# Patient Record
Sex: FEMALE | Race: WHITE | Employment: FULL TIME | URBAN - METROPOLITAN AREA
[De-identification: names, ages, dates, MRNs, and addresses within clinical notes are randomized per-mention and may not be internally consistent; named-entity substitution may affect disease eponyms.]

---

## 2017-01-24 ENCOUNTER — HOSPITAL ENCOUNTER (OUTPATIENT)
Dept: MAMMOGRAPHY | Age: 62
Discharge: HOME | End: 2017-01-24
Attending: FAMILY MEDICINE
Payer: COMMERCIAL

## 2017-01-24 DIAGNOSIS — Z12.31 VISIT FOR SCREENING MAMMOGRAM: ICD-10-CM

## 2017-01-24 PROCEDURE — 77067 SCR MAMMO BI INCL CAD: CPT

## 2017-08-15 ENCOUNTER — OFFICE VISIT (OUTPATIENT)
Dept: ORTHOPEDIC SURGERY | Age: 62
End: 2017-08-15

## 2017-08-15 VITALS
BODY MASS INDEX: 26.25 KG/M2 | RESPIRATION RATE: 19 BRPM | HEIGHT: 59 IN | SYSTOLIC BLOOD PRESSURE: 128 MMHG | DIASTOLIC BLOOD PRESSURE: 58 MMHG | TEMPERATURE: 99 F | HEART RATE: 112 BPM | WEIGHT: 130.2 LBS

## 2017-08-15 DIAGNOSIS — M75.02 ADHESIVE CAPSULITIS OF LEFT SHOULDER: Primary | ICD-10-CM

## 2017-08-15 DIAGNOSIS — M89.8X2 PAIN OF LEFT HUMERUS: ICD-10-CM

## 2017-08-15 DIAGNOSIS — M54.2 NECK PAIN: ICD-10-CM

## 2017-08-15 RX ORDER — MELOXICAM 15 MG/1
15 TABLET ORAL
Qty: 30 TAB | Refills: 1 | Status: SHIPPED | OUTPATIENT
Start: 2017-08-15 | End: 2020-09-01 | Stop reason: SDUPTHER

## 2017-08-15 RX ORDER — FLUOXETINE HYDROCHLORIDE 20 MG/1
CAPSULE ORAL
Refills: 3 | COMMUNITY
Start: 2017-07-22

## 2017-08-15 NOTE — PATIENT INSTRUCTIONS
Back Pain: Care Instructions  Your Care Instructions    Back pain has many possible causes. It is often related to problems with muscles and ligaments of the back. It may also be related to problems with the nerves, discs, or bones of the back. Moving, lifting, standing, sitting, or sleeping in an awkward way can strain the back. Sometimes you don't notice the injury until later. Arthritis is another common cause of back pain. Although it may hurt a lot, back pain usually improves on its own within several weeks. Most people recover in 12 weeks or less. Using good home treatment and being careful not to stress your back can help you feel better sooner. Follow-up care is a key part of your treatment and safety. Be sure to make and go to all appointments, and call your doctor if you are having problems. Its also a good idea to know your test results and keep a list of the medicines you take. How can you care for yourself at home? · Sit or lie in positions that are most comfortable and reduce your pain. Try one of these positions when you lie down:  ¨ Lie on your back with your knees bent and supported by large pillows. ¨ Lie on the floor with your legs on the seat of a sofa or chair. Jovanna Sevier on your side with your knees and hips bent and a pillow between your legs. ¨ Lie on your stomach if it does not make pain worse. · Do not sit up in bed, and avoid soft couches and twisted positions. Bed rest can help relieve pain at first, but it delays healing. Avoid bed rest after the first day of back pain. · Change positions every 30 minutes. If you must sit for long periods of time, take breaks from sitting. Get up and walk around, or lie in a comfortable position. · Try using a heating pad on a low or medium setting for 15 to 20 minutes every 2 or 3 hours. Try a warm shower in place of one session with the heating pad. · You can also try an ice pack for 10 to 15 minutes every 2 to 3 hours.  Put a thin cloth between the ice pack and your skin. · Take pain medicines exactly as directed. ¨ If the doctor gave you a prescription medicine for pain, take it as prescribed. ¨ If you are not taking a prescription pain medicine, ask your doctor if you can take an over-the-counter medicine. · Take short walks several times a day. You can start with 5 to 10 minutes, 3 or 4 times a day, and work up to longer walks. Walk on level surfaces and avoid hills and stairs until your back is better. · Return to work and other activities as soon as you can. Continued rest without activity is usually not good for your back. · To prevent future back pain, do exercises to stretch and strengthen your back and stomach. Learn how to use good posture, safe lifting techniques, and proper body mechanics. When should you call for help? Call your doctor now or seek immediate medical care if:  · You have new or worsening numbness in your legs. · You have new or worsening weakness in your legs. (This could make it hard to stand up.)  · You lose control of your bladder or bowels. Watch closely for changes in your health, and be sure to contact your doctor if:  · Your pain gets worse. · You are not getting better after 2 weeks. Where can you learn more? Go to http://rocio-chris.info/. Enter Z386 in the search box to learn more about \"Back Pain: Care Instructions. \"  Current as of: March 21, 2017  Content Version: 11.3  © 4747-2929 [employer]. Care instructions adapted under license by [employer] (which disclaims liability or warranty for this information). If you have questions about a medical condition or this instruction, always ask your healthcare professional. [address] any warranty or liability for your use of this information.

## 2017-08-15 NOTE — PROGRESS NOTES
Cally Cuenca  1955   Chief Complaint   Patient presents with    Shoulder Pain     Left        HISTORY OF PRESENT ILLNESS  Cally Cuenca is a 63 y.o. female who presents today for evaluation of left shoulder pain. she rates her pain 0/10 today. Pain has been present for several months. She recalls she started using a 3 lb weight to do exercises at home. Patient describes the pain as aching and sharp that is Intermittent in nature. Symptoms are worse with certain motions behind the back and is better with  Rest. Associated symptoms include stiffness. She denies pain in the shoulder blade. Since problem started, it: has worsened. Pain does wake patient up at night. Has not taken medication for the problem. Has tried following treatments: Injections:NO; Brace:NO; Therapy:NO; Cane/Crutch:NO       No Known Allergies     Past Medical History:   Diagnosis Date    ADD (attention deficit disorder) without hyperactivity     Arthritis     knees    Depression     Migraine     UTI (urinary tract infection)       Social History     Social History    Marital status:      Spouse name: N/A    Number of children: N/A    Years of education: N/A     Occupational History    Not on file.      Social History Main Topics    Smoking status: Never Smoker    Smokeless tobacco: Never Used    Alcohol use Yes      Comment: socially    Drug use: No    Sexual activity: Not on file     Other Topics Concern    Not on file     Social History Narrative      Past Surgical History:   Procedure Laterality Date    HX  SECTION  91 & 96    HX PELVIC LAPAROSCOPY      (+)endometriosis    HX TUBAL LIGATION      RI COLONOSCOPY FLX DX W/COLLJ SPEC WHEN PFRMD  2009    normal ; dr. Edgar Parra      Family History   Problem Relation Age of Onset    Hypertension Mother     Thyroid Disease Mother     Hypertension Father     Heart Attack Father     Other Father      circulation problems    Cancer Father  Colon Polyps Father       Current Outpatient Prescriptions   Medication Sig    FLUoxetine (PROZAC) 20 mg capsule TAKE 1 CAPSULE BY MOUTH ONCE DAILY    meloxicam (MOBIC) 15 mg tablet Take 1 Tab by mouth daily (with breakfast).  amphetamine-dextroamphetamine XR (ADDERALL XR) 30 mg XR capsule Take 30 mg by mouth every morning.  estradiol (VAGIFEM) 10 mcg Tab vaginal tablet Insert 10 mcg into vagina every Tuesday and Friday.  calcium carbonate-vitamin d2 500 mg(1,250mg) -200 unit Tab Take 1 Tab by mouth daily.  MULTIVITAMIN/IRON/FOLIC ACID (CENTRUM COMPLETE PO) Take  by mouth daily.  sertraline (ZOLOFT) 100 mg tablet Take  by mouth daily. No current facility-administered medications for this visit. REVIEW OF SYSTEM   Patient denies: Weight loss, Fever/Chills, HA, Visual changes, Fatigue, Chest pain, SOB, Abdominal pain, N/V/D/C, Blood in stool or urine, Edema. Pertinent positive as above in HPI. All others were negative    PHYSICAL EXAM:   Visit Vitals    /58    Pulse (!) 112    Temp 99 °F (37.2 °C) (Oral)    Resp 19    Ht 4' 11\" (1.499 m)    Wt 130 lb 3.2 oz (59.1 kg)    BMI 26.3 kg/m2     The patient is a well-developed, well-nourished female   in no acute distress. The patient is alert and oriented times three. The patient is alert and oriented times three. Mood and affect are normal.  LYMPHATIC: lymph nodes are not enlarged and are within normal limits  SKIN: normal in color and non tender to palpation. There are no bruises or abrasions noted. NEUROLOGICAL: Motor sensory exam is within normal limits. Reflexes are equal bilaterally.  There is normal sensation to pinprick and light touch  MUSCULOSKELETAL:  Examination Left shoulder   Skin Intact   AC joint tenderness -   Biceps tenderness -   Forward flexion/Elevation    Active abduction    Glenohumeral abduction 80   External rotation ROM 45   Internal rotation ROM 30   Apprehension -   Heathers Relocation -   Jerk -   Load and Shift -   Obriens -   Speeds -   Impingement sign -   Supraspinatus/Empty Can -, 5/5   External Rotation Strength -, 5/5   Lift Off/Belly Press -, 5/5   Neurovascular Intact        IMAGING: XR of the cervical spine dated 8/15/17 was reviewed and read: loss of cervical lordosis with spondylotic changes at 3-4 4-5 5-6 markedly decreased disc space    IMPRESSION:      ICD-10-CM ICD-9-CM    1. Adhesive capsulitis of left shoulder M75.02 726.0 meloxicam (MOBIC) 15 mg tablet      REFERRAL TO PHYSICAL THERAPY   2. Pain of left humerus M79.622 729.5 AMB POC XRAY; HUMERUS, 2+ VIEWS   3. Neck pain M54.2 723.1 AMB POC XRAY, SPINE, CERVICAL; 2 OR 3        PLAN:  1. I feel the patient has an adhesive capsulitis. Discussed with her that we will need to work on mobility of the shoulder. Risk factors include: none  2. No cortisone injection indicated today   3. Yes Physical/Occupational Therapy indicated today  4. No diagnostic test indicated today  5. No durable medical equipment indicated today  6. No referral indicated today   7. Yes medications indicated today: MOBIC 15 mg  8. No Narcotic indicated today. RTC 4 weeks  Follow-up Disposition: Not on File    Scribed by [place] [address]) as dictated by Wang Mckinney MD    I, Dr. Wang Mckinney, confirm that all documentation is accurate.     Wang Mckinney M.D.   [facility]

## 2017-08-24 ENCOUNTER — HOSPITAL ENCOUNTER (OUTPATIENT)
Dept: PHYSICAL THERAPY | Age: 62
Discharge: HOME | End: 2017-08-24
Payer: COMMERCIAL

## 2017-08-24 PROCEDURE — 97162 PT EVAL MOD COMPLEX 30 MIN: CPT

## 2017-08-24 PROCEDURE — 97110 THERAPEUTIC EXERCISES: CPT

## 2017-08-24 NOTE — PROGRESS NOTES
PT DAILY TREATMENT NOTE/SHOULDER EVAL 3-16    Patient Name: Marie Lozoya  Date:2017  : 1955  [x]  Patient  Verified  Payor: Liang Alexander / Plan: [place] [employer] / Product Type: HMO /    In time:8:43  Out time:9:30  Total Treatment Time (min): 52  Visit #: 1 of 12    Treatment Area: Left shoulder pain [M25.512]    SUBJECTIVE  Pain Level (0-10 scale): 5/10  []constant []intermittent [x]improving []worsening []no change since onset    Any medication changes, allergies to medications, adverse drug reactions, diagnosis change, or new procedure performed?: [x] No    [] Yes (see summary sheet for update)  Subjective functional status/changes:      Pt is a 62 yo F who presents with L shoulder pain 3-4 months ago after lifting 3# weights at home. Pain is the worst with L sidelying. Pt had a fall while camping and landed on her L shoulder right before she saw Dr. Anahi Henry. She was trying to go the bathroom after a happy hour and she lost her balance while undoing her pants. She does not worry about her balance typically. Pain has been getting better. She believes the Meloxicam may be helping.      Barriers: []pain []financial []time []transportation [x]other: none  Substance use: [x]Alcohol []Tobacco []other: socially   FABQ Score: []low [x]elevate - based on FOTO   Cognition: A & O x 4    Other:    OBJECTIVE/EXAMINATION      34 min [x]Eval                  []Re-Eval       8 min Therapeutic Exercise:  [x] See flow sheet :   Rationale: increase ROM and increase strength to improve the patients ability to improve ease of reaching for improved ease of household management     5 min Therapeutic Activity:  []  See flow sheet :   Rationale: Educated patient regarding findings of evaluation, anatomy of shoulder, stages of adhesive capsulitis, heat use 10-15 minute, new therex technique and purpose, purpose of therapy, and therapy plan in order to ensure pt understanding/compliance with therapy        With   [] TE   [] TA   [] neuro   [] other: Patient Education: [x] Review HEP    [] Progressed/Changed HEP based on:   [] positioning   [] body mechanics   [] transfers   [] heat/ice application    [] other:      Other Objective/Functional Measures:     /92 taken manually prior to therapy     Physical Therapy Evaluation - Shoulder    Posture: [] Poor    [x] Fair    [] Good    Describe: forward head and rounded shoulders     ROM:  [] Unable to assess at this time                                           AROM                                                              PROM   Left Right  Left Right   Flexion 154 180 Flexion 156 p! 182   Extension   Extension     Scaption/ 187 Scaptin/ p!     ER @ 0 Degrees   ER @ 0 Degrees     ER @ 90 Degrees 48 82 ER @ 90 Degrees 60 p! 91   IR @ 90 Degrees 64 74 IR @ 90 Degrees 72 78     Shoulder AROM Seated:  Shoulder Elevation: R 181 dgrs, L 151 dgrs   Functional ER: R T5, L T1  Functional IR: R T8, L T11    End Feel / Painful Arc:    Strength:   [] Unable to assess at this time                                                                            L (1-5) R (1-5) Pain   Flexors 3+ 4- [] Yes   [] No   Abductors 3+ 4- [] Yes   [] No   External Rotators 4 4 [] Yes   [] No   Internal Rotators 4- 4 [] Yes   [] No   Supraspinatus   [] Yes   [] No   Serratus Anterior   [] Yes   [] No   Lower Trapezius   [] Yes   [] No   Elbow Flexion 4 4 [] Yes   [] No   Elbow Extension 4 4 [] Yes   [] No       Scapulohumoral Control / Rhythm:  Decreased upward rotation of L scapula with shoulder elevation     Palpation  No TTP all RTC tendons, deltoid tuberosity, ACJ, SCJ     Optional Tests:      Spurling's  [] Pos   [x] Neg Yergason's Test [] Pos   [] Neg  Cervical Compression[] Pos   [x] Neg Nii's Sign [] Pos   [] Neg  Cervical Distraction [] Pos   [x] Neg Clunk Test  [] Pos   [] Neg  Hawkin's Test  [] Pos   [] Neg AC Joint  [] Pos   [] Neg  Speed's Test  [] Pos   [x] Neg SC Joint  [] Pos [] Neg  Empty Can  [] Pos   [x] Neg Pectoral Tightness [] Pos   [] Neg  Anterior Apprehension [] Pos   [] Neg   Posterior Apprehension [] Pos   [] Neg      Other Tests / Comments:     Pain with end-range for all motions, especially abduction and ER  Instructed to perform stretches with HEP in pain-free range  No increased pain following therapy      Pain Level (0-10 scale) post treatment: 2-3/10    ASSESSMENT/Changes in Function: See POC    Patient will continue to benefit from skilled PT services to modify and progress therapeutic interventions, address ROM deficits, address strength deficits, analyze and address soft tissue restrictions, analyze and cue movement patterns, assess and modify postural abnormalities and instruct in home and community integration to attain remaining goals.      [x]  See Plan of Care  []  See progress note/recertification  []  See Discharge Summary         Progress towards goals / Updated goals:  See POC    PLAN  [x]  Upgrade activities as tolerated     []  Continue plan of care  [x]  Update interventions per flow sheet       []  Discharge due to:_  []  Other:_      Sheri Hidalgo, PT 8/24/2017  8:40 AM

## 2017-08-24 NOTE — PROGRESS NOTES
[facility] [employer] [facility]  [address] [place] [address]  [phone] fax    Plan of Care/ Statement of Necessity for Physical Therapy Services    Patient name: Eriberto Boyd Start of Care: 2017   Referral source: [facility],* : 1955    Medical Diagnosis: Left shoulder pain [M25.512]   Onset Date:3-4 months ago   Treatment Diagnosis: L Shoulder Pain   Prior Hospitalization: see medical history Provider#: [id number]   Medications: Verified on Patient summary List    Comorbidities: arthritis, ADD, depression    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 Assessment/ key information: Pt is a 64 yo F who presents with L shoulder pain beginning 3-4 months ago after using 3# weights for an UE lifting routine. Subjective reports of pain increased with L sidelying and reaching behind her back and relieved with resting supine. Xrays were negative per pt report. Pt is not TTP over shoulder musculature or surrounding joints/bony prominences. Pt demonstrates decreased AROM with deficits evident in a capsular pattern. PROM is limited with empty end-feel. Pt demonstrates decreased strength of R shoulder. (-) Speed's, (-) Empty Can, (-) Spurling's, (-) Cervical Compression, and (-) Cervical Distraction. Findings consistent with referring dx of adhesive capsuliitis. Pt will benefit from skilled PT to address ROM, strength, flexibility, and joint mobility deficits for return to PLOF.         Evaluation Complexity History HIGH Complexity :3+ comorbidities / personal factors will impact the outcome/ POC ; Examination HIGH Complexity : 4+ Standardized tests and measures addressing body structure, function, activity limitation and / or participation in recreation  ;Presentation MEDIUM Complexity : Evolving with changing characteristics  ; Clinical Decision Making MEDIUM Complexity : FOTO score of 26-74  Overall Complexity Rating: MEDIUM  Problem List: pain affecting function, decrease ROM, decrease strength, decrease activity tolerance and decrease flexibility/ joint mobility   Treatment Plan may include any combination of the following: Therapeutic exercise, Therapeutic activities, Neuromuscular re-education, Physical agent/modality, Gait/balance training, Manual therapy, Patient education, Self Care training, Functional mobility training, Home safety training and Stair training  Patient / Family readiness to learn indicated by: asking questions, trying to perform skills and interest  Persons(s) to be included in education: patient (P) and family support person (FSP);list spouse  Barriers to Learning/Limitations: None  Patient Goal (s): Able to sleep on L side without pain  Patient Self Reported Health Status: good  Rehabilitation Potential: good    Short Term Goals: To be accomplished in 1 weeks:  1. Pt will be compliant with initial HEP to improve therapy outcomes   Long Term Goals: To be accomplished in 6 weeks:  1. Pt will improve FOTO by 7 points in order to demonstrate functional improvement   2. Pt will increase L shoulder AROM by 10 dgrs all directions in order to demonstrate improving shoulder mobility for increased ease of reaching with ADLs and household management  3. Pt will report <3/10 average pain in order to improve QOL  4. Pt will sleep for 5 hours without waking d/t pain in order to improve QOL     Frequency / Duration: Patient to be seen 2 times per week for 6 weeks.     Patient/ CarPatient/ Caregiver education and instruction: Diagnosis, prognosis, exercises   [x]  Plan of care has been reviewed with HAYLEE Dior PT 8/24/2017 8:41 AM    ________________________________________________________________________    I certify that the above Therapy Services are being furnished while the patient is under my care. I agree with the treatment plan and certify that this therapy is necessary.     Physician's Signature:____________________  Date:____________Time: _________    Please sign and return to [facility]  [address] [place] [address]  [phone] fax

## 2017-08-28 ENCOUNTER — HOSPITAL ENCOUNTER (OUTPATIENT)
Dept: PHYSICAL THERAPY | Age: 62
Discharge: HOME | End: 2017-08-28
Payer: COMMERCIAL

## 2017-08-28 PROCEDURE — 97140 MANUAL THERAPY 1/> REGIONS: CPT

## 2017-08-28 PROCEDURE — 97110 THERAPEUTIC EXERCISES: CPT

## 2017-08-28 NOTE — PROGRESS NOTES
PT DAILY TREATMENT NOTE     Patient Name: Nathaniel Chatman  Date:2017  : 1955  [x]  Patient  Verified  Payor: Joshua Going / Plan: [employer] / Product Type: HMO /    In time: 8:35  Out time:9:32  Total Treatment Time (min): 62  Visit #: 2 of 12    Treatment Area: Left shoulder pain [M25.512]    SUBJECTIVE  Pain Level (0-10 scale): 2/10  Any medication changes, allergies to medications, adverse drug reactions, diagnosis change, or new procedure performed?: [x] No    [] Yes (see summary sheet for update)  Subjective functional status/changes:   [] No changes reported  Pt reported feeling good today    OBJECTIVE    Modality rationale: decrease pain, increase tissue extensibility and increase muscle contraction/control to improve the patients ability to tolerate ADLs   Min Type Additional Details    [] Estim:  []Unatt       []IFC  []Premod                        []Other:  []w/ice   []w/heat  Position:  Location:    [] Estim: []Att    []TENS instruct  []NMES                    []Other:  []w/US   []w/ice   []w/heat  Position:  Location:    []  Traction: [] Cervical       []Lumbar                       [] Prone          []Supine                       []Intermittent   []Continuous Lbs:  [] before manual  [] after manual    []  Ultrasound: []Continuous   [] Pulsed                           []1MHz   []3MHz W/cm2:  Location:    []  Iontophoresis with dexamethasone         Location: [] Take home patch   [] In clinic   10 []  Ice     [x]  heat  []  Ice massage  []  Laser   []  Anodyne Position: seated   Location: L shoulder    []  Laser with stim  []  Other:  Position:  Location:    []  Vasopneumatic Device Pressure:       [] lo [] med [] hi   Temperature: [] lo [] med [] hi   [x] Skin assessment post-treatment:  [x]intact []redness- no adverse reaction    []redness - adverse reaction:     38 min Therapeutic Exercise:  [x] See flow sheet :   Rationale: increase ROM and increase strength to improve the patients ability to perform ADLs with ease. 9 min Manual Therapy:  L shoulder inf, post grade 2-3 mob; L scapula upward mob with L shoulder elevation; gentle traction; PROM; ER stretching   Rationale: decrease pain and increase ROM to improve pt's L shoulder ROM for ADLs            With   [] TE   [] TA   [] neuro   [] other: Patient Education: [x] Review HEP    [] Progressed/Changed HEP based on:   [] positioning   [] body mechanics   [] transfers   [] heat/ice application    [] other:      Other Objective/Functional Measures:    BP: 122/79 mmHg; HR: 82 bpm   Min increased pain with Jamila   More limited with abd than flex     Performed Jamila appropriately after instructions   Mod challenged with pendulum    Tolerated manual fairly with min c/o pain at end range of ER & abd    Pain Level (0-10 scale) post treatment: 0/10    ASSESSMENT/Changes in Function: initiated treatment as POC, pt demonstrated good effort with all Jamila. Patient will continue to benefit from skilled PT services to modify and progress therapeutic interventions, address functional mobility deficits, address ROM deficits, address strength deficits, analyze and address soft tissue restrictions, analyze and cue movement patterns, analyze and modify body mechanics/ergonomics, assess and modify postural abnormalities and instruct in home and community integration to attain remaining goals. [x]  See Plan of Care  []  See progress note/recertification  []  See Discharge Summary         Progress towards goals / Updated goals:  Short Term Goals: To be accomplished in 1 weeks:   1. Pt will be compliant with initial HEP to improve therapy outcomes partially due to family issue 8-  Long Term Goals: To be accomplished in 6 weeks:   1. Pt will improve FOTO by 7 points in order to demonstrate functional improvement    2.  Pt will increase L shoulder AROM by 10 dgrs all directions in order to demonstrate improving shoulder mobility for increased ease of reaching with ADLs and household management   3. Pt will report <3/10 average pain in order to improve QOL   4.  Pt will sleep for 5 hours without waking d/t pain in order to improve QOL     PLAN  []  Upgrade activities as tolerated     [x]  Continue plan of care  []  Update interventions per flow sheet       []  Discharge due to:_  []  Other:_      Janki Gamallucas, PT 8/28/2017  8:42 AM    Future Appointments  Date Time Provider [facility]   9/1/2017 4:30 PM Jaynie Canavan, PTA [facility]   9/11/2017 4:00 PM Jade Cohen MD [facility] [place] [address]

## 2017-09-01 ENCOUNTER — HOSPITAL ENCOUNTER (OUTPATIENT)
Dept: PHYSICAL THERAPY | Age: 62
Discharge: HOME | End: 2017-09-01
Payer: COMMERCIAL

## 2017-09-01 PROCEDURE — 97110 THERAPEUTIC EXERCISES: CPT

## 2017-09-01 NOTE — PROGRESS NOTES
PT DAILY TREATMENT NOTE 12    Patient Name: Inocencia Edwards  Date:2017  : 1955  [x]  Patient  Verified  Payor: Aguilar Mulling / Plan: [place] [employer]  CAPITATED PT / Product Type: Commerical /    In time: 4:35  Out time: 5:16  Total Treatment Time (min): 41  Visit #: 3 of 12    Treatment Area: Left shoulder pain [M25.512]    SUBJECTIVE  Pain Level (0-10 scale): 0-3/10 depends on movements  Any medication changes, allergies to medications, adverse drug reactions, diagnosis change, or new procedure performed?: [x] No    [] Yes (see summary sheet for update)  Subjective functional status/changes:   [] No changes reported  Pt having pain depends on activity    OBJECTIVE  Modality rationale: decrease pain, increase tissue extensibility and increase muscle contraction/control to improve the patients ability to tolerate ADLs   Min Type Additional Details     [] Estim:  []Unatt       []IFC  []Premod                        []Other:  []w/ice   []w/heat  Position:  Location:     [] Estim: []Att    []TENS instruct  []NMES                    []Other:  []w/US   []w/ice   []w/heat  Position:  Location:     []  Traction: [] Cervical       []Lumbar                       [] Prone          []Supine                       []Intermittent   []Continuous Lbs:  [] before manual  [] after manual     []  Ultrasound: []Continuous   [] Pulsed                           []1MHz   []3MHz W/cm2:  Location:     []  Iontophoresis with dexamethasone         Location: [] Take home patch   [] In clinic   10 []  Ice     [x]  heat  []  Ice massage  []  Laser   []  Anodyne Position: seated   Location: L shoulder     []  Laser with stim  []  Other:  Position:  Location:     []  Vasopneumatic Device Pressure:       [] lo [] med [] hi   Temperature: [] lo [] med [] hi   [x] Skin assessment post-treatment:  [x]intact []redness- no adverse reaction    []redness - adverse reaction:      31 min Therapeutic Exercise:  [x] See flow sheet :   Rationale: increase ROM and increase strength to improve the patients ability to perform ADLs with ease. With   [] TE   [] TA   [] neuro   [] other: Patient Education: [x] Review HEP    [] Progressed/Changed HEP based on:   [] positioning   [] body mechanics   [] transfers   [] heat/ice application    [] other:      Other Objective/Functional Measures: Mod pain with all Jamila; pain locates ant of L GH joint    Mod challenged with TB row with min shoulder hiking   L shoulder flex AROM in standin degrees      Pain Level (0-10 scale) post treatment: 0/10    ASSESSMENT/Changes in Function: Pt progress steady towards goals. Will progress with parascapular musculature strengthening Jamila.     Patient will continue to benefit from skilled PT services to modify and progress therapeutic interventions, address functional mobility deficits, address ROM deficits, address strength deficits, analyze and address soft tissue restrictions, analyze and cue movement patterns, analyze and modify body mechanics/ergonomics, assess and modify postural abnormalities and instruct in home and community integration to attain remaining goals.      []  See Plan of Care  []  See progress note/recertification  [x]  See Discharge Summary      Progress towards goals / Updated goals:  Short Term Goals: To be accomplished in 1 weeks:                        1. Pt will be compliant with initial HEP to improve therapy outcomes partially due to family issue 2017  Long Term Goals: To be accomplished in 6 weeks:                        1. Pt will improve FOTO by 7 points in order to demonstrate functional improvement                         2. Pt will increase L shoulder AROM by 10 dgrs all directions in order to demonstrate improving shoulder mobility for increased ease of reaching with ADLs and household management                        3. Pt will report <3/10 average pain in order to improve QOL                        4. Pt will sleep for 5 hours without waking d/t pain in order to improve QOL      PLAN  []  Upgrade activities as tolerated     [x]  Continue plan of care  []  Update interventions per flow sheet       []  Discharge due to:_  []  Other:_      Yue Marie, PT 9/1/2017  11:21 AM    Future Appointments  Date Time Provider Leanne Martin   9/1/2017 4:30 PM Thienphuc Sigmund Pedlar [facility]   9/5/2017 5:00 PM Thienphuc Sigmund Pedlar [facility]   9/8/2017 5:00 PM August Pollard PTA [facility]   9/11/2017 4:00 PM Minesh Jamison MD [place] [id number]   9/12/2017 8:30 AM Thienphjean-pierre Monaeund Pedlar VIKYCIO [facility]   9/14/2017 5:00 PM August Pollard PTA [facility]

## 2017-09-05 ENCOUNTER — HOSPITAL ENCOUNTER (OUTPATIENT)
Dept: PHYSICAL THERAPY | Age: 62
Discharge: HOME | End: 2017-09-05
Payer: COMMERCIAL

## 2017-09-05 PROCEDURE — 97110 THERAPEUTIC EXERCISES: CPT

## 2017-09-05 NOTE — PROGRESS NOTES
PT DAILY TREATMENT NOTE     Patient Name: Ron Gauthier  Date:2017  : 1955  [x]  Patient  Verified  Payor: Sydni Fuchs / [place] [place] [employer]  [facility] / Product Type: Commerical /    In time: 5:14  Out time: 5:44  Total Treatment Time (min): 30  Visit #: 4 of 12    Treatment Area: Left shoulder pain [M25.512]    SUBJECTIVE  Pain Level (0-10 scale): 0/10  Any medication changes, allergies to medications, adverse drug reactions, diagnosis change, or new procedure performed?: [x] No    [] Yes (see summary sheet for update)  Subjective functional status/changes:   [] No changes reported  Pt reported no pain but she's not sure if moving with increase it    OBJECTIVE        Modality rationale: decrease pain, increase tissue extensibility and increase muscle contraction/control to improve the patients ability to tolerate ADLs   Min Type Additional Details      [] Estim:  []Unatt       []IFC  []Premod                        []Other:  []w/ice   []w/heat  Position:  Location:      [] Estim: []Att    []TENS instruct  []NMES                    []Other:  []w/US   []w/ice   []w/heat  Position:  Location:      []  Traction: [] Cervical       []Lumbar                       [] Prone          []Supine                       []Intermittent   []Continuous Lbs:  [] before manual  [] after manual      []  Ultrasound: []Continuous   [] Pulsed                           []1MHz   []3MHz W/cm2:  Location:      []  Iontophoresis with dexamethasone         Location: [] Take home patch   [] In clinic   10 []  Ice     [x]  heat  []  Ice massage  []  Laser   []  Anodyne Position: seated   Location: L shoulder      []  Laser with stim  []  Other:  Position:  Location:      []  Vasopneumatic Device Pressure:       [] lo [] med [] hi   Temperature: [] lo [] med [] hi   [x] Skin assessment post-treatment:  [x]intact []redness- no adverse reaction    []redness - adverse reaction:       20 min Therapeutic Exercise:  [x] See flow sheet : Rationale: increase ROM and increase strength to improve the patients ability to perform ADLs with ease.             With   [] TE   [] TA   [] neuro   [] other: Patient Education: [x] Review HEP    [] Progressed/Changed HEP based on:   [] positioning   [] body mechanics   [] transfers   [] heat/ice application    [] other:      Other Objective/Functional Measures:     Pt was 14 min late    Performed Jamila appropriately with min verbal cues   Added LT/MT TE, will progress with more scap stabilization Jamila     Pain Level (0-10 scale) post treatment: 0/10    ASSESSMENT/Changes in Function: pt progressing steady towards goals; demonstrated mod pain relief and she was able to sleep for 5 hours without being wakened up by pain. Will increase rep and resistance with all Jamila next visit. Patient will continue to benefit from skilled PT services to modify and progress therapeutic interventions, address functional mobility deficits, address ROM deficits, address strength deficits, analyze and address soft tissue restrictions, analyze and cue movement patterns, analyze and modify body mechanics/ergonomics, assess and modify postural abnormalities and instruct in home and community integration to attain remaining goals.      []  See Plan of Care  []  See progress note/recertification  [x]  See Discharge Summary      Progress towards goals / Updated goals:  Short Term Goals: To be accomplished in 1 weeks:                        5. Pt will be compliant with initial HEP to improve therapy outcomes partially due to family issue 8-  Long Term Goals: To be accomplished in 6 weeks:                        8.  Pt will improve FOTO by 7 points in order to demonstrate functional improvement                         2. Pt will increase L shoulder AROM by 10 dgrs all directions in order to demonstrate improving shoulder mobility for increased ease of reaching with ADLs and household management progressing, flex of L shoulder is about 10 degrees different from R shoulder 9-5-17                        3. Pt will report <3/10 average pain in order to improve QOL. Progressing, 0-3/10 for the last 2 consecutive visits 9-5-17                        4. Pt will sleep for 5 hours without waking d/t pain in order to improve QOL.  Met per pt report 9-5-17      PLAN  []  Upgrade activities as tolerated     [x]  Continue plan of care  []  Update interventions per flow sheet       []  Discharge due to:_  []  Other:_      Uyen Gardner, PT 9/5/2017  8:05 AM    Future Appointments  Date Time Provider Leanne Martin   9/5/2017 5:00 PM Seth Davalos [facility]   9/8/2017 5:00 PM Alea Otto PTA [facility]   9/11/2017 4:00 PM MD [place] [address]   9/12/2017 8:30 AM Thienphuc Jock Lied OJAUSAJAKUB [facility]   9/14/2017 5:00 PM Alea Otto PTA [facility]

## 2017-09-08 ENCOUNTER — HOSPITAL ENCOUNTER (OUTPATIENT)
Dept: PHYSICAL THERAPY | Age: 62
Discharge: HOME | End: 2017-09-08
Payer: COMMERCIAL

## 2017-09-08 PROCEDURE — 97110 THERAPEUTIC EXERCISES: CPT

## 2017-09-08 PROCEDURE — 97140 MANUAL THERAPY 1/> REGIONS: CPT

## 2017-09-08 NOTE — PROGRESS NOTES
PT DAILY TREATMENT NOTE     Patient Name: Eriberto Boyd  Date:2017  : 1955  [x]  Patient  Verified  Payor: Alpa Parsons / Plan: [place] [employer]  CAPITATED PT / Product Type: Commerical /    In time: 5:15  Out time: 6:00  Total Treatment Time (min): 45  Visit #: 5 of 12    Treatment Area: Left shoulder pain [M25.512]    SUBJECTIVE  Pain Level (0-10 scale): 3/10  Any medication changes, allergies to medications, adverse drug reactions, diagnosis change, or new procedure performed?: [x] No    [] Yes (see summary sheet for update)  Subjective functional status/changes:   [] No changes reported  Pt reports mainly discomfort when reaching behind her back to put on jackets and clothes. She feels she has been getting better with stretching. She cancelled her MD appt because she hadn't done that much therapy yet and scheduled it a little further out.      OBJECTIVE    Modality rationale: decrease pain and increase tissue extensibility to improve the patients ability to improve ease of returning to gym routine   Min Type Additional Details    [] Estim:  []Unatt       []IFC  []Premod                        []Other:  []w/ice   []w/heat  Position:  Location:    [] Estim: []Att    []TENS instruct  []NMES                    []Other:  []w/US   []w/ice   []w/heat  Position:  Location:    []  Traction: [] Cervical       []Lumbar                       [] Prone          []Supine                       []Intermittent   []Continuous Lbs:  [] before manual  [] after manual    []  Ultrasound: []Continuous   [] Pulsed                           []1MHz   []3MHz W/cm2:  Location:    []  Iontophoresis with dexamethasone         Location: [] Take home patch   [] In clinic   10 []  Ice     [x]  heat  []  Ice massage  []  Laser   []  Anodyne Position:seated  Location: L shoulder    []  Laser with stim  []  Other:  Position:  Location:    []  Vasopneumatic Device Pressure:       [] lo [] med [] hi   Temperature: [] lo [] med [] hi   [x] Skin assessment post-treatment:  [x]intact []redness- no adverse reaction    []redness - adverse reaction:     25 min Therapeutic Exercise:  [x] See flow sheet :   Rationale: increase ROM and increase strength to improve the ability of OH reaching to return to performing ADLs    10 min Manual Therapy:  [facility] posterior/inferior glides, AC joint mobs   Rationale: decrease pain, increase ROM and increase tissue extensibility to improve ease of OH reaching         With   [] TE   [] TA   [] neuro   [] other: Patient Education: [x] Review HEP    [] Progressed/Changed HEP based on:   [] positioning   [] body mechanics   [] transfers   [] heat/ice application    [] other:      Other Objective/Functional Measures:   Improved behind the back reaching post manual with decreased pain  Slight pain with ER stretching  No TP in subscapularis or infraspinatus  Cues to prevent UT hiking with exercises    Pain Level (0-10 scale) post treatment: 1/10    ASSESSMENT/Changes in Function: Pt making steady progress towards goals with improving mobility. Pt has most difficulty with behind the back reaching for donning clothes or jackets but improves with manual. Will continue working on posture and [facility] mobility for decreased pain with ADLs. Patient will continue to benefit from skilled PT services to modify and progress therapeutic interventions, address functional mobility deficits, address ROM deficits, address strength deficits, analyze and address soft tissue restrictions, analyze and cue movement patterns, assess and modify postural abnormalities and instruct in home and community integration to attain remaining goals. Progress towards goals / Updated goals:  Short Term Goals: To be accomplished in 1 weeks:  1. Pt will be compliant with initial HEP to improve therapy outcomes partially due to family issue 8-  Long Term Goals: To be accomplished in 6 weeks:  1.  Pt will improve FOTO by 7 points in order to demonstrate functional improvement   2. Pt will increase L shoulder AROM by 10 dgrs all directions in order to demonstrate improving shoulder mobility for increased ease of reaching with ADLs and household management progressing, flex of L shoulder is about 10 degrees different from R shoulder 9-5-17  3. Pt will report <3/10 average pain in order to improve QOL. Progressing, 0-3/10 for the last 2 consecutive visits 9-5-17  4. Pt will sleep for 5 hours without waking d/t pain in order to improve QOL.  Met per pt report 9-5-17    PLAN  [x]  Upgrade activities as tolerated     [x]  Continue plan of care  []  Update interventions per flow sheet       []  Discharge due to:_  []  Other:_      Jaynie Canavan, PTA 9/8/2017  2:30 PM    Future Appointments  Date Time Provider Leanne Martin   9/8/2017 5:00 PM Jaynie Canavan, PTA [facility]   9/11/2017 4:00 PM Jade Cohen MD [place] [id number]   9/12/2017 8:30 AM Seth Fraser [facility]BTRAO [facility]   9/14/2017 5:00 PM Jaynie Canavan, PTA [facility]

## 2017-09-12 ENCOUNTER — HOSPITAL ENCOUNTER (OUTPATIENT)
Dept: PHYSICAL THERAPY | Age: 62
Discharge: HOME | End: 2017-09-12
Payer: COMMERCIAL

## 2017-09-12 PROCEDURE — 97110 THERAPEUTIC EXERCISES: CPT

## 2017-09-12 NOTE — PROGRESS NOTES
PT DAILY TREATMENT NOTE     Patient Name: Rudy Hodge  Date:2017  : 1955  [x]  Patient  Verified  Payor: Pilar Hartley / Plan: [address] [place] [address] Rd PT / Product Type: Commerical /    In time:835  Out time:914  Total Treatment Time (min): 39  Visit #: 6 of 12    Treatment Area: Left shoulder pain [M25.512]    SUBJECTIVE  Pain Level (0-10 scale): 3/10  Any medication changes, allergies to medications, adverse drug reactions, diagnosis change, or new procedure performed?: [x] No    [] Yes (see summary sheet for update)  Subjective functional status/changes:   [] No changes reported  Pt stated that she has a little pain in her shoulder today    OBJECTIVE    Modality rationale: decrease pain and increase tissue extensibility to improve the patients ability to increase ease with ADLs   Min Type Additional Details    [] Estim:  []Unatt       []IFC  []Premod                        []Other:  []w/ice   []w/heat  Position:  Location:    [] Estim: []Att    []TENS instruct  []NMES                    []Other:  []w/US   []w/ice   []w/heat  Position:  Location:    []  Traction: [] Cervical       []Lumbar                       [] Prone          []Supine                       []Intermittent   []Continuous Lbs:  [] before manual  [] after manual    []  Ultrasound: []Continuous   [] Pulsed                           []1MHz   []3MHz W/cm2:  Location:    []  Iontophoresis with dexamethasone         Location: [] Take home patch   [] In clinic   10 []  Ice     [x]  heat  []  Ice massage  []  Laser   []  Anodyne Position:seated  Location:L sh    []  Laser with stim  []  Other:  Position:  Location:    []  Vasopneumatic Device Pressure:       [] lo [] med [] hi   Temperature: [] lo [] med [] hi   [x] Skin assessment post-treatment:  [x]intact []redness- no adverse reaction    []redness - adverse reaction:     29 min Therapeutic Exercise:  [x] See flow sheet :   Rationale: increase ROM and increase strength to improve the patients ability to increase ease with ADLs    With   [x] TE   [] TA   [] neuro   [] other: Patient Education: [x] Review HEP    [] Progressed/Changed HEP based on:   [] positioning   [] body mechanics   [] transfers   [] heat/ice application    [] other:      Other Objective/Functional Measures:   Pt had no complaint of increased pain with exercises  Tolerated stretches well  Did report that she has more discomfort in the morning and will probably stick with evening appts     Pain Level (0-10 scale) post treatment: 0/10    ASSESSMENT/Changes in Function:   Pt is slowly progressing toward goals. Pt cont to have increased pain and difficulty with reaching activities. Pt reported that she went grocery shopping over the weekend and carrying the bags made her shoulder hurt some. Pt had fairly good range for flexion and scaption with the pulleys and wall ladder. Patient will continue to benefit from skilled PT services to modify and progress therapeutic interventions, address functional mobility deficits, address ROM deficits and address strength deficits to attain remaining goals. [x]  See Plan of Care  []  See progress note/recertification  []  See Discharge Summary         Progress towards goals / Updated goals:  Short Term Goals: To be accomplished in 1 weeks:  1. Pt will be compliant with initial HEP to improve therapy outcomes   partially due to family issue 8-  Long Term Goals: To be accomplished in 6 weeks:  1. Pt will improve FOTO by 7 points in order to demonstrate functional improvement   2. Pt will increase L shoulder AROM by 10 dgrs all directions in order to demonstrate improving shoulder mobility for increased ease of reaching with ADLs and household management   progressing, flex of L shoulder is about 10 degrees different from R shoulder 9-5-17  3. Pt will report <3/10 average pain in order to improve QOL. Progressing, 0-3/10 for the last 2 consecutive visits 9-5-17  4.  Pt will sleep for 5 hours without waking d/t pain in order to improve QOL.    Met per pt report 9-5-17    PLAN  []  Upgrade activities as tolerated     [x]  Continue plan of care  []  Update interventions per flow sheet       []  Discharge due to:_  []  Other:_      Ernestina Ruffin PTA 9/12/2017  8:35 AM    Future Appointments  Date Time Provider Leanne Veronica   9/14/2017 5:00 PM Gasper Farrell PTA [facility]   10/9/2017 4:30 PM Gm Castrejon MD [facility] [place] [address]

## 2017-09-14 ENCOUNTER — APPOINTMENT (OUTPATIENT)
Dept: PHYSICAL THERAPY | Age: 62
End: 2017-09-14
Payer: COMMERCIAL

## 2017-09-18 ENCOUNTER — HOSPITAL ENCOUNTER (OUTPATIENT)
Dept: PHYSICAL THERAPY | Age: 62
Discharge: HOME | End: 2017-09-18
Payer: COMMERCIAL

## 2017-09-18 PROCEDURE — 97140 MANUAL THERAPY 1/> REGIONS: CPT

## 2017-09-18 PROCEDURE — 97110 THERAPEUTIC EXERCISES: CPT

## 2017-09-18 NOTE — PROGRESS NOTES
PT DAILY TREATMENT NOTE     Patient Name: Anand Thompson  Date:2017  : 1955  [x]  Patient  Verified  Payor: Ifeanyi Bustamante / Plan: [place] [employer]  [facility] / Product Type: Commerical /    In time: 5:29  Out time: 6:14  Total Treatment Time (min): 45  Visit #: 7 of 12    Treatment Area: Left shoulder pain [M25.512]    SUBJECTIVE  Pain Level (0-10 scale): 0/10  Any medication changes, allergies to medications, adverse drug reactions, diagnosis change, or new procedure performed?: [x] No    [] Yes (see summary sheet for update)  Subjective functional status/changes:   [] No changes reported  Pt reported about more than 50% improvement after [place]    OBJECTIVE  Modality rationale: decrease pain and increase tissue extensibility to improve the patients ability to increase ease with ADLs   Min Type Additional Details     [] Estim:  []Unatt       []IFC  []Premod                        []Other:  []w/ice   []w/heat  Position:  Location:     [] Estim: []Att    []TENS instruct  []NMES                    []Other:  []w/US   []w/ice   []w/heat  Position:  Location:     []  Traction: [] Cervical       []Lumbar                       [] Prone          []Supine                       []Intermittent   []Continuous Lbs:  [] before manual  [] after manual     []  Ultrasound: []Continuous   [] Pulsed                           []1MHz   []3MHz W/cm2:  Location:     []  Iontophoresis with dexamethasone         Location: [] Take home patch   [] In clinic   10 []  Ice     [x]  heat  []  Ice massage  []  Laser   []  Anodyne Position:seated  Location:L sh     []  Laser with stim  []  Other:  Position:  Location:     []  Vasopneumatic Device Pressure:       [] lo [] med [] hi   Temperature: [] lo [] med [] hi   [x] Skin assessment post-treatment:  [x]intact []redness- no adverse reaction    []redness - adverse reaction:      26 min Therapeutic Exercise:  [x] See flow sheet :   Rationale: increase ROM and increase strength to improve the patients ability to increase ease with ADLs    9 min Manual Therapy:  L GH joint grade 3-4 post/inf mob, traction, ER stretch and L AC grade 3-4 mob   Rationale: decrease pain and increase ROM to improve L shoulder AROM for ADLs performance. With   [] TE   [] TA   [] neuro   [] other: Patient Education: [x] Review HEP    [] Progressed/Changed HEP based on:   [] positioning   [] body mechanics   [] transfers   [] heat/ice application    [] other:      Other Objective/Functional Measures:    Improved scap ROM with pulley and wall wipe   Min tightness and decreased ant mobility of L shoulder, mod pain with ER end range   Equal functional AROM comparing B shoulders with flex/abd, ER, and IR   Pt reported min-mod pain 2-3 times at base of her skull on R side during last month after she woke up    Suggested pt to contact MD to update on her pain med dosage since her pain improved well and pt expressed concern if she had to take anti-inflammatory med everyday     Pain Level (0-10 scale) post treatment: 0/10    ASSESSMENT/Changes in Function: pt progressing well with PT, demonstrated good improvement with functional mobility, pain relief and AROM of L shoulder. Pt's main limitations deemed to be decreased strength/endurance of parascapular musculature, decreased mobility L GH, decreased tolerance to prolonged activities with increased pain. Would cont to benefit from skilled PT to address these limitations and work towards unmet goals. Patient will continue to benefit from skilled PT services to modify and progress therapeutic interventions, address functional mobility deficits, address ROM deficits and address strength deficits to attain remaining goals.      []  See Plan of Care  [x]  See progress note/recertification  []  See Discharge Summary      Progress towards goals / Updated goals:  Short Term Goals: To be accomplished in 1 weeks:   1.  Pt will be compliant with initial HEP to improve therapy outcomes partially due to family issue 8-  Long Term Goals: To be accomplished in 6 weeks:   1. Pt will improve FOTO by 7 points in order to demonstrate functional improvement. Progressing well, improve 5 points 9-   2. Pt will increase L shoulder AROM by 10 dgrs all directions in order to demonstrate improving shoulder mobility for increased ease of reaching with ADLs and household management   progressing, flex of L shoulder is about 10 degrees different from R shoulder 9-5-17   3. Pt will report <3/10 average pain in order to improve QOL. Progressing, 0-3/10 for the last 2 consecutive visits 9-5-17    4. Pt will sleep for 5 hours without waking d/t pain in order to improve QOL.    Met per pt report 9-5-17     PLAN  []  Upgrade activities as tolerated     [x]  Continue plan of care  []  Update interventions per flow sheet       []  Discharge due to:_  []  Other:_      Lindsey Agent, PT 9/18/2017  7:55 AM    Future Appointments  Date Time Provider Leanne Martin   9/18/2017 5:30 PM Seth Payan [facility] [address]   9/20/2017 5:30 PM [facility] [address]   9/22/2017 5:00 PM Smita Proctor PTA [facility] [address]   10/9/2017 4:30 PM Eric Lowe MD [facility] 69

## 2017-09-18 NOTE — PROGRESS NOTES
[facility] [employer] [facility]  [address] [place] [address]  [phone] fax    Physical Therapy Progress Note  Patient name: Howard Garcia Start of Care: 2017   Referral source: Jennifer Montenegro,* : 1955                          Medical Diagnosis: Left shoulder pain [M25.512] Onset Date:3-4 months ago   Treatment Diagnosis: L Shoulder Pain   Prior Hospitalization: see medical history Provider#: [id number]   Medications: Verified on Patient summary List    Comorbidities: arthritis, ADD, depression    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[address], lives with , R handed, singing in praise band    Visits from [place] of Care: 7    Missed Visits: 0    Established Goals:         Excellent           Good         Limited           None  [x] Increased ROM   []  [x]  []  []  [] Increased Strength  []  []  []  []  [x] Increased Mobility  []  [x]  []  []   [x] Decreased Pain   []  [x]  []  []  [] Decreased Swelling  []  []  []  []    Key Functional Changes: improved functional AROM of L shoulder, improved pain level, improved functional mobility performance with increased FOTO score    Updated Goals: to be achieved in 4 weeks:  Long Term Goals: To be accomplished in 6 weeks:   1. Pt will improve FOTO by 7 points in order to demonstrate functional improvement. 2. Pt will report </= 1/10 average pain in order to improve QOL. 3. Pt will demonstrated full and pain free AROM with L shoulder compared to R shoulder so she can perform ADLs comfortably. 4. Pt will have at least 4/5 strength with MT/LT MMT to maintain good posture and improve endurance for ADLs performance. ASSESSMENT/RECOMMENDATIONS: pt progressing well with PT, demonstrated good improvement with functional mobility (~ equal to R shoulder with flex/abd, ER and IR), pain relief and AROM of L shoulder.  Pt's main limitations deemed to be decreased strength/endurance of parascapular musculature, decreased mobility L GH, decreased tolerance to prolonged activities with increased pain. Would cont to benefit from skilled PT to address these limitations and work towards unmet goals. []Continue therapy per initial plan/protocol at a frequency of  2 x per week for 3-4 weeks  []Continue therapy with the following recommended changes:_____________________      _____________________________________________________________________  []Discontinue therapy progressing towards or have reached established goals  []Discontinue therapy due to lack of appreciable progress towards goals  []Discontinue therapy due to lack of attendance or compliance  []Await Physician's recommendations/decisions regarding therapy  []Other:________________________________________________________________    Thank you for this referral.   Sebastian Nix, PT 9/18/2017 6:17 PM    NOTE TO PHYSICIAN:  [address]   FAX TO [facility]: ([phone]  If you are unable to process this request in 24 hours please contact our office: [phone] I have read the above report and request that my patient continue as recommended. ? I have read the above report and request that my patient continue therapy with the following changes/special instructions:____________________________________  ? I have read the above report and request that my patient be discharged from therapy.     Physicians signature: ______________________________Date: ______Time:______

## 2017-09-20 ENCOUNTER — HOSPITAL ENCOUNTER (OUTPATIENT)
Dept: PHYSICAL THERAPY | Age: 62
Discharge: HOME | End: 2017-09-20
Payer: COMMERCIAL

## 2017-09-20 PROCEDURE — 97110 THERAPEUTIC EXERCISES: CPT

## 2017-09-20 NOTE — PROGRESS NOTES
PT DAILY TREATMENT NOTE     Patient Name: Cally Cuenca  Date:2017  : 1955  [x]  Patient  Verified  Payor: Barbee Mcardle / Plan: [place] [employer]  [facility] PT / Product Type: Commerical /    In time: 5:31  Out time: 6:15  Total Treatment Time (min): 44  Visit #: 8 of 12    Treatment Area: Left shoulder pain [M25.512]    SUBJECTIVE  Pain Level (0-10 scale): 1-2/10  Any medication changes, allergies to medications, adverse drug reactions, diagnosis change, or new procedure performed?: [x] No    [] Yes (see summary sheet for update)  Subjective functional status/changes:   [] No changes reported  Pt reports not too much pain. She states most motions are back except a little bit of behind the back reaching. She states the pain has decreased as well.      OBJECTIVE    Modality rationale: decrease pain and increase tissue extensibility to improve the patients ability to perform ADLs   Min Type Additional Details    [] Estim:  []Unatt       []IFC  []Premod                        []Other:  []w/ice   []w/heat  Position:  Location:    [] Estim: []Att    []TENS instruct  []NMES                    []Other:  []w/US   []w/ice   []w/heat  Position:  Location:    []  Traction: [] Cervical       []Lumbar                       [] Prone          []Supine                       []Intermittent   []Continuous Lbs:  [] before manual  [] after manual    []  Ultrasound: []Continuous   [] Pulsed                           []1MHz   []3MHz W/cm2:  Location:    []  Iontophoresis with dexamethasone         Location: [] Take home patch   [] In clinic   10 []  Ice     [x]  heat  []  Ice massage  []  Laser   []  Anodyne Position:supine  Location: L shooulder    []  Laser with stim  []  Other:  Position:  Location:    []  Vasopneumatic Device Pressure:       [] lo [] med [] hi   Temperature: [] lo [] med [] hi   [x] Skin assessment post-treatment:  [x]intact []redness- no adverse reaction    []redness - adverse reaction:     29 min Therapeutic Exercise:  [x] See flow sheet :   Rationale: increase ROM and increase strength to improve the patients ability to perform behind the back reaching for dressing    5 min Manual Therapy:  [address] posterior/inferior mobs grade II-III   Rationale: decrease pain, increase ROM and increase tissue extensibility to perform behind the back reaching          With   [] TE   [] TA   [] neuro   [] other: Patient Education: [x] Review HEP    [] Progressed/Changed HEP based on:   [] positioning   [] body mechanics   [] transfers   [] heat/ice application    [] other:      Other Objective/Functional Measures:   No increased pain with exercises  D/C pulleys, ladder, wall wipes due to full range  Progress to gentle shoulder stability exercises  Slight limitation with functional IR but overall much improved  No TPs and good capsular mobility     Pain Level (0-10 scale) post treatment: 0/10    ASSESSMENT/Changes in Function: Pt making excellent progress towards goals with improvement in pain and mobility. She is mainly limited with functional IR at this time. Pt will benefit from a few more sessions to develop a final HEP for gentle strengthening for stability to return to gym activities. Patient will continue to benefit from skilled PT services to modify and progress therapeutic interventions, address functional mobility deficits, address ROM deficits, address strength deficits, analyze and address soft tissue restrictions, analyze and cue movement patterns, analyze and modify body mechanics/ergonomics, assess and modify postural abnormalities, address imbalance/dizziness and instruct in home and community integration to attain remaining goals. Updated Goals: to be achieved in 4 weeks:  Long Term Goals: To be accomplished in 6 weeks:  1. Pt will improve FOTO by 7 points in order to demonstrate functional improvement.   2. Pt will report </= 1/10 average pain in order to improve QOL.   3. Pt will demonstrated full and pain free AROM with L shoulder compared to R shoulder so she can perform ADLs comfortably. 4. Pt will have at least 4/5 strength with MT/LT MMT to maintain good posture and improve endurance for ADLs performance.      PLAN  [x]  Upgrade activities as tolerated     [x]  Continue plan of care  []  Update interventions per flow sheet       []  Discharge due to:_  []  Other:_      Jagdeep Lee PTA 9/20/2017  6:22 PM    Future Appointments  Date Time Provider Leanne Martin   9/22/2017 5:00 PM Jagdeep Lee PTA [facility]   10/6/2017 5:00 PM HAYLEE [facility]   10/9/2017 4:30 PM Joe Navarro MD [facility]

## 2017-09-22 ENCOUNTER — HOSPITAL ENCOUNTER (OUTPATIENT)
Dept: PHYSICAL THERAPY | Age: 62
Discharge: HOME | End: 2017-09-22
Payer: COMMERCIAL

## 2017-09-22 ENCOUNTER — APPOINTMENT (OUTPATIENT)
Dept: PHYSICAL THERAPY | Age: 62
End: 2017-09-22
Payer: COMMERCIAL

## 2017-09-22 PROCEDURE — 97110 THERAPEUTIC EXERCISES: CPT

## 2017-09-22 NOTE — PROGRESS NOTES
PT DAILY TREATMENT NOTE     Patient Name: Viki Hill  Date:2017  : 1955  [x]  Patient  Verified  Payor: Judith Red / [place]: [place] [employer]  [facility] / Product Type: Commerical /    In time:5:00  Out time:5:45  Total Treatment Time (min): 45  Visit #: 9 of 12    Treatment Area: Left shoulder pain [M25.512]    SUBJECTIVE  Pain Level (0-10 scale): 2/10  Any medication changes, allergies to medications, adverse drug reactions, diagnosis change, or new procedure performed?: [x] No    [] Yes (see summary sheet for update)  Subjective functional status/changes:   [] No changes reported  Pt reports the pain is down the back of her L arm and in her elbow. She notes pushing up from chairs to stand and leaning on her elbow on her chair at work (when asked by PTA).      OBJECTIVE    Modality rationale: decrease pain to improve the patients ability to improve ease of ADLs   Min Type Additional Details    [] Estim:  []Unatt       []IFC  []Premod                        []Other:  []w/ice   []w/heat  Position:  Location:    [] Estim: []Att    []TENS instruct  []NMES                    []Other:  []w/US   []w/ice   []w/heat  Position:  Location:    []  Traction: [] Cervical       []Lumbar                       [] Prone          []Supine                       []Intermittent   []Continuous Lbs:  [] before manual  [] after manual    []  Ultrasound: []Continuous   [] Pulsed                           []1MHz   []3MHz W/cm2:  Location:    []  Iontophoresis with dexamethasone         Location: [] Take home patch   [] In clinic   10 []  Ice     [x]  heat  []  Ice massage  []  Laser   []  Anodyne Position:seated  Location: L shoulder    []  Laser with stim  []  Other:  Position:  Location:    []  Vasopneumatic Device Pressure:       [] lo [] med [] hi   Temperature: [] lo [] med [] hi   [x] Skin assessment post-treatment:  [x]intact []redness- no adverse reaction    []redness - adverse reaction:     35 min Therapeutic Exercise:  [x] See flow sheet :   Rationale: increase ROM and increase strength to improve the patients ability to perform behind the back reaching and work without increased pain          With   [] TE   [] TA   [] neuro   [] other: Patient Education: [x] Review HEP    [] Progressed/Changed HEP based on:   [] positioning   [] body mechanics   [] transfers   [] heat/ice application    [] other:      Other Objective/Functional Measures:   Reviewed updated HEP  Provided with OTB for home use  TTP triceps  (+) tinels sign at the L elbow  Educated on not leaning on elbow in sitting or have cushion under elbow for pressure relief  Pain with attempt at triceps stretch    Pain Level (0-10 scale) post treatment: 0/10    ASSESSMENT/Changes in Function: Pt making steady progress towards goals with improving shoulder mobility and decreasing pain. She continues to have some triceps tenderness which could be related to use with pushing up from chairs and leaning on L elbow in sitting at work. Will continue working on mobility and gentle stability for decreased pain and return to PLOF. Patient will continue to benefit from skilled PT services to modify and progress therapeutic interventions, address functional mobility deficits, address ROM deficits, address strength deficits, analyze and address soft tissue restrictions, analyze and cue movement patterns, analyze and modify body mechanics/ergonomics, assess and modify postural abnormalities, address imbalance/dizziness and instruct in home and community integration to attain remaining goals. Progress towards goals / Updated goals:  Long Term Goals: To be accomplished in 6 weeks:  1. Pt will improve FOTO by 7 points in order to demonstrate functional improvement.   2. Pt will report </= 1/10 average pain in order to improve QOL. 3. Pt will demonstrated full and pain free AROM with L shoulder compared to R shoulder so she can perform ADLs comfortably.   4. Pt will have at least 4/5 strength with MT/LT MMT to maintain good posture and improve endurance for ADLs performance.      PLAN  [x]  Upgrade activities as tolerated     [x]  Continue plan of care  []  Update interventions per flow sheet       []  Discharge due to:_  []  Other:_      Radha Newby [facility] 9/22/2017  5:11 PM    Future Appointments  Date Time Provider Leanne Martin   10/6/2017 5:00 PM [facility]   10/9/2017 4:30 PM Wilber Styles MD [facility] 69

## 2017-09-27 ENCOUNTER — APPOINTMENT (OUTPATIENT)
Dept: PHYSICAL THERAPY | Age: 62
End: 2017-09-27
Payer: COMMERCIAL

## 2017-10-06 ENCOUNTER — HOSPITAL ENCOUNTER (OUTPATIENT)
Dept: PHYSICAL THERAPY | Age: 62
Discharge: HOME | End: 2017-10-06
Payer: COMMERCIAL

## 2017-10-06 PROCEDURE — 97110 THERAPEUTIC EXERCISES: CPT

## 2017-10-06 NOTE — PROGRESS NOTES
PT DAILY TREATMENT NOTE     Patient Name: Gail Storm  Date:10/6/2017  : 1955  [x]  Patient  Verified  Payor: Wilver Mcghee / Plan: [place] [employer]  [employer] [facility] / Product Type: Commerical /    In time: 5:00  Out time:5:40  Total Treatment Time (min): 40  Visit #: 10 of 12    Treatment Area: Left shoulder pain [M25.512]    SUBJECTIVE  Pain Level (0-10 scale): 0/10  Any medication changes, allergies to medications, adverse drug reactions, diagnosis change, or new procedure performed?: [x] No    [] Yes (see summary sheet for update)  Subjective functional status/changes:   [] No changes reported  Pt reports no pain today just some difficulty still reaching behind her back but improved. She still has some tenderness underneath her arm that feels like knots or tight spots. She wants to follow up with the MD to see what the plan should be moving forward because of her insurance/copays before deciding whether to continue or D/C from therapy.      OBJECTIVE    Modality rationale: decrease pain and increase tissue extensibility to improve the patients ability to perform behind the back reaching for dressing/bathing   Min Type Additional Details    [] Estim:  []Unatt       []IFC  []Premod                        []Other:  []w/ice   []w/heat  Position:  Location:    [] Estim: []Att    []TENS instruct  []NMES                    []Other:  []w/US   []w/ice   []w/heat  Position:  Location:    []  Traction: [] Cervical       []Lumbar                       [] Prone          []Supine                       []Intermittent   []Continuous Lbs:  [] before manual  [] after manual    []  Ultrasound: []Continuous   [] Pulsed                           []1MHz   []3MHz W/cm2:  Location:    []  Iontophoresis with dexamethasone         Location: [] Take home patch   [] In clinic   10 []  Ice     [x]  heat  []  Ice massage  []  Laser   []  Anodyne Position:seated  Location: L shoulder    []  Laser with stim  []  Other:  Position:  Location: []  Vasopneumatic Device Pressure:       [] lo [] med [] hi   Temperature: [] lo [] med [] hi   [x] Skin assessment post-treatment:  [x]intact []redness- no adverse reaction    []redness - adverse reaction:     30 min Therapeutic Exercise:  [x] See flow sheet :   Rationale: increase ROM and increase strength to improve the patients ability to improve ease of performing behind the back reaching/dressing          With   [] TE   [] TA   [] neuro   [] other: Patient Education: [x] Review HEP    [] Progressed/Changed HEP based on:   [] positioning   [] body mechanics   [] transfers   [] heat/ice application    [] other:      Other Objective/Functional Measures:   Reviewed HEP for correct form  Educated on foam rolling to triceps and pt with decreased pain post 1 minute of rolling  Will place on hold until MD follow up  Full AROM with slight shoulder pain in functional IR more in triceps     Pain Level (0-10 scale) post treatment: 0/10    ASSESSMENT/Changes in Function: Pt making steady progress towards goals with decreased pain and improving AROM. She still has slight discomfort with functional IR behind the back in the triceps muscle belly. Will await MD recommendations at follow to decide on continuation of therapy versus D/C at this time. Pt is independent with home stretching/strengthening program.     Patient will continue to benefit from skilled PT services to modify and progress therapeutic interventions, address functional mobility deficits, address ROM deficits, address strength deficits, analyze and address soft tissue restrictions, analyze and cue movement patterns, analyze and modify body mechanics/ergonomics, assess and modify postural abnormalities, address imbalance/dizziness and instruct in home and community integration to attain remaining goals. Progress towards goals / Updated goals:  Long Term Goals: To be accomplished in 6 weeks:  1.  Pt will improve FOTO by 7 points in order to demonstrate functional improvement.   2. Pt will report </= 1/10 average pain in order to improve QOL. Progressing; 0/10 (10/6/17)  3. Pt will demonstrated full and pain free AROM with L shoulder compared to R shoulder so she can perform ADLs comfortably. Progressing full AROM besides behind the back reaching  4. Pt will have at least 4/5 strength with MT/LT MMT to maintain good posture and improve endurance for ADLs performance.      PLAN  [x]  Upgrade activities as tolerated     [x]  Continue plan of care  []  Update interventions per flow sheet       []  Discharge due to:_  []  Other:_      Ovi Jerome, HAYLEE 10/6/2017  5:10 PM    Future Appointments  Date Time Provider Leanne Martin   10/9/2017 4:30 PM MD [facility] 69

## 2017-10-09 ENCOUNTER — OFFICE VISIT (OUTPATIENT)
Dept: ORTHOPEDIC SURGERY | Age: 62
End: 2017-10-09

## 2017-10-09 VITALS
TEMPERATURE: 97.4 F | HEIGHT: 60 IN | WEIGHT: 132 LBS | BODY MASS INDEX: 25.91 KG/M2 | DIASTOLIC BLOOD PRESSURE: 56 MMHG | SYSTOLIC BLOOD PRESSURE: 120 MMHG | OXYGEN SATURATION: 98 % | HEART RATE: 88 BPM

## 2017-10-09 DIAGNOSIS — M75.02 ADHESIVE CAPSULITIS OF LEFT SHOULDER: Primary | ICD-10-CM

## 2017-10-09 NOTE — PATIENT INSTRUCTIONS
Frozen Shoulder: Care Instructions  Your Care Instructions    Frozen shoulder is stiffness, pain, and trouble moving your shoulder. It may happen after an injury or overuse, or from a disease such as diabetes or a stroke. You may have pain that keeps you from using your shoulder. However, you need to move your shoulder. If you do not move it, it will get more stiff and sore. Your doctor may order an X-ray to make sure there is not another cause for your stiff shoulder. You can treat frozen shoulder with heat, stretching, over-the-counter pain medicines, and physical therapy. Your doctor also may inject medicine into your shoulder to reduce pain and swelling. It can take a year or more to get better. Surgery is almost never needed. Follow-up care is a key part of your treatment and safety. Be sure to make and go to all appointments, and call your doctor if you are having problems. It's also a good idea to know your test results and keep a list of the medicines you take. How can you care for yourself at home? · Take pain medicines exactly as directed. ¨ If the doctor gave you a prescription medicine for pain, take it as prescribed. ¨ If you are not taking a prescription pain medicine, ask your doctor if you can take an over-the-counter medicine. · Put a heating pad set on low or a warm, wet towel wrapped in plastic on your shoulder. The heat may make it easier to stretch your shoulder. · Follow your doctor's advice for stretches and exercises. · Go to physical therapy if your doctor suggests it. · Try these stretching exercises to reduce stiffness if your doctor says it is okay. Do the exercises slowly to avoid injury. Put a warm, wet towel on your shoulder before exercising. Stop any exercise that increases pain. ¨ Pendulum exercise. While leaning forward and holding onto a table or the back of a chair with your good arm, bend at the waist, allowing your affected arm to hang straight down.  Swing the affected arm back and forth like a pendulum, then in circles that start small and gradually grow larger as pain allows. Try this for about 2 or 3 minutes, several times a day. Once pain begins to go away, you can do this exercise while holding a 1- or 2-pound weight. ¨ Wall climbing (to the side). Stand with your side to a wall so that your fingers can just touch it. Then turn so your body is turned slightly toward the wall. Walk the fingers of your injured arm up the wall as high as pain permits. Hold that position for a count of 15 to 30 seconds. Walk your fingers down to the starting position. Repeat 2 to 4 times, trying to reach higher each time. ¨ Wall climbing (to the front). Face a wall, standing so your fingers can just touch it. Walk the fingers of your affected arm up the wall as high as pain permits. Hold that position for a count of 15 to 30 seconds. Slowly walk your fingers to the starting position. Repeat 2 to 4 times, trying to reach higher each time. When should you call for help? Call your doctor now or seek immediate medical care if:  · You have severe pain. · Your arm is cool or pale or changes color. · You have tingling or numbness in your arm. Watch closely for changes in your health, and be sure to contact your doctor if:  · You have increased pain. · You have new pain that develops in another area. For example, you have pain in your arm, hand, or elbow. · You do not get better as expected. Where can you learn more? Go to http://rocio-chris.info/. Enter M736 in the search box to learn more about \"Frozen Shoulder: Care Instructions. \"  Current as of: March 21, 2017  Content Version: 11.3  © 9822-2073 [employer]. Care instructions adapted under license by [employer] (which disclaims liability or warranty for this information).  If you have questions about a medical condition or this instruction, always ask your healthcare professional. [employer] disclaims any warranty or liability for your use of this information.

## 2017-10-09 NOTE — PROGRESS NOTES
James Fortune  1955   Chief Complaint   Patient presents with    Shoulder Pain     LEFT        HISTORY OF PRESENT ILLNESS  James Fortune is a 63 y.o. female who presents today for reevaluation of left shoulder pain. At last OV, patient referred for course of PT and provided prescription for Mobic. She rates her pain 0/10 today. She has attended PT. She feels she has had good improvement in her mobility and pain. She notes some discomfort with reaching behind her back. She is no longer having night pain. Patient denies any fever, chills, chest pain, shortness of breath or calf pain. There are no new illness or injuries to report since last seen in the office. There are no changes to medications, allergies, family or social history. PHYSICAL EXAM:   Visit Vitals    /56    Pulse 88    Temp 97.4 °F (36.3 °C)    Ht 5' (1.524 m)    Wt 132 lb (59.9 kg)    SpO2 98%    BMI 25.78 kg/m2     The patient is a well-developed, well-nourished female   in no acute distress. The patient is alert and oriented times three. The patient is alert and oriented times three. Mood and affect are normal.  LYMPHATIC: lymph nodes are not enlarged and are within normal limits  SKIN: normal in color and non tender to palpation. There are no bruises or abrasions noted. NEUROLOGICAL: Motor sensory exam is within normal limits. Reflexes are equal bilaterally.  There is normal sensation to pinprick and light touch  MUSCULOSKELETAL:  Examination Left shoulder   Skin Intact   AC joint tenderness -   Biceps tenderness -   Forward flexion/Elevation    Active abduction    Glenohumeral abduction 80   External rotation ROM 60   Internal rotation ROM 30   Apprehension -   Heathers Relocation -   Jerk -   Load and Shift -   Obriens -   Speeds -   Impingement sign -   Supraspinatus/Empty Can -, 5/5   External Rotation Strength -, 5/5   Lift Off/Belly Press -, 5/5   Neurovascular Intact         IMAGING: XR of the cervical spine dated 8/15/17 was reviewed and read: loss of cervical lordosis with spondylotic changes at 3-4 4-5 5-6 markedly decreased disc space      IMPRESSION:      ICD-10-CM ICD-9-CM    1. Adhesive capsulitis of left shoulder M75.02 726.0         PLAN:   1. Patient has made good progress with her left shoulder pain. She is pleased with her progress at this time. She will continue her HEP and activities as tolerated. Risk factors include: n/a  2. No cortisone injection indicated today   3. No Physical/Occupational Therapy indicated today  4. No diagnostic test indicated today  5. No durable medical equipment indicated today  6. No referral indicated today   7. No medications indicated today  8. No Narcotic indicated today     RTC PRN  Follow-up Disposition: Not on File    Scribed by Lili Koenig [facility]) as dictated by Katerine Barnes MD    I, Dr. Katerine Barnes, confirm that all documentation is accurate.     Katerine Barnes M.D.   [facility]

## 2017-11-06 NOTE — PROGRESS NOTES
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 Status at last note/certification: Progressing; 0/10 (10/6/17)  Status at discharge: not met    Goal: Pt will demonstrated full and pain free AROM with L shoulder compared to R shoulder so she can perform ADLs comfortably. Status at last note/certification:  Progressing full AROM besides behind the back reaching  Status at discharge: not met    Goal: Pt will have at least 4/5 strength with MT/LT MMT to maintain good posture and improve endurance for ADLs performance. Status at last note/certification: not met  Status at discharge: not met      ASSESSMENT/RECOMMENDATIONS: pt reported no pain and also made good progress towards ROM and strengthening goals. She also demonstrated concern considering cont PT due to insurance status. Pt discussed with MD and would be discharged at this time as MD's instruction.  Pt was independent with updated HEP for further strengthening and progressing with AROM of L shoulder.      [x]Discontinue therapy: [x]Patient has reached or is progressing toward set goals and MD's instruction      []Patient is non-compliant or has abdicated      []Due to lack of appreciable progress towards set goals    Billie Ac, PT 11/6/2017 1:33 PM

## 2018-02-02 ENCOUNTER — HOSPITAL ENCOUNTER (OUTPATIENT)
Dept: MAMMOGRAPHY | Age: 63
Discharge: HOME | End: 2018-02-02
Attending: FAMILY MEDICINE
Payer: COMMERCIAL

## 2018-02-02 DIAGNOSIS — Z12.39 BREAST CANCER SCREENING: ICD-10-CM

## 2018-02-02 PROCEDURE — 77067 SCR MAMMO BI INCL CAD: CPT

## 2018-03-22 ENCOUNTER — OFFICE VISIT (OUTPATIENT)
Dept: ORTHOPEDIC SURGERY | Age: 63
End: 2018-03-22

## 2018-03-22 VITALS
DIASTOLIC BLOOD PRESSURE: 49 MMHG | TEMPERATURE: 97.8 F | HEIGHT: 60 IN | HEART RATE: 87 BPM | OXYGEN SATURATION: 98 % | RESPIRATION RATE: 12 BRPM | BODY MASS INDEX: 24.94 KG/M2 | SYSTOLIC BLOOD PRESSURE: 126 MMHG | WEIGHT: 127 LBS

## 2018-03-22 DIAGNOSIS — M22.2X1 PATELLOFEMORAL SYNDROME, RIGHT: Primary | ICD-10-CM

## 2018-03-22 DIAGNOSIS — M25.561 RIGHT KNEE PAIN, UNSPECIFIED CHRONICITY: ICD-10-CM

## 2018-03-22 RX ORDER — MELOXICAM 15 MG/1
15 TABLET ORAL
Qty: 30 TAB | Refills: 1 | Status: SHIPPED | OUTPATIENT
Start: 2018-03-22 | End: 2018-05-21 | Stop reason: SDUPTHER

## 2018-03-22 NOTE — MR AVS SNAPSHOT
[address] [place] [address], [address] [place] 
[phone] Patient: Destin Bynum MRN: [id number] :1955 Visit Information Date & Time Provider Department Dept. Phone Encounter #  
 3/22/2018  8:30 AM Dilip Arreaga MD [facility] [phone] Upcoming Health Maintenance Date Due Hepatitis C Screening 1955 DTaP/Tdap/Td series (1 - Tdap) 10/23/1978 FOBT Q 1 YEAR AGE 50-75 10/23/2007 PAP AKA CERVICAL CYTOLOGY 10/7/2016 Influenza Age 5 to Adult 2017 ZOSTER VACCINE AGE 60> 2017 BREAST CANCER SCRN MAMMOGRAM 2020 Allergies as of 3/22/2018  Review Complete On: 3/22/2018 By: Rachel Silva No Known Allergies Current Immunizations  Reviewed on 2013 Name Date Influenza Vaccine Whole 10/13/2011 PPD 2012 Not reviewed this visit You Were Diagnosed With   
  
 Codes Comments Patellofemoral syndrome, right    -  Primary ICD-10-CM: M22.2X1 ICD-9-CM: 719.46 Right knee pain, unspecified chronicity     ICD-10-CM: M25.561 ICD-9-CM: 719.46 Vitals BP Pulse Temp Resp Height(growth percentile) Weight(growth percentile) 126/49 87 97.8 °F (36.6 °C) 12 5' (1.524 m) 127 lb (57.6 kg) SpO2 BMI OB Status Smoking Status 98% 24.8 kg/m2 Postmenopausal Never Smoker Vitals History BMI and BSA Data Body Mass Index Body Surface Area  
 24.8 kg/m 2 1.56 m 2 Preferred Pharmacy Pharmacy Name Phone [id number], [facility] [facility] [phone] Your Updated Medication List  
  
   
This list is accurate as of 3/22/18  9:06 AM.  Always use your most recent med list.  
  
  
  
  
 ADDERALL XR 30 mg XR capsule Generic drug:  amphetamine-dextroamphetamine XR Take 30 mg by mouth every morning. calcium carbonate-vitamin d2 500 mg(1,250mg) -200 unit Tab Take 1 Tab by mouth daily. CENTRUM COMPLETE PO Take  by mouth daily. estradiol 10 mcg Tab vaginal tablet Commonly known as:  Carrie Tyler Insert 10 mcg into vagina every Tuesday and Friday. FLUoxetine 20 mg capsule Commonly known as:  PROzac TAKE 1 CAPSULE BY MOUTH ONCE DAILY  
  
 meloxicam 15 mg tablet Commonly known as:  MOBIC Take 1 Tab by mouth daily (with breakfast). ZOLOFT 100 mg tablet Generic drug:  sertraline Take  by mouth daily. We Performed the Following AMB POC XRAY, KNEE; 1/2 VIEWS [51260 CPT(R)] Introducing \[facility] [place] [facility]\"" & HEALTH SERVICES! Bernabe Ojeda introduces [employer] patient portal. Now you can access parts of your medical record, email your doctor's office, and request medication refills online. 1. In your internet browser, go to [email or web address]://[email or web address] [facility] [email or web address]. [employer]/[facility] [facility] [facility] [employer] 2. Click on the First Time User? Click Here link in the Sign In box. You will see the New Member Sign Up page. 3. Enter your [employer] Access Code exactly as it appears below. You will not need to use this code after youve completed the sign-up process. If you do not sign up before the expiration date, you must request a new code. · [employer] Access Code: [address]. Expires: 4/23/2018  2:00 PM 
 
4. Enter the last four digits of your Social Security Number (xxxx) and Date of Birth (mm/dd/yyyy) as indicated and click Submit. You will be taken to the next sign-up page. 5. Create a [email or web address] ID. This will be your [employer] login ID and cannot be changed, so think of one that is secure and easy to remember. 6. Create a [employer] password. You can change your password at any time. 7. Enter your Password Reset Question and Answer. This can be used at a later time if you forget your password. 8. Enter your e-mail address. You will receive e-mail notification when new information is available in [address] 9. Click Sign Up. You can now view and download portions of your medical record. 10. Click the Download Summary menu link to download a portable copy of your medical information. If you have questions, please visit the Frequently Asked Questions section of the [employer] website. Remember, [employer] is NOT to be used for urgent needs. For medical emergencies, dial 911. Now available from your iPhone and Android! Please provide this summary of care documentation to your next provider. Your primary care clinician is listed as [address] [phone]. If you have any questions after today's visit, please call [phone].

## 2018-03-22 NOTE — PROGRESS NOTES
José Manuel Ovalle  1955   Chief Complaint   Patient presents with    Knee Pain     rt        HISTORY OF PRESENT ILLNESS  José Manuel Ovalle is a 63 y.o. female who presents today for evaluation of right knee pain. she rates her pain 8/10 today. Pain has been present since November 2017. She went on a long bus trip and then the pain started. Patient describes the pain as aching and dull that is Intermittent in nature. Symptoms are worse with prolonged walking and standing, certain movements of the leg, Activity and is better with  Rest. Associated symptoms include stiffness, limping. Since problem started, it: has worsened. Pain does not wake patient up at night. Has taken Tylenol, ibuprofen for the problem. Has tried following treatments: Injections:NO; Brace:NO; Therapy:NO; Cane/Crutch:NO  Patient denies any fever, chills, chest pain, shortness of breath or calf pain. There are no new illness or injuries to report since last seen in the office. There are no changes to medications, allergies, family or social history. PHYSICAL EXAM:   Visit Vitals    /49    Pulse 87    Temp 97.8 °F (36.6 °C)    Resp 12    Ht 5' (1.524 m)    Wt 127 lb (57.6 kg)    SpO2 98%    BMI 24.8 kg/m2     The patient is a well-developed, well-nourished female   in no acute distress. The patient is alert and oriented times three. The patient is alert and oriented times three. Mood and affect are normal.  LYMPHATIC: lymph nodes are not enlarged and are within normal limits  SKIN: normal in color and non tender to palpation. There are no bruises or abrasions noted. NEUROLOGICAL: Motor sensory exam is within normal limits. Reflexes are equal bilaterally.  There is normal sensation to pinprick and light touch  MUSCULOSKELETAL:  Examination Right knee   Skin Intact   Range of motion 0-130   Effusion -   Medial joint line tenderness -   Lateral joint line tenderness -   Tenderness Pes Bursa -   Tenderness insertion MCL - Tenderness insertion LCL -   Michelles -   Patella crepitus -   Patella grind -   Lachman -   Pivot shift -   Anterior drawer -   Posterior drawer -   Varus stress -   Valgus stress -   Neurovascular Intact   Calf Swelling and Tenderness to Palpation -   Lola's Test -   Hamstring Cord Tightness +       IMAGING: XR of the right knee dated 3/22/18 was reviewed and read: no acute abnormalities      IMPRESSION:      ICD-10-CM ICD-9-CM    1. Patellofemoral syndrome, right M22.2X1 719.46 meloxicam (MOBIC) 15 mg tablet   2. Right knee pain, unspecified chronicity M25.561 719.46 AMB POC XRAY, KNEE; 1/2 VIEWS        PLAN:   1. Provided with hamstring stretches. If she doesn't have relief in a few weeks, she may return for a cortisone injection. Risk factors include: n/a  2. No cortisone injection indicated today   3. No Physical/Occupational Therapy indicated today  4. No diagnostic test indicated today  5. No durable medical equipment indicated today  6. No referral indicated today   7. Yes medications indicated today MOBIC  8.  No Narcotic indicated today       RTC 3 weeks if pain continues  Follow-up Disposition: Not on File    Scribed by Dee Dec [address]) as dictated by EDY Conti [address] and Spine Specialist

## 2018-03-26 NOTE — PATIENT INSTRUCTIONS
Patient has been seen by our office within the last 3 years, they are not considered a new patient. LOS corrected.

## 2018-05-21 DIAGNOSIS — M22.2X1 PATELLOFEMORAL SYNDROME, RIGHT: ICD-10-CM

## 2018-05-21 RX ORDER — MELOXICAM 15 MG/1
TABLET ORAL
Qty: 30 TAB | Refills: 1 | Status: SHIPPED | OUTPATIENT
Start: 2018-05-21 | End: 2018-07-21 | Stop reason: SDUPTHER

## 2018-07-21 DIAGNOSIS — M22.2X1 PATELLOFEMORAL SYNDROME, RIGHT: ICD-10-CM

## 2018-07-23 RX ORDER — MELOXICAM 15 MG/1
TABLET ORAL
Qty: 30 TAB | Refills: 1 | Status: SHIPPED | OUTPATIENT
Start: 2018-07-23 | End: 2018-08-23 | Stop reason: SDUPTHER

## 2018-08-10 ENCOUNTER — HOSPITAL ENCOUNTER (OUTPATIENT)
Age: 63
Discharge: HOME | End: 2018-08-10
Attending: FAMILY MEDICINE
Payer: COMMERCIAL

## 2018-08-10 DIAGNOSIS — R51.9 HEADACHE: ICD-10-CM

## 2018-08-10 LAB — CREAT UR-MCNC: 0.9 MG/DL (ref 0.6–1.3)

## 2018-08-10 PROCEDURE — 82565 ASSAY OF CREATININE: CPT

## 2018-08-10 PROCEDURE — 74011250636 HC RX REV CODE- 250/636

## 2018-08-10 PROCEDURE — 70553 MRI BRAIN STEM W/O & W/DYE: CPT

## 2018-08-10 PROCEDURE — A9575 INJ GADOTERATE MEGLUMI 0.1ML: HCPCS

## 2018-08-10 RX ORDER — GADOTERATE MEGLUMINE 376.9 MG/ML
11 INJECTION INTRAVENOUS
Status: COMPLETED | OUTPATIENT
Start: 2018-08-10 | End: 2018-08-10

## 2018-08-10 RX ADMIN — GADOTERATE MEGLUMINE 11 ML: 376.9 INJECTION INTRAVENOUS at 18:00

## 2018-08-15 ENCOUNTER — TELEPHONE (OUTPATIENT)
Dept: NEUROLOGY | Age: 63
End: 2018-08-15

## 2018-08-15 NOTE — TELEPHONE ENCOUNTER
Notes received from [address] [place] [address] in Dr. Fatou Victor folder @ [address] [place] [address]

## 2018-08-17 ENCOUNTER — DOCUMENTATION ONLY (OUTPATIENT)
Dept: NEUROLOGY | Age: 63
End: 2018-08-17

## 2018-08-22 ENCOUNTER — TELEPHONE (OUTPATIENT)
Dept: NEUROLOGY | Age: 63
End: 2018-08-22

## 2018-08-22 ENCOUNTER — DOCUMENTATION ONLY (OUTPATIENT)
Dept: NEUROLOGY | Age: 63
End: 2018-08-22

## 2018-08-22 NOTE — TELEPHONE ENCOUNTER
Notes received from [address] [place] [address] [place] in Dr. Santana Blood folder @ [address] [place] [address]

## 2018-08-23 ENCOUNTER — OFFICE VISIT (OUTPATIENT)
Dept: NEUROLOGY | Age: 63
End: 2018-08-23

## 2018-08-23 VITALS
HEART RATE: 88 BPM | HEIGHT: 60 IN | BODY MASS INDEX: 25.05 KG/M2 | RESPIRATION RATE: 18 BRPM | DIASTOLIC BLOOD PRESSURE: 80 MMHG | WEIGHT: 127.6 LBS | TEMPERATURE: 99.4 F | OXYGEN SATURATION: 96 % | SYSTOLIC BLOOD PRESSURE: 130 MMHG

## 2018-08-23 DIAGNOSIS — G47.00 INSOMNIA, UNSPECIFIED TYPE: ICD-10-CM

## 2018-08-23 DIAGNOSIS — I67.9 CEREBROVASCULAR DISEASE: ICD-10-CM

## 2018-08-23 DIAGNOSIS — G25.0 ESSENTIAL TREMOR: ICD-10-CM

## 2018-08-23 DIAGNOSIS — M54.81 OCCIPITAL NEURALGIA OF RIGHT SIDE: Primary | ICD-10-CM

## 2018-08-23 DIAGNOSIS — R41.89 COGNITIVE IMPAIRMENT: ICD-10-CM

## 2018-08-23 NOTE — MR AVS SNAPSHOT
[address] [facility] [address] 
 
 
 [address] [facility] [address] [facility] [address] 
[phone] Patient: Raj Moore MRN: [id number] :1955 Visit Information Date & Time Provider Department Dept. Phone Encounter #  
 2018  9:00 AM Shaun Almaguer MD [facility] at [address] [facility] [id number] Follow-up Instructions Return in about 3 months (around 2018). Follow-up and Disposition History Your Appointments 2018  9:00 AM  
Follow Up with Shaun Almaguer MD  
[facility] at [address] [facility] Appt Note: 3 month fu  
 [address] [facility] [address] Andrea Rad [address] [facility] Upcoming Health Maintenance Date Due Hepatitis C Screening 1955 DTaP/Tdap/Td series (1 - Tdap) 10/23/1978 FOBT Q 1 YEAR AGE 50-75 10/23/2007 PAP AKA CERVICAL CYTOLOGY 10/7/2016 ZOSTER VACCINE AGE 60> 2017 Influenza Age 5 to Adult 2018 BREAST CANCER SCRN MAMMOGRAM 2020 Allergies as of 2018  Review Complete On: 2018 By: Melita Baldwin LPN No Known Allergies Current Immunizations  Reviewed on 2013 Name Date Influenza Vaccine Whole 10/13/2011 PPD 2012 Not reviewed this visit You Were Diagnosed With   
  
 Codes Comments Occipital neuralgia of right side    -  Primary ICD-10-CM: M54.81 ICD-9-CM: 723.8 Essential tremor     ICD-10-CM: G25.0 ICD-9-CM: 333.1 Cognitive impairment     ICD-10-CM: R41.89 ICD-9-CM: 294.9 Insomnia, unspecified type     ICD-10-CM: G47.00 ICD-9-CM: 780.52 Cerebrovascular disease     ICD-10-CM: I67.9 ICD-9-CM: 437.9 Vitals BP Pulse Temp Resp Height(growth percentile) Weight(growth percentile)  130/80 (BP 1 Location: Left arm, BP Patient Position: Sitting) 88 99.4 °F (37.4 °C) (Oral) 18 5' (1.524 m) 127 lb 9.6 oz (57.9 kg) SpO2 BMI OB Status Smoking Status 96% 24.92 kg/m2 Postmenopausal Never Smoker Vitals History BMI and BSA Data Body Mass Index Body Surface Area 24.92 kg/m 2 1.57 m 2 Preferred Pharmacy Pharmacy Name Phone [id number] Brittani Prieto, [address] [facility] [facility] [phone] Your Updated Medication List  
  
   
This list is accurate as of 8/23/18  9:59 AM.  Always use your most recent med list.  
  
  
  
  
 ADDERALL XR 30 mg XR capsule Generic drug:  amphetamine-dextroamphetamine XR Take 30 mg by mouth every morning. calcium carbonate-vitamin d2 500 mg(1,250mg) -200 unit Tab Take 1 Tab by mouth daily. CENTRUM COMPLETE PO Take  by mouth daily. estradiol 10 mcg Tab vaginal tablet Commonly known as:  Norman Eden Insert 10 mcg into vagina every Tuesday and Friday. FLUoxetine 20 mg capsule Commonly known as:  PROzac TAKE 1 CAPSULE BY MOUTH ONCE DAILY  
  
 meloxicam 15 mg tablet Commonly known as:  MOBIC Take 1 Tab by mouth daily (with breakfast). Follow-up Instructions Return in about 3 months (around 11/23/2018). Patient Instructions Neck Arthritis: Exercises Your Care Instructions Here are some examples of typical rehabilitation exercises for your condition. Start each exercise slowly. Ease off the exercise if you start to have pain. Your doctor or physical therapist will tell you when you can start these exercises and which ones will work best for you. How to do the exercises Neck stretches to the side 1. This stretch works best if you keep your shoulder down as you lean away from it. To help you remember to do this, start by relaxing your shoulders and lightly holding on to your thighs or your chair. 2. Tilt your head toward your shoulder and hold for 15 to 30 seconds. Let the weight of your head stretch your muscles. 3. Repeat 2 to 4 times toward each shoulder. Chin tuck 1. Lie on the floor with a rolled-up towel under your neck. Your head should be touching the floor. 2. Slowly bring your chin toward your chest. 
3. Hold for a count of 6, and then relax for up to 10 seconds. 4. Repeat 8 to 12 times. Active cervical rotation 1. Sit in a firm chair, or stand up straight. 2. Keeping your chin level, turn your head to the right, and hold for 15 to 30 seconds. 3. Turn your head to the left and hold for 15 to 30 seconds. 4. Repeat 2 to 4 times to each side. Shoulder blade squeeze 1. While standing, squeeze your shoulder blades together. 2. Do not raise your shoulders up as you are squeezing. 3. Hold for 6 seconds. 4. Repeat 8 to 12 times. Shoulder rolls 1. Sit comfortably with your feet shoulder-width apart. You can also do this exercise standing up. 2. Roll your shoulders up, then back, and then down in a smooth, circular motion. 3. Repeat 2 to 4 times. Follow-up care is a key part of your treatment and safety. Be sure to make and go to all appointments, and call your doctor if you are having problems. It's also a good idea to know your test results and keep a list of the medicines you take. Where can you learn more? Go to http://rocio-chris.info/. Enter G660 in the search box to learn more about \"Neck Arthritis: Exercises. \" Current as of: November 29, 2017 Content Version: 11.7 © 1092-1844 Healthwise, Incorporated. Care instructions adapted under license by [employer] (which disclaims liability or warranty for this information). If you have questions about a medical condition or this instruction, always ask your healthcare professional. [address] any warranty or liability for your use of this information. Patient Instructions History Introducing [facility]!    
 [facility] introduces [employer] patient portal. Now you can access parts of your medical record, email your doctor's office, and request medication refills online. 1. In your internet browser, go to [email or web address] 2. Click on the First Time User? Click Here link in the Sign In box. You will see the New Member Sign Up page. 3. Enter your FitVia Access Code exactly as it appears below. You will not need to use this code after youve completed the sign-up process. If you do not sign up before the expiration date, you must request a new code. · FitVia Access Code: [email or web address] Expires: 11/1/2018  3:37 PM 
 
4. Enter the last four digits of your Social Security Number (xxxx) and Date of Birth (mm/dd/yyyy) as indicated and click Submit. You will be taken to the next sign-up page. 5. Create a FitVia ID. This will be your FitVia login ID and cannot be changed, so think of one that is secure and easy to remember. 6. Create a FitVia password. You can change your password at any time. 7. Enter your Password Reset Question and Answer. This can be used at a later time if you forget your password. 8. Enter your e-mail address. You will receive e-mail notification when new information is available in [address] 9. Click Sign Up. You can now view and download portions of your medical record. 10. Click the Download Summary menu link to download a portable copy of your medical information. If you have questions, please visit the Frequently Asked Questions section of the FitVia website. Remember, FitVia is NOT to be used for urgent needs. For medical emergencies, dial 911. Now available from your iPhone and Android! Please provide this summary of care documentation to your next provider. Your primary care clinician is listed as [address]. If you have any questions after today's visit, please call [phone].

## 2018-08-23 NOTE — PATIENT INSTRUCTIONS
Neck Arthritis: Exercises  Your Care Instructions  Here are some examples of typical rehabilitation exercises for your condition. Start each exercise slowly. Ease off the exercise if you start to have pain. Your doctor or physical therapist will tell you when you can start these exercises and which ones will work best for you. How to do the exercises  Neck stretches to the side    1. This stretch works best if you keep your shoulder down as you lean away from it. To help you remember to do this, start by relaxing your shoulders and lightly holding on to your thighs or your chair. 2. Tilt your head toward your shoulder and hold for 15 to 30 seconds. Let the weight of your head stretch your muscles. 3. Repeat 2 to 4 times toward each shoulder. Chin tuck    1. Lie on the floor with a rolled-up towel under your neck. Your head should be touching the floor. 2. Slowly bring your chin toward your chest.  3. Hold for a count of 6, and then relax for up to 10 seconds. 4. Repeat 8 to 12 times. Active cervical rotation    1. Sit in a firm chair, or stand up straight. 2. Keeping your chin level, turn your head to the right, and hold for 15 to 30 seconds. 3. Turn your head to the left and hold for 15 to 30 seconds. 4. Repeat 2 to 4 times to each side. Shoulder blade squeeze    1. While standing, squeeze your shoulder blades together. 2. Do not raise your shoulders up as you are squeezing. 3. Hold for 6 seconds. 4. Repeat 8 to 12 times. Shoulder rolls    1. Sit comfortably with your feet shoulder-width apart. You can also do this exercise standing up. 2. Roll your shoulders up, then back, and then down in a smooth, circular motion. 3. Repeat 2 to 4 times. Follow-up care is a key part of your treatment and safety. Be sure to make and go to all appointments, and call your doctor if you are having problems. It's also a good idea to know your test results and keep a list of the medicines you take.   Where can you learn more? Go to http://rocio-crhis.info/. Enter E259 in the search box to learn more about \"Neck Arthritis: Exercises. \"  Current as of: November 29, 2017  Content Version: 11.7  © 9021-9586 [employer]. Care instructions adapted under license by [employer] (which disclaims liability or warranty for this information). If you have questions about a medical condition or this instruction, always ask your healthcare professional. [address] any warranty or liability for your use of this information.

## 2018-08-23 NOTE — PROGRESS NOTES
Daniel uHntley is a 63 y.o. female new patient referred by Dr. Zoraida Coughlin to discuss occipital HA. Patient report no pain today.

## 2018-08-23 NOTE — PROGRESS NOTES
HPI:  62 y/o female referred by Dr. Jackie Marks for a cC of headaches. She comes with her . She remembers about 20 yrs she was having   Bifrontal once month headache and were blamed on menopause, perhaps she had light sensitivity, and she took sumatriptan for some time, which helped. After a couple of years she stopped having them. Over the last year she started having them again. She estimates they happen once a week to a couple of times a month. Sometimes she wakes up from them, and almost always they are right occipital, but sometimes would have one that is all over. She had no light/noise sensitivity or nausea with them. She takes ibuprofen and this helps. Additionally she notes that for the last year or so she has had a mild intentional tremor of both hands. Her chin also at times appears to quiver. Her father had a tremor, but he also had some form of psych disease and was apparently on strong antipsychotics. She has no other history of tremors in her family. Furthermore she adds that she has noticed that her attention has diminished somewhat as she ages. She has more problems remembering things that she did before. She works as a , in front of the computer 8 hours a day, 5 times a week. 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 She has problems with attention that have led to chronic amphetamine use, she is currently on Adderall and comments that she has been taking amphetamines for 20 or perhaps more years. She is under the care of a psychiatrist.  She is also on fluoxetine. Social History     Social History    Marital status:      Spouse name: N/A    Number of children: N/A    Years of education: N/A     Occupational History    Not on file.      Social History Main Topics    Smoking status: Never Smoker    Smokeless tobacco: Never Used    Alcohol use Yes      Comment: araceli Levin Drug use: No    Sexual activity: Not on file     Other Topics Concern    Not on file     Social History Narrative       Family History   Problem Relation Age of Onset    Hypertension Mother     Thyroid Disease Mother     Hypertension Father     Heart Attack Father     Other Father      circulation problems    Cancer Father     Colon Polyps Father        Current Outpatient Prescriptions   Medication Sig Dispense Refill    FLUoxetine (PROZAC) 20 mg capsule TAKE 1 CAPSULE BY MOUTH ONCE DAILY  3    meloxicam (MOBIC) 15 mg tablet Take 1 Tab by mouth daily (with breakfast). 30 Tab 1    amphetamine-dextroamphetamine XR (ADDERALL XR) 30 mg XR capsule Take 30 mg by mouth every morning.  estradiol (VAGIFEM) 10 mcg Tab vaginal tablet Insert 10 mcg into vagina every Tuesday and Friday.  calcium carbonate-vitamin d2 500 mg(1,250mg) -200 unit Tab Take 1 Tab by mouth daily.  MULTIVITAMIN/IRON/FOLIC ACID (CENTRUM COMPLETE PO) Take  by mouth daily.  sertraline (ZOLOFT) 100 mg tablet Take  by mouth daily.          Past Medical History:   Diagnosis Date    ADD (attention deficit disorder) without hyperactivity     Arthritis     knees    Depression     Migraine     UTI (urinary tract infection)        Past Surgical History:   Procedure Laterality Date    HX  SECTION  91 & 96    HX PELVIC LAPAROSCOPY      (+)endometriosis    HX TUBAL LIGATION      WI COLONOSCOPY FLX DX W/COLLJ SPEC WHEN PFRMD  2009    normal ; dr. Wilmer Larson       No Known Allergies    Patient Active Problem List   Diagnosis Code    ADD (attention deficit disorder) without hyperactivity F98.8    Depression F32.9    Migraine headache G43.909         Review of Systems:   Constitutional: no fever or chills  Skin denies rash or itching  HEENT:  Denies tinnitus, hearing loss, or visual changes  Respiratory: denies shortness of breath  Cardiovascular: denies chest pain, dyspnea on exertion  Gastrointestinal: does not report nausea or vomiting  Genitourinary: does not report dysuria or incontinence  Musculoskeletal: does not report joint pain or swelling  Endocrine: denies weight change  Hematology: denies easy bruising or bleeding   Neurological: as above in HPI      PHYSICAL EXAMINATION:      VITAL SIGNS:    Visit Vitals    /80 (BP 1 Location: Left arm, BP Patient Position: Sitting)    Pulse 88    Temp 99.4 °F (37.4 °C) (Oral)    Resp 18    Ht 5' (1.524 m)    Wt 57.9 kg (127 lb 9.6 oz)    SpO2 96%    BMI 24.92 kg/m2       GENERAL: Well developed, well nourished, in no apparent distress. HEART: RR, no murmurs heard, no carotid bruits  EXTREMITIES: No clubbing, cyanosis, or edema is identified. Pulses 2+    and symmetrical.  HEAD:   Normocephalic, atraumatic. Full cervical range of motion, no occipital trigger points    NEUROLOGIC EXAMINATION    MENTAL STATUS: Awake, alert, and oriented x 4. Attention and STM are grossly normal. There is no aphasia. Fund of knowledge is adequate. Mood and affect are appropriate  CRANIAL NERVES: Visual fields are full to confrontation. No fundus anomalies observed. Pupils are reactive to light and accommodation. Extraocular movements are intact and there is no nystagmus. Facial sensation is normal  Face is symmetrical.   Hearing is present. SCM/TPZ 5/5  Palate rises symmetrically. Tongue is in the midline. MOTOR:   The patient is 5/5 in all four limbs without any drift. CEREBELLAR: Mild postural and finger to nose to finger tremors, no head tremors. Very minimal difficulty with initiation of the Archimedes spiral at bedside    SENSORY:  Normal PP, vibration, propioception. Romberg neg    DTR's:   +2 throughout, no long tract signs     GAIT:   Normal gait        I have personally reviewed imaging studies.   I have read the report of the radiologist.  MRI of the brain was done on August 10 and it shows mild diffuse white matter changes with about perhaps 7-8 lesions scattered through both hemispheres, half of the MRI close to the deep nuclei, with patchy signal anomaly in the stephie that appears to be secondary to microangiopathy. There is very mild and age-appropriate generalized volume loss present. Impression: Best explanation for her headache is that she is having episodes of right-sided occipital neuralgia related to poor ergonomics at work. I explained that nothing on her MRI of the brain explains her headache presentation, the disease a cranial neuralgia and not a brain problem. However she does have a mild degree of cerebrovascular disease. The burden does not appear to be significant enough to explain her cognitive complaints well, but at 62 without any other risk factors for cerebrovascular disease,. I would say that the use of amphetamines in this situation is contraindicated. Furthermore she has a significant problem with insomnia related to a mood disorder and sleep deprivation that this causes alone can cause cognitive impairment and an amphetamine would only worsen this insomnia. I would favor an approach of no amphetamines and working on sleep schedules and sleep hygiene instead. Plan: 1-recommended to discuss with her psychiatrist weaning and eventual elimination of Adderall. 2-illustrated cervical exercises for her to do. Her problems are not too frequent, so I think at this point she does not need formal physical therapy or occipital nerve injections, but I did discuss those options are available should her headache not respond or worsen. 3-advised her about ergonomic modifications at work and to use her telephone headset. 4-counseled her about essential tremors and the potential evolution. At this point they are not functionally bothering her, so they do not need treatment at this point, but I encouraged her to return if they do worsen to consider treatment.   5-counseled her about reduction of risks for cerebrovascular disease. 6-Counseled pt about sleep hygiene, including predictable bedtimes and rise times, avoidance of late meals near bedtime, no TV in bedroom, to get out of room if unable to fall asleep after 10-15 mins, and no napping. 7-the patient will return as needed for further neurological needs. PLEASE NOTE:   Portions of this document may have been produced using voice recognition software. Unrecognized errors in transcription may be present. This note will not be viewable in [address]

## 2019-02-21 ENCOUNTER — HOSPITAL ENCOUNTER (OUTPATIENT)
Dept: MAMMOGRAPHY | Age: 64
Discharge: HOME | End: 2019-02-21
Attending: FAMILY MEDICINE
Payer: COMMERCIAL

## 2019-02-21 DIAGNOSIS — Z12.31 VISIT FOR SCREENING MAMMOGRAM: ICD-10-CM

## 2019-02-21 PROCEDURE — 77067 SCR MAMMO BI INCL CAD: CPT

## 2019-04-09 ENCOUNTER — OFFICE VISIT (OUTPATIENT)
Dept: ORTHOPEDIC SURGERY | Age: 64
End: 2019-04-09

## 2019-04-09 VITALS
BODY MASS INDEX: 24.94 KG/M2 | WEIGHT: 127 LBS | TEMPERATURE: 96.8 F | SYSTOLIC BLOOD PRESSURE: 140 MMHG | HEIGHT: 60 IN | OXYGEN SATURATION: 99 % | DIASTOLIC BLOOD PRESSURE: 75 MMHG | HEART RATE: 95 BPM

## 2019-04-09 DIAGNOSIS — S76.311A HAMSTRING STRAIN, RIGHT, INITIAL ENCOUNTER: ICD-10-CM

## 2019-04-09 DIAGNOSIS — S76.312A STRAIN OF LEFT HAMSTRING, INITIAL ENCOUNTER: Primary | ICD-10-CM

## 2019-04-09 RX ORDER — PREDNISONE 10 MG/1
TABLET ORAL
Qty: 12 TAB | Refills: 0 | Status: SHIPPED | OUTPATIENT
Start: 2019-04-09 | End: 2019-05-07 | Stop reason: DRUGHIGH

## 2019-04-09 NOTE — PROGRESS NOTES
HISTORY OF PRESENT ILLNESS    Taqueria Mustafa is a 66y.o. year old female comes in today as new patient for: leg pain thighs bilatral    Patients symptoms have been present for about 1 month. Pain level 5/10, It has slightly improved with time. Patient has tried:  Ibuprofen, mobic and heat. It is described as pain thighs posterior worse after camping. Past Surgical History:   Procedure Laterality Date    HX  SECTION  91 & 96    HX PELVIC LAPAROSCOPY      (+)endometriosis    HX TUBAL LIGATION      WI COLONOSCOPY FLX DX W/COLLJ SPEC WHEN PFRMD  2009    normal ; dr. Gio Baeza History     Socioeconomic History    Marital status:      Spouse name: Not on file    Number of children: Not on file    Years of education: Not on file    Highest education level: Not on file   Tobacco Use    Smoking status: Never Smoker    Smokeless tobacco: Never Used   Substance and Sexual Activity    Alcohol use: Yes     Comment: socially    Drug use: No      Current Outpatient Medications   Medication Sig Dispense Refill    FLUoxetine (PROZAC) 20 mg capsule TAKE 1 CAPSULE BY MOUTH ONCE DAILY  3    meloxicam (MOBIC) 15 mg tablet Take 1 Tab by mouth daily (with breakfast). 30 Tab 1    amphetamine-dextroamphetamine XR (ADDERALL XR) 30 mg XR capsule Take 30 mg by mouth every morning.  estradiol (VAGIFEM) 10 mcg Tab vaginal tablet Insert 10 mcg into vagina every Tuesday and Friday.  calcium carbonate-vitamin d2 500 mg(1,250mg) -200 unit Tab Take 1 Tab by mouth daily.  MULTIVITAMIN/IRON/FOLIC ACID (CENTRUM COMPLETE PO) Take  by mouth daily.        Past Medical History:   Diagnosis Date    ADD (attention deficit disorder) without hyperactivity     Arthritis     knees    Depression     Migraine     UTI (urinary tract infection)      Family History   Problem Relation Age of Onset    Hypertension Mother     Thyroid Disease Mother     Hypertension Father     Heart Attack Father  Other Father         circulation problems    Cancer Father     Colon Polyps Father          ROS:  No numb, tingle, swell, incont, fever, bruise. All other systems reviewed and negative aside from that written in the HPI. OBJECTIVE:  /75   Pulse 95   Temp 96.8 °F (36 °C) (Oral)   Ht 5' (1.524 m)   Wt 127 lb (57.6 kg)   SpO2 99%   BMI 24.80 kg/m²   GEN: Appears stated age in NAD. HEENT: Conjunctiva/lids WNL. External canals/nares WNL. Tongue midline. PERRL, EOMI. Hearing intact. NECK: Trachea midline. Supple, Full ROM. CARDIAC No peripheral edema. LUNGS: normal effort. ABD: Soft, NT.   MS: No clubbing/cyanosis. Steady gait. Strength +5/5 all extremities with full ROM and good tone. TTP distal bicep femoris bilateral worse with resisted knee flexion  NEURO: Sensation intact light touch. Good coordination. SKIN: Warm/dry w/o rash. PSYCH: A+O x3. Appropriate judgment and insight. Assessment/Plan:     ICD-10-CM ICD-9-CM    1. Strain of left hamstring, initial encounter S76.312A 843.8 predniSONE (DELTASONE) 10 mg tablet   2. Hamstring strain, right, initial encounter S76.311A 843.8 predniSONE (DELTASONE) 10 mg tablet       Patient (or guardian if minor) verbalizes understanding of evaluation and plan. Will start prednisone taper and stretch as demo and RTC 3-4 weeks.

## 2019-04-09 NOTE — PROGRESS NOTES
AVS reviewed: YES  HEP: AT reviewed  Resources Provided: YES Printed Photos  Patient questions/concerns answered: YES.  Further explained dx  Patient verbalized understanding of treatment plan: YES

## 2019-05-07 ENCOUNTER — OFFICE VISIT (OUTPATIENT)
Dept: ORTHOPEDIC SURGERY | Age: 64
End: 2019-05-07

## 2019-05-07 VITALS
DIASTOLIC BLOOD PRESSURE: 74 MMHG | SYSTOLIC BLOOD PRESSURE: 133 MMHG | WEIGHT: 127 LBS | HEART RATE: 85 BPM | HEIGHT: 60 IN | TEMPERATURE: 98 F | BODY MASS INDEX: 24.94 KG/M2 | OXYGEN SATURATION: 97 %

## 2019-05-07 DIAGNOSIS — S76.319D: Primary | ICD-10-CM

## 2019-05-07 RX ORDER — PREDNISONE 10 MG/1
TABLET ORAL
Qty: 30 TAB | Refills: 0 | Status: SHIPPED | OUTPATIENT
Start: 2019-05-07 | End: 2019-06-18 | Stop reason: ALTCHOICE

## 2019-05-07 NOTE — PROGRESS NOTES
HISTORY OF PRESENT ILLNESS    Ernst Setting is a 66y.o. year old female comes in today to be evaluated and treated for follow-up: hamstrings bilateral    Since last appt has noticed improvement with prednisone so short lived. Only doing HEP 1/day or so. Pain level 8/10. Using ibuprofen/mobic with some benefit. Past Surgical History:   Procedure Laterality Date    HX  SECTION  91 & 96    HX PELVIC LAPAROSCOPY      (+)endometriosis    HX TUBAL LIGATION      [place] COLONOSCOPY FLX DX W/COLLJ SPEC WHEN PFRMD  2009    normal ; dr. Poe Economy History     Socioeconomic History    Marital status:      Spouse name: Not on file    Number of children: Not on file    Years of education: Not on file    Highest education level: Not on file   Tobacco Use    Smoking status: Never Smoker    Smokeless tobacco: Never Used   Substance and Sexual Activity    Alcohol use: Yes     Comment: socially    Drug use: No     Current Outpatient Medications   Medication Sig Dispense Refill    FLUoxetine (PROZAC) 20 mg capsule TAKE 1 CAPSULE BY MOUTH ONCE DAILY  3    meloxicam (MOBIC) 15 mg tablet Take 1 Tab by mouth daily (with breakfast). 30 Tab 1    amphetamine-dextroamphetamine XR (ADDERALL XR) 30 mg XR capsule Take 30 mg by mouth every morning.  estradiol (VAGIFEM) 10 mcg Tab vaginal tablet Insert 10 mcg into vagina every Tuesday and Friday.  calcium carbonate-vitamin d2 500 mg(1,250mg) -200 unit Tab Take 1 Tab by mouth daily.  MULTIVITAMIN/IRON/FOLIC ACID (CENTRUM COMPLETE PO) Take  by mouth daily.  predniSONE (DELTASONE) 10 mg tablet 2 tabs daily for 4 days, then 1 tab daily until gone.  12 Tab 0     Past Medical History:   Diagnosis Date    ADD (attention deficit disorder) without hyperactivity     Arthritis     knees    Depression     Migraine     UTI (urinary tract infection)      Family History   Problem Relation Age of Onset    Hypertension Mother     Thyroid Disease Mother     Hypertension Father     Heart Attack Father     Other Father         circulation problems    Cancer Father     Colon Polyps Father          ROS:  No numb, tingle, swell, incont, fever, bruise. Objective:  /74   Pulse 85   Temp 98 °F (36.7 °C) (Oral)   Ht 5' (1.524 m)   Wt 127 lb (57.6 kg)   SpO2 97%   BMI 24.80 kg/m²   GEN: Appears stated age in NAD. HEENT: Conjunctiva/lids WNL. External canals/nares WNL. Tongue midline. PERRL, EOMI. Hearing intact. NECK: Trachea midline. Supple, Full ROM. CARDIAC No peripheral edema. LUNGS: normal effort. ABD: Soft, NT.   MS: No clubbing/cyanosis. Steady gait. Strength +5/5 all extremities with full ROM and good tone. not TTP distal bicep femoris but now mid-belly bilateral worse with resisted knee flexion  NEURO: Sensation intact light touch. Good coordination. SKIN: Warm/dry w/o rash. Assessment/Plan:     ICD-10-CM ICD-9-CM    1. Strain of hamstring, subsequent encounter S76.319D V58.89 predniSONE (DELTASONE) 10 mg tablet     843.8        Patient (or guardian if minor) verbalizes understanding of evaluation and plan. Discussed improved pain from distal now more mid-belly as quite flexible so will change stretch to ficus there and do pred taper a little higher dose and a little longer and RTC 4 weeks.

## 2019-05-07 NOTE — PROGRESS NOTES
AVS reviewed: YES  HEP: AT demonstrated  Resources Provided: NO Pt has printed stretches from before  Patient questions/concerns answered: NO. Pt denied questions/concerns  Patient verbalized understanding of treatment plan: YES

## 2019-06-18 ENCOUNTER — OFFICE VISIT (OUTPATIENT)
Dept: ORTHOPEDIC SURGERY | Age: 64
End: 2019-06-18

## 2019-06-18 VITALS
WEIGHT: 140 LBS | SYSTOLIC BLOOD PRESSURE: 117 MMHG | TEMPERATURE: 99.3 F | BODY MASS INDEX: 27.48 KG/M2 | RESPIRATION RATE: 15 BRPM | HEIGHT: 60 IN | DIASTOLIC BLOOD PRESSURE: 72 MMHG | HEART RATE: 97 BPM

## 2019-06-18 DIAGNOSIS — S76.319D: Primary | ICD-10-CM

## 2019-06-18 RX ORDER — BUPROPION HYDROCHLORIDE 150 MG/1
TABLET, EXTENDED RELEASE ORAL
COMMUNITY

## 2019-06-18 RX ORDER — IBUPROFEN 800 MG/1
800 TABLET ORAL
Qty: 90 TAB | Refills: 0 | Status: SHIPPED | OUTPATIENT
Start: 2019-06-18 | End: 2019-07-19 | Stop reason: SDUPTHER

## 2019-06-18 NOTE — PROGRESS NOTES
HISTORY OF PRESENT ILLNESS    Rajni Yost is a 66y.o. year old female comes in today to be evaluated and treated for: hamstring strain    Since last appt has noticed improvement with prednisone and changing stretches. Pain level 5/10. Very pleased with results. Past Surgical History:   Procedure Laterality Date    HX  SECTION  91 & 96    HX PELVIC LAPAROSCOPY      (+)endometriosis    HX TUBAL LIGATION      UT COLONOSCOPY FLX DX W/COLLJ SPEC WHEN PFRMD      normal ; dr. Turner Jobs History     Socioeconomic History    Marital status:      Spouse name: Not on file    Number of children: Not on file    Years of education: Not on file    Highest education level: Not on file   Tobacco Use    Smoking status: Never Smoker    Smokeless tobacco: Never Used   Substance and Sexual Activity    Alcohol use: Yes     Comment: socially    Drug use: No     Current Outpatient Medications   Medication Sig Dispense Refill    buPROPion SR (WELLBUTRIN SR) 150 mg SR tablet Take  by mouth. Take 1 to 2 tablets daily      meloxicam (MOBIC) 15 mg tablet Take 1 Tab by mouth daily (with breakfast). 30 Tab 1    estradiol (VAGIFEM) 10 mcg Tab vaginal tablet Insert 10 mcg into vagina every Tuesday and Friday.  calcium carbonate-vitamin d2 500 mg(1,250mg) -200 unit Tab Take 1 Tab by mouth daily.  MULTIVITAMIN/IRON/FOLIC ACID (CENTRUM COMPLETE PO) Take  by mouth daily.  FLUoxetine (PROZAC) 20 mg capsule TAKE 1 CAPSULE BY MOUTH ONCE DAILY  3    amphetamine-dextroamphetamine XR (ADDERALL XR) 30 mg XR capsule Take 30 mg by mouth every morning.          Past Medical History:   Diagnosis Date    ADD (attention deficit disorder) without hyperactivity     Arthritis     knees    Depression     Migraine     UTI (urinary tract infection)      Family History   Problem Relation Age of Onset    Hypertension Mother     Thyroid Disease Mother     Hypertension Father     Heart Attack Father     Other Father         circulation problems    Cancer Father     Colon Polyps Father          ROS:  No numb, tingle, swell    Objective:  /72   Pulse 97   Temp 99.3 °F (37.4 °C)   Resp 15   Ht 5' (1.524 m)   Wt 140 lb (63.5 kg)   BMI 27.34 kg/m²   GEN: Appears stated age in NAD. HEENT: Conjunctiva/lids WNL. External canals/nares WNL. Tongue midline. PERRL, EOMI. Hearing intact. NECK: Trachea midline. Supple, Full ROM. CARDIAC No peripheral edema. LUNGS: normal effort. ABD: Soft, NT.   MS: No clubbing/cyanosis. Steady gait. Strength +5/5 all extremities with full ROM and good tone.  no longer TTP  bicep femoris  NEURO: Sensation intact light touch. Good coordination. SKIN: Warm/dry w/o rash. Assessment/Plan:     ICD-10-CM ICD-9-CM    1. Strain of hamstring, subsequent encounter S76.319D V58.89 ibuprofen (MOTRIN) 800 mg tablet     843.8      Patient (or guardian if minor) verbalizes understanding of evaluation and plan. Will Rx ibuprofen for PRN and continue stretch as demo.  Return if needed and will likely refer PT.

## 2019-06-18 NOTE — PROGRESS NOTES
AVS reviewed: YES  HEP: Pt declined demo  Resources Provided: NO Pt already has stretches  Patient questions/concerns answered: YES. Pt informed that she does not need to do multiple of the same stretches.   Patient verbalized understanding of treatment plan: NO

## 2019-07-19 DIAGNOSIS — S76.319D: ICD-10-CM

## 2019-07-19 RX ORDER — IBUPROFEN 800 MG/1
800 TABLET ORAL
Qty: 90 TAB | Refills: 0 | Status: SHIPPED | OUTPATIENT
Start: 2019-07-19 | End: 2019-08-18

## 2020-06-16 ENCOUNTER — HOSPITAL ENCOUNTER (OUTPATIENT)
Dept: OCCUPATIONAL MEDICINE | Age: 65
Discharge: HOME | End: 2020-06-16
Attending: FAMILY MEDICINE

## 2020-06-16 ENCOUNTER — OFFICE VISIT (OUTPATIENT)
Dept: ORTHOPEDIC SURGERY | Age: 65
End: 2020-06-16

## 2020-06-16 VITALS
RESPIRATION RATE: 15 BRPM | DIASTOLIC BLOOD PRESSURE: 61 MMHG | BODY MASS INDEX: 27.68 KG/M2 | WEIGHT: 141 LBS | HEART RATE: 80 BPM | HEIGHT: 60 IN | SYSTOLIC BLOOD PRESSURE: 110 MMHG | TEMPERATURE: 96 F

## 2020-06-16 DIAGNOSIS — S76.319D: Primary | ICD-10-CM

## 2020-06-16 DIAGNOSIS — S76.319D: ICD-10-CM

## 2020-06-16 RX ORDER — IBUPROFEN 800 MG/1
800 TABLET ORAL
Qty: 90 TAB | Refills: 1 | Status: SHIPPED | OUTPATIENT
Start: 2020-06-16 | End: 2020-07-16

## 2020-06-16 RX ORDER — PREDNISONE 10 MG/1
TABLET ORAL
Qty: 42 TAB | Refills: 0 | Status: SHIPPED | OUTPATIENT
Start: 2020-06-16 | End: 2020-08-13 | Stop reason: ALTCHOICE

## 2020-06-16 NOTE — PROGRESS NOTES
AVS reviewed: YES  HEP: Pt declined demo. Pt has stretches from last appt  Resources Provided: YES YouTube Videos & PT referral  Patient questions/concerns answered: YES.  AT confirmed printed pictures from last appt include more stretches than YouTube videos  Patient verbalized understanding of treatment plan: YES

## 2020-06-16 NOTE — PATIENT INSTRUCTIONS
Follow-up on referral to physical therapy, perform stretches daily, use medication as prescribed, and return to the office in about 6 weeks. Search YouTube for my channel: 
 
Dr. Margy Coleman/Cooper

## 2020-06-16 NOTE — PROGRESS NOTES
HISTORY OF PRESENT ILLNESS    Silvano Harrison 1955 is a 60y.o. year old female comes in today to be evaluated and treated for: hamstring strain bilatreal    Since last appt has noticed minimal improvement after eval . Pain level 7/10. Using prednisoen and improved technique stretches prior with benefit but minimal stretch on own in many months and no PT. IMAGING: XR hips pending    Past Surgical History:   Procedure Laterality Date    HX  SECTION  91 & 96    HX PELVIC LAPAROSCOPY      (+)endometriosis    HX TUBAL LIGATION      CA COLONOSCOPY FLX DX W/COLLJ SPEC WHEN PFRMD      normal ; dr. Jarvis Gottron History     Socioeconomic History    Marital status:      Spouse name: Not on file    Number of children: Not on file    Years of education: Not on file    Highest education level: Not on file   Tobacco Use    Smoking status: Never Smoker    Smokeless tobacco: Never Used   Substance and Sexual Activity    Alcohol use: Yes     Comment: socially    Drug use: No     Current Outpatient Medications   Medication Sig Dispense Refill    estradiol (VAGIFEM) 10 mcg Tab vaginal tablet Insert 10 mcg into vagina every Tuesday and Friday.  calcium carbonate-vitamin d2 500 mg(1,250mg) -200 unit Tab Take 1 Tab by mouth daily.  MULTIVITAMIN/IRON/FOLIC ACID (CENTRUM COMPLETE PO) Take  by mouth daily.  buPROPion SR (WELLBUTRIN SR) 150 mg SR tablet Take  by mouth. Take 1 to 2 tablets daily      FLUoxetine (PROZAC) 20 mg capsule TAKE 1 CAPSULE BY MOUTH ONCE DAILY  3    meloxicam (MOBIC) 15 mg tablet Take 1 Tab by mouth daily (with breakfast). 30 Tab 1    amphetamine-dextroamphetamine XR (ADDERALL XR) 30 mg XR capsule Take 30 mg by mouth every morning.          Past Medical History:   Diagnosis Date    ADD (attention deficit disorder) without hyperactivity     Arthritis     knees    Depression     Migraine     UTI (urinary tract infection)      Family History   Problem Relation Age of Onset    Hypertension Mother     Thyroid Disease Mother     Hypertension Father     Heart Attack Father     Other Father         circulation problems    Cancer Father     Colon Polyps Father          ROS:  No numb, tingle, swell, incont, fever. Objective:  /61   Pulse 80   Temp (!) 96 °F (35.6 °C)   Resp 15   Ht 5' (1.524 m)   Wt 141 lb (64 kg)   BMI 27.54 kg/m²   GEN: Appears stated age in NAD. HEENT: Conjunctiva/lids WNL. External canals/nares WNL. Tongue midline. PERRL, EOMI. Hearing intact. NECK: Trachea midline. Supple, Full ROM. CARDIAC No peripheral edema. LUNGS: normal effort. ABD: Soft, NT.   MS: No clubbing/cyanosis. Steady gait. Strength +5/5 all extremities with full ROM and good tone.  minimal TTP bicep femoris B/L  NEURO: Sensation intact light touch. Good coordination. SKIN: Warm/dry w/o rash.     Assessment/Plan:     ICD-10-CM ICD-9-CM    1. Strain of hamstring, subsequent encounter S76.319D V58.89 REFERRAL TO PHYSICAL THERAPY     843.8 predniSONE (DELTASONE) 10 mg tablet       Patient (or guardian if minor) verbalizes understanding of evaluation and plan. Discussed need HEP/stretch as minimal effort 1 year and admits needs accountability. Will refer PT and demo stretch again and RTC 6 weeks.

## 2020-06-23 ENCOUNTER — HOSPITAL ENCOUNTER (OUTPATIENT)
Dept: PHYSICAL THERAPY | Age: 65
Discharge: HOME | End: 2020-06-23
Payer: COMMERCIAL

## 2020-06-23 PROCEDURE — 97530 THERAPEUTIC ACTIVITIES: CPT

## 2020-06-23 PROCEDURE — 97110 THERAPEUTIC EXERCISES: CPT

## 2020-06-23 PROCEDURE — 97161 PT EVAL LOW COMPLEX 20 MIN: CPT

## 2020-06-23 NOTE — PROGRESS NOTES
PT DAILY TREATMENT NOTE - [facility]     Patient Name: Prashanth Magallanes  Date:2020  : 1955  [x]  Patient  Verified  Payor: Gumaro Garcia / Plan: [place] [employer]  CAPITATED PT / Product Type: Commerical /    In time:330  Out time:415  Total Treatment Time (min): 45  Visit #: 1 of 8    Treatment Area: Strain of muscle, fascia and tendon of the posterior muscle group at thigh level, unspecified thigh, subsequent encounter [S76.412D]    SUBJECTIVE  Pain Level (0-10 scale): 5  Any medication changes, allergies to medications, adverse drug reactions, diagnosis change, or new procedure performed?: [x] No    [] Yes (see summary sheet for update)  Subjective functional status:   [x] See Eval form in paper chart      OBJECTIVE    15 min [x]Eval                  []Re-Eval       15 min Therapeutic Exercise:  [x] See flow sheet : squat/lift form   Rationale: increase ROM, increase strength, improve coordination, improve balance and increase proprioception to improve the patients ability to perform ADls. 15 min Therapeutic Activity:  [x]  See flow sheet :   Rationale: increase ROM, increase strength, improve coordination, improve balance and increase proprioception  to improve the patients ability to squat/lift. With   [] TE   [] TA   [] neuro   [] other: Patient Education: [x] Review HEP    [] Progressed/Changed HEP based on:   [] positioning   [] body mechanics   [] transfers   [] heat/ice application    [] other:                  Pain Level (0-10 scale) post treatment: 5    ASSESSMENT:   [x]  See Evaluation         Goals:  Short Term Goals: To be [id number] in 1 weeks:  1. Therapist to establish HEP for strength and ROM to improve ease with ADLs & gait. Long Term Goals: To be accomplished in 4 weeks:  1. Patient will be independent with HEP to improve carryover of functional gains with ADLs between visits. Eval Status:n/a  2.  Pt will walk 20 min without pain to improve household and community ambulation. Jolena Boehringer Status: pain after 5-10 min  3. Pt will increase FOTO score to set d/c points to demonstrate improved functional mobility. Eval Status: to be assessed next visit due to time constraint  4. Pt will increase B hip flexion/abduction/abduction to 5/5 with MMT to improve ease with gait & safety with ambulation.    Eval Status:   B hip flexion: 4/5  B hip abduction: 3/5  B hip extension: 3/5      PLAN      [x]  Continue plan of care    []  Other:_      Beverlee Gowers, PT 6/23/2020  4:34 PM

## 2020-06-23 NOTE — PROGRESS NOTES
[facility] [address]  [address], [address] [phone] fax    Plan of Care/ Statement of Necessity for Physical Therapy Services           Patient name: Tucker Nascimento Start of Care: 2020   Referral source: Junaid Chester DO : 1955    Medical Diagnosis: Strain of muscle, fascia and tendon of the posterior muscle group at thigh level, unspecified thigh, subsequent encounter [W07.220W]  Payor: Henrietta Giles / Plan: [place] [employer]  CAPITATED PT / Product Type: Commerical /  Onset Date:20    Treatment Diagnosis: B hamstring pain/weakness   Prior Hospitalization: see medical history Provider#: [id number]   Medications: Verified on Patient summary List    Comorbidities: depression   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 Assessment/ key information: Patient is a 60 y. o.female presenting with Strain of muscle, fascia and tendon of the posterior muscle group at thigh level, unspecified thigh, subsequent encounter [E97.214C]. Ms. Charlotte Dunne presents to initial PT evaluation with c/o B HS pain worsening over the past 16 months while in a loaded flexed position during intercourse. Lumbar screen was negative for referred symptoms. Noted HS and glut weakness. flexibility restrictions present bilaterally. Altered squat form likely contributing to symptoms. Discussed proper footwear at length as patient has hypermobility of B midtarsal joints as well leading to some knee valgum in standing. Symptoms consistent with chronic B HS strain. Patient will benefit from skilled PT services to address deficits and facilitate return to premorbid activity level and promote improved quality of life.        Evaluation Complexity History MEDIUM  Complexity : 1-2 comorbidities / personal factors will impact the outcome/ POC ; Examination LOW Complexity : 1-2 Standardized tests and measures addressing body structure, function, activity limitation and / or participation in recreation  ;Presentation LOW Complexity : Stable, uncomplicated  ;Clinical Decision Making MEDIUM Complexity : FOTO score of 26-74  Overall Complexity Rating: LOW   Problem List: pain affecting function, decrease ROM, decrease strength, edema affecting function, impaired gait/ balance, decrease ADL/ functional abilitiies, decrease activity tolerance, decrease flexibility/ joint mobility and decrease transfer abilities   Treatment Plan may include any combination of the following: Therapeutic exercise, Therapeutic activities, Neuromuscular re-education, Physical agent/modality, Gait/balance training, Manual therapy, Aquatic therapy, Patient education, Self Care training, Functional mobility training, Home safety training and Stair training  Patient / Family readiness to learn indicated by: asking questions, trying to perform skills and interest  Persons(s) to be included in education: patient (P)  Barriers to Learning/Limitations: None  Patient Goal (s): be able to walk and stand painfree.   Patient Self Reported Health Status: good  Rehabilitation Potential: good  Short Term Goals: To be [id number] in 1 weeks:  1. Therapist to establish HEP for strength and ROM to improve ease with ADLs & gait. Long Term Goals: To be accomplished in 4 weeks:  1. Patient will be independent with HEP to improve carryover of functional gains with ADLs between visits. Eval Status:n/a  2. Pt will walk 20 min without pain to improve household and community ambulation. Obion Ansted Status: pain after 5-10 min  3. Pt will increase FOTO score to set d/c points to demonstrate improved functional mobility. Eval Status: to be assessed next visit due to time constraint  4. Pt will increase B hip flexion/abduction/abduction to 5/5 with MMT to improve ease with gait & safety with ambulation.    Eval Status:   B hip flexion: 4/5  B hip abduction: 3/5  B hip extension: 3/5    Frequency / Duration: Patient to be seen 2 times per week for 4 weeks. Patient/ Caregiver education and instruction: Diagnosis, prognosis, self care, activity modification and exercises   [x]  Plan of care has been reviewed with HAYLEE Mckee, PT 6/23/2020 3:35 PM    ________________________________________________________________________    I certify that the above Therapy Services are being furnished while the patient is under my care. I agree with the treatment plan and certify that this therapy is necessary.     Physician's Signature:____________Date:_________TIME:________    ** Signature, Date and Time must be completed for valid certification **    Please sign and return to [facility] [address]  [address] [facility] [address] [facility] [address], [address] [phone] fax

## 2020-07-06 ENCOUNTER — HOSPITAL ENCOUNTER (OUTPATIENT)
Dept: PHYSICAL THERAPY | Age: 65
Discharge: HOME | End: 2020-07-06
Payer: COMMERCIAL

## 2020-07-06 PROCEDURE — 97112 NEUROMUSCULAR REEDUCATION: CPT

## 2020-07-06 PROCEDURE — 97110 THERAPEUTIC EXERCISES: CPT

## 2020-07-06 NOTE — PROGRESS NOTES
PT DAILY TREATMENT NOTE 10-18    Patient Name: Betty Sanchez  Date:2020  : 1955  [x]  Patient  Verified  Payor: Vickey Madrigal / Plan: [place] [employer]  CAPITATED PT / Product Type: Commerical /    In time:5:00  Out time:5:45  Total Treatment Time (min): 45  Visit #: 2 of 8    Medicare/BCBS Only   Total Timed Codes (min):   1:1 Treatment Time:         Treatment Area: Strain of muscle, fascia and tendon of the posterior muscle group at thigh level, unspecified thigh, subsequent encounter [S76.319D]    SUBJECTIVE  Pain Level (0-10 scale): 4  Any medication changes, allergies to medications, adverse drug reactions, diagnosis change, or new procedure performed?: [x] No    [] Yes (see summary sheet for update)  Subjective functional status/changes:   [] No changes reported  \"I have been doing the exercises I got at my evaluatiion, they have been helping. \"    OBJECTIVE    35 min Therapeutic Exercise:  [x] See flow sheet :   Rationale: increase ROM and increase strength to improve the patients ability to perform gait and transfers with improved LE strength and mobility. 10 min Neuromuscular Re-education:  [x]  See flow sheet :   Rationale: increase strength, improve coordination, improve balance and increase proprioception  to improve the patients ability to perform stair negotiation and functional mobility in the home/community with improved quadriceps control and decreased falls risk.             With   [] TE   [] TA   [] neuro   [] other: Patient Education: [x] Review HEP    [] Progressed/Changed HEP based on:   [] positioning   [] body mechanics   [] transfers   [] heat/ice application    [] other:      Other Objective/Functional Measures:   -Initiated treatment per flowsheet     Pain Level (0-10 scale) post treatment: 0    ASSESSMENT/Changes in Function: Patient requires verbal and visual cuing for all newly introduced interventions during today's session; she requires additional tactile cuing for correct technique during hip abduction to avoid TFL/hip flexor compensation. Patient will continue to benefit from skilled PT services to modify and progress therapeutic interventions, address functional mobility deficits, address ROM deficits, address strength deficits, analyze and address soft tissue restrictions, analyze and cue movement patterns, analyze and modify body mechanics/ergonomics, assess and modify postural abnormalities and address imbalance/dizziness to attain remaining goals. []  See Plan of Care  []  See progress note/recertification  []  See Discharge Summary         Progress towards goals / Updated goals:  Short Term Goals: To be [id number] in 1 weeks:  1. Therapist to establish HEP for strength and ROM to improve ease with ADLs & gait. Current: met, pt reports compliance         Long Term Goals: To be accomplished in 4 weeks:  1. Patient will be independent with HEP to improve carryover of functional gains with ADLs between visits. Eval Status:n/a  2. Pt will walk 20 min without pain to improve household and community ambulation. Anna Ivans Status: pain after 5-10 min  3. Pt will increase FOTO score to set d/c points to demonstrate improved functional mobility. Eval Status: to be assessed next visit due to time constraint  4. Pt will increase B hip flexion/abduction/abduction to 5/5 with MMT to improve ease with gait & safety with ambulation.    Eval Status:   B hip flexion: 4/5  B hip abduction: 3/5  B hip extension: 3/5    PLAN  [x]  Upgrade activities as tolerated     [x]  Continue plan of care  []  Update interventions per flow sheet       []  Discharge due to:_  []  Other:_      Lawence Pack 7/6/2020  9:25 AM    Future Appointments   Date Time Provider Leanne Martin   7/6/2020  5:00 PM Emanuel Blocker [facility]   7/28/2020  3:40 PM [place], [address]   8/11/2020  2:45 PM HBV KOBY RM 3 3D [facility]   8/11/2020  3:00 PM HBV BONE DEXA RM 1 [facility]

## 2020-07-10 ENCOUNTER — HOSPITAL ENCOUNTER (OUTPATIENT)
Dept: PHYSICAL THERAPY | Age: 65
Discharge: HOME | End: 2020-07-10
Payer: COMMERCIAL

## 2020-07-10 PROCEDURE — 97110 THERAPEUTIC EXERCISES: CPT

## 2020-07-10 NOTE — PROGRESS NOTES
PT DAILY TREATMENT NOTE 10-18    Patient Name: Virginia Chu  Date:7/10/2020  : 1955  [x]  Patient  Verified  Payor: Nino Lewis / [place] [place] [facility] Product Type: Commerical /    In time: 4:19  Out time: 5:00  Total Treatment Time (min): 41  Visit #: 3 of 8    Treatment Area: Strain of muscle, fascia and tendon of the posterior muscle group at thigh level, unspecified thigh, subsequent encounter [S76.867D]    SUBJECTIVE  Pain Level (0-10 scale):  5/10  Any medication changes, allergies to medications, adverse drug reactions, diagnosis change, or new procedure performed?: [x] No    [] Yes (see summary sheet for update)  Subjective functional status/changes:   [] No changes reported  Pt. Reports she is feeling ok today. She was a little sore after last visit. Reports pain occurs with standing and walking    OBJECTIVE    41 min Therapeutic Exercise:  [x] See flow sheet :   Rationale: increase ROM and increase strength to improve the patients ability to increase ease of ambulation          With   [x] TE   [] TA   [] neuro   [] other: Patient Education: [x] Review HEP    [] Progressed/Changed HEP based on:   [] positioning   [] body mechanics   [] transfers   [] heat/ice application    [] other:      Other Objective/Functional Measures:   Improved form with hip abduction today on left but required cues on right  Tolerated 5# resistance well during hamstring eccentrics  FOTO: 50 points  RDL with 10# reproduced her symptoms    Pain Level (0-10 scale) post treatment: 0/10    ASSESSMENT/Changes in Function:  Pt. Is progressing slowly towards goals. She continues to have decreased ambulation and standing tolerance secondary to B posterior leg pain. She tolerated increased resistance during eccentric hamstring exercise today.      Patient will continue to benefit from skilled PT services to modify and progress therapeutic interventions, address functional mobility deficits, address ROM deficits, address strength deficits, analyze and address soft tissue restrictions, analyze and cue movement patterns, analyze and modify body mechanics/ergonomics and assess and modify postural abnormalities to attain remaining goals. Progress towards goals / Updated goals:  Short Term Goals: To be [id number] in 1 weeks:  1. Therapist to establish HEP for strength and ROM to improve ease with ADLs & gait. Current: met, pt reports compliance         Long Term Goals: To be accomplished in 4 weeks:  1. Patient will be independent with HEP to improve carryover of functional gains with ADLs between visits.             Eval Status:n/a  2. Pt will walk 20 min without pain to improve household and community ambulation. .               Eval Status: pain after 5-10 min  3. Pt will increase FOTO score to set d/c points to demonstrate improved functional mobility.             Eval Status: to be assessed next visit due to time constraint  4.  Pt will increase B hip flexion/abduction/abduction to 5/5 with MMT to improve ease with gait & safety with ambulation.    Eval Status:   B hip flexion: 4/5  B hip abduction: 3/5  B hip extension: 3/5       PLAN  []  Upgrade activities as tolerated     [x]  Continue plan of care  []  Update interventions per flow sheet       []  Discharge due to:_  []  Other:_      Keegan Mello, PT 7/10/2020  7:52 AM    Future Appointments   Date Time Provider Leanne Martin   7/10/2020  4:15 PM Jevon Hugo, RAMIREZ [facility]   7/13/2020  5:00 PM Heather Huang E[facility]OPJMQ [facility]   7/16/2020  4:15 PM Heather Huang [facility]   7/28/2020  3:40 PM Rigo, [address]   8/11/2020  2:45 PM HBV KOBY RM 3 3D HBVRMAM [facility]   8/11/2020  3:00 PM HBV BONE DEXA RM 1 HBVRBD [facility]

## 2020-07-16 ENCOUNTER — HOSPITAL ENCOUNTER (OUTPATIENT)
Dept: PHYSICAL THERAPY | Age: 65
Discharge: HOME | End: 2020-07-16
Payer: COMMERCIAL

## 2020-07-16 PROCEDURE — 97110 THERAPEUTIC EXERCISES: CPT

## 2020-07-16 PROCEDURE — 97112 NEUROMUSCULAR REEDUCATION: CPT

## 2020-07-16 NOTE — PROGRESS NOTES
PT DAILY TREATMENT NOTE 10-18    Patient Name: Betty Sanchez  Date:2020  : 1955  [x]  Patient  Verified  Payor: Vickey Madrigal / Plan: [facility] [employer]  [facility] PT / Product Type: Commerical /    In time:4:23  Out time:5:16  Total Treatment Time (min): 53  Visit #: 5 of 10    Medicare/BCBS Only   Total Timed Codes (min):   1:1 Treatment Time:         Treatment Area: Strain of muscle, fascia and tendon of the posterior muscle group at thigh level, unspecified thigh, subsequent encounter [S76.319D]    SUBJECTIVE  Pain Level (0-10 scale): 4  Any medication changes, allergies to medications, adverse drug reactions, diagnosis change, or new procedure performed?: [x] No    [] Yes (see summary sheet for update)  Subjective functional status/changes:   [] No changes reported  \"I feel like I am able to walk around a little more with less pain. \"    OBJECTIVE    40 min Therapeutic Exercise:  [x] See flow sheet :   Rationale: increase ROM and increase strength to improve the patients ability to perform gait and transfers with improved LE strength and mobility. 13 min Neuromuscular Re-education:  [x]  See flow sheet :   Rationale: increase strength, improve coordination, improve balance and increase proprioception  to improve the patients ability to perform stair negotiation and functional mobility in the home/community with improved quadriceps control and decreased falls risk.             With   [x] TE   [] TA   [] neuro   [] other: Patient Education: [x] Review HEP    [] Progressed/Changed HEP based on:   [] positioning   [] body mechanics   [] transfers   [] heat/ice application    [] other:      Other Objective/Functional Measures: -Added lateral walk with band and hip x 3 for increased hip stability/strengthening  -Discussed crossfriction massage to hamstring origin     Pain Level (0-10 scale) post treatment: 3    ASSESSMENT/Changes in Function: Patient continues to report increased hamstring origin pain with extended standing/ambulation; her progress toward decreased pain with extended ambulation has been minimal to this point but she reports she continues to see small improvements in her strength between sessions. Decreased cuing required to initiate proper squat technique during today's session. Patient will continue to benefit from skilled PT services to modify and progress therapeutic interventions, address functional mobility deficits, address ROM deficits, address strength deficits, analyze and address soft tissue restrictions, analyze and cue movement patterns, analyze and modify body mechanics/ergonomics, assess and modify postural abnormalities and address imbalance/dizziness to attain remaining goals. []  See Plan of Care  []  See progress note/recertification  []  See Discharge Summary         Progress towards goals / Updated goals:  Short Term Goals: To be [id number] in 1 weeks:  1. Therapist to establish HEP for strength and ROM to improve ease with ADLs & gait. Current: met, pt reports compliance         Long Term Goals: To be accomplished in 4 weeks:  1. Patient will be independent with HEP to improve carryover of functional gains with ADLs between visits.             Eval Status:n/a  2. Pt will walk 20 min without pain to improve household and community ambulation. .               Eval Status: pain after 5-10 min   Current: not met, feels pain anytime she is walking  3. Pt will increase FOTO score to set d/c points to demonstrate improved functional mobility.             Eval Status: FOTO 50 pts              Current: reassess at MD note  4.  Pt will increase B hip flexion/abduction/abduction to 5/5 with MMT to improve ease with gait & safety with ambulation.    Eval Status:   B hip flexion: 4/5  B hip abduction: 3/5  B hip extension: 3/5    Current: reassess at MD note    PLAN  [x]  Upgrade activities as tolerated     [x]  Continue plan of care  []  Update interventions per flow sheet []  Discharge due to:_  []  Other:_      Scot Barnes 7/16/2020  9:24 AM    Future Appointments   Date Time Provider Leanne Veronica   7/16/2020  4:15 PM Anna Marie Souza [facility]   7/20/2020 10:15 AM Liv Mcmullen, PT [facility]   7/22/2020  5:00 PM Anna Marie Souza [id number] [facility]   7/28/2020  3:40 PM Rigo, [address]   8/11/2020  2:45 PM HBV KOBY RM 3 3D HBVRMAM [facility]   8/11/2020  3:00 PM HBV BONE DEXA RM 1 HBVRBD [facility]

## 2020-07-20 ENCOUNTER — HOSPITAL ENCOUNTER (OUTPATIENT)
Dept: PHYSICAL THERAPY | Age: 65
Discharge: HOME | End: 2020-07-20
Payer: COMMERCIAL

## 2020-07-20 PROCEDURE — 97140 MANUAL THERAPY 1/> REGIONS: CPT

## 2020-07-20 PROCEDURE — 97110 THERAPEUTIC EXERCISES: CPT

## 2020-07-20 NOTE — PROGRESS NOTES
PT DAILY TREATMENT NOTE 10-18    Patient Name: Paxton Schwartz  Date:2020  : 1955  [x]  Patient  Verified  Payor: Cami Woodward / Plan: [place] [employer]  [facility]IT[facility] PT / Product Type: Commerical /    In time: 10:22  Out time: 11:14  Total Treatment Time (min): 52  Visit #: 6 of 10    Treatment Area: Strain of muscle, fascia and tendon of the posterior muscle group at thigh level, unspecified thigh, subsequent encounter [U26.254L]    SUBJECTIVE  Pain Level (0-10 scale):  4/10  Any medication changes, allergies to medications, adverse drug reactions, diagnosis change, or new procedure performed?: [x] No    [] Yes (see summary sheet for update)  Subjective functional status/changes:   [] No changes reported  Pt. Reports she is feeling about the same. She does reports walking the other day with less pain.      OBJECTIVE    Modality rationale: decrease pain and increase tissue extensibility to improve the patients ability to increase ease of ADLs   Min Type Additional Details    [] Estim:  []Unatt       []IFC  []Premod                        []Other:  []w/ice   []w/heat  Position:  Location:    [] Estim: []Att    []TENS instruct  []NMES                    []Other:  []w/US   []w/ice   []w/heat  Position:  Location:    []  Traction: [] Cervical       []Lumbar                       [] Prone          []Supine                       []Intermittent   []Continuous Lbs:  [] before manual  [] after manual    []  Ultrasound: []Continuous   [] Pulsed                           []1MHz   []3MHz W/cm2:  Location:    []  Iontophoresis with dexamethasone         Location: [] Take home patch   [] In clinic   10 []  Ice     [x]  heat  []  Ice massage  []  Laser   []  Anodyne Position: prone  Location: B hamstring    []  Laser with stim  []  Other:  Position:  Location:    []  Vasopneumatic Device Pressure:       [] lo [] med [] hi   Temperature: [] lo [] med [] hi   [x] Skin assessment post-treatment:  [x]intact []redness- no adverse reaction    []redness  adverse reaction:     32 min Therapeutic Exercise:  [x] See flow sheet :   Rationale: increase ROM and increase strength to improve the patients ability to increase ease of ADLs    10 min Manual Therapy:  Trigger point release and cross friction massage at B hamstring origin   Rationale: decrease pain, increase ROM and increase tissue extensibility to increase ease of ADLs          With   [x] TE   [] TA   [] neuro   [] other: Patient Education: [x] Review HEP    [] Progressed/Changed HEP based on:   [] positioning   [] body mechanics   [] transfers   [] heat/ice application    [] other:      Other Objective/Functional Measures:   Ambulation tolerance: reports she walked without pain the other day but not consistently   She had some improvement in pain following manual. She was educated on self massage with ball but did have increased pain when attempting. Reports pain with bridges so this was held today  She was challenged with mini nordic HS curls today      Pain Level (0-10 scale) post treatment:  3/10    ASSESSMENT/Changes in Function:  Pt. Is making limited progress towards goals. She continues to have pain with ambulation but does reports occasional improvement. She continues to have decreased B hip strength. She continues to require cues to perform exercises correctly. Patient will continue to benefit from skilled PT services to modify and progress therapeutic interventions, address functional mobility deficits, address ROM deficits, address strength deficits, analyze and address soft tissue restrictions, analyze and cue movement patterns, analyze and modify body mechanics/ergonomics and assess and modify postural abnormalities to attain remaining goals. Progress towards goals / Updated goals:  Short Term Goals: To be [id number] in 1 weeks:  1. Therapist to establish HEP for strength and ROM to improve ease with ADLs & gait.   Current: met, pt reports compliance         Long Term Goals: To be accomplished in 4 weeks:  1. Patient will be independent with HEP to improve carryover of functional gains with ADLs between visits.             Eval Status:n/a  2. Pt will walk 20 min without pain to improve household and community ambulation. .               Eval Status: pain after 5-10 min              Current: not met: she reports less pain with walking the other day but overall continues to have pain with walking short distances (7/20/20)  3. Pt will increase FOTO score to set d/c points to demonstrate improved functional mobility.             Eval Status: FOTO 50 pts              Current: reassess at MD note  4.  Pt will increase B hip flexion/abduction/abduction to 5/5 with MMT to improve ease with gait & safety with ambulation.    Eval Status:   B hip flexion: 4/5  B hip abduction: 3/5  B hip extension: 3/5               Current: reassess at MD note    PLAN  []  Upgrade activities as tolerated     [x]  Continue plan of care  []  Update interventions per flow sheet       []  Discharge due to:_  []  Other:_      Shira Rust PT 7/20/2020  7:40 AM    Future Appointments   Date Time Provider Leanne Martin   7/20/2020 10:15 AM Christian Monaco PT [id number] [facility]   7/22/2020  5:00 PM Ajay Krishnan [id number] [facility]   7/28/2020  3:40 PM Rigo, [address]   8/11/2020  2:45 PM HBV KOBY RM 3 3D HBVRMAM [facility]   8/11/2020  3:00 PM HBV BONE DEXA RM 1 HBVRBD [facility]

## 2020-07-22 ENCOUNTER — HOSPITAL ENCOUNTER (OUTPATIENT)
Dept: PHYSICAL THERAPY | Age: 65
Discharge: HOME | End: 2020-07-22
Payer: COMMERCIAL

## 2020-07-22 PROCEDURE — 97530 THERAPEUTIC ACTIVITIES: CPT

## 2020-07-22 PROCEDURE — 97110 THERAPEUTIC EXERCISES: CPT

## 2020-07-22 PROCEDURE — 97140 MANUAL THERAPY 1/> REGIONS: CPT

## 2020-07-22 NOTE — PROGRESS NOTES
PT DAILY TREATMENT NOTE 10-18    Patient Name: Destini Reading  Date:2020  : 1955  [x]  Patient  Verified  Payor: Frankie Kim / Plan: [facility] [employer]  [employer] / Product Type: Commerical /    In time:5:02  Out time:5:57  Total Treatment Time (min): 55  Visit #: 7 of 8    Medicare/BCBS Only   Total Timed Codes (min):   1:1 Treatment Time:         Treatment Area: Strain of muscle, fascia and tendon of the posterior muscle group at thigh level, unspecified thigh, subsequent encounter [S76.332D]    SUBJECTIVE  Pain Level (0-10 scale): 2-3  Any medication changes, allergies to medications, adverse drug reactions, diagnosis change, or new procedure performed?: [x] No    [] Yes (see summary sheet for update)  Subjective functional status/changes:   [] No changes reported  \"I was able to walk today after work without any leg pain. \"    OBJECTIVE    Modality rationale: decrease pain to improve the patients ability to relax and sleep following therapy session to improve restorative sleep patterns.     Min Type Additional Details    [x] Estim:  []Unatt       []IFC  []Premod                        []Other:  []w/ice   []w/heat  Position:   Location:     [] Estim: []Att    []TENS instruct  []NMES                    []Other:  []w/US   []w/ice   []w/heat  Position:  Location:    []  Traction: [] Cervical       []Lumbar                       [] Prone          []Supine                       []Intermittent   []Continuous Lbs:  [] before manual  [] after manual    []  Ultrasound: []Continuous   [] Pulsed                           []1MHz   []3MHz W/cm2:  Location:    []  Iontophoresis with dexamethasone         Location: [] Take home patch   [] In clinic   5 []  Ice     [x]  heat  []  Ice massage  []  Laser   []  Anodyne Position: prone  Location: bilateral posterior thighs    []  Laser with stim  []  Other:  Position:  Location:    []  Vasopneumatic Device Pressure:       [] lo [] med [] hi   Temperature: [] lo [] med [] hi [x] Skin assessment post-treatment:  [x]intact []redness- no adverse reaction    []redness  adverse reaction:       20 min Therapeutic Exercise:  [x] See flow sheet :   Rationale: increase ROM and increase strength to improve the patients ability to perform gait and transfers with improved LE strength and mobility. 20 min Therapeutic Activity:  [x]  See flow sheet :   Patient education: FOTO, goals, progress with therapy thus far     10 min Manual Therapy:  Bilateral crossfriction massage to hamstrings, hamstring origin   Rationale: decrease pain, increase ROM, increase tissue extensibility and decrease trigger points to improve ease of ADLs after therapy session. With   [] TE   [] TA   [] neuro   [] other: Patient Education: [x] Review HEP    [] Progressed/Changed HEP based on:   [] positioning   [] body mechanics   [] transfers   [] heat/ice application    [] other:      Other Objective/Functional Measures:   Functional Gains: walking is easier (sometimes less painful), standing for longer periods without increased pain, no pain at rest  Functional Deficits: walking is sometimes painful/difficult, even over shorter distances  % improvement: 40%  Pain   Average: 2-3/10       Best: 0/10     Worst: 6/10  Patient Goal: \"no more pain when I am walking\"       Pain Level (0-10 scale) post treatment: 1    ASSESSMENT/Changes in Function: Patient has attended therapy consistently for 7 sessions for the treatment of bilateral posterior thigh pain consistent with hamstring streain. At this time, the patient has made improvements in her hip strength bilaterally, her perceived pain with ambulation, and her ambulation tolerance over short distances without pain. However, the patient presents with continued functional deficits with intermittent, rather than constant pain, with ambulation and continued restricted standing tolerance.  The patient will benefit from continued skilled outpatient therapy to address these remaining functional deficits and decreased her pain. Patient will continue to benefit from skilled PT services to modify and progress therapeutic interventions, address functional mobility deficits, address ROM deficits, address strength deficits, analyze and address soft tissue restrictions, analyze and cue movement patterns, analyze and modify body mechanics/ergonomics, assess and modify postural abnormalities and address imbalance/dizziness to attain remaining goals. []  See Plan of Care  []  See progress note/recertification  []  See Discharge Summary         Progress towards goals / Updated goals:  Short Term Goals: To be [id number] in 1 weeks:  1. Therapist to establish HEP for strength and ROM to improve ease with ADLs & gait. Current: met, pt reports compliance         Long Term Goals: To be accomplished in 4 weeks:  1. Patient will be independent with HEP to improve carryover of functional gains with ADLs between visits.             Eval Status:n/a   Current: met, pt reports compliance  2. Pt will walk 20 min without pain to improve household and community ambulation. .               Eval Status: pain after 5-10 min              Current: not met: she reports less pain with walking the other day but overall continues to have pain with walking short distances (7/20/20)  3. Pt will increase FOTO score to set d/c points to demonstrate improved functional mobility.             Eval Status: FOTO 50 pts              Current: progressing, 60 pts  4.  Pt will increase B hip flexion/abduction/abduction to 5/5 with MMT to improve ease with gait & safety with ambulation.    Eval Status:   B hip flexion: 4/5  B hip abduction: 3/5  B hip extension: 3/5               Current: progressing, B hip flexion 5/5, B hip abduction 4/5, B hip extension 4/5    PLAN  [x]  Upgrade activities as tolerated     [x]  Continue plan of care  []  Update interventions per flow sheet       []  Discharge due to:_  [] Other:_      Misael Navarro 7/22/2020  9:27 AM    Future Appointments   Date Time Provider Leanne Veronica   7/22/2020  5:00 PM Osei Wilson [facility]   7/29/2020  5:00 PM Nikolasjose Wilson [facility]   7/31/2020  4:15 PM Morales Conde, PT [facility]   8/4/2020  2:40 PM Rigo, [address]   8/11/2020  2:45 PM HBV KOBY RM 3 3D HBVRMAM [facility]   8/11/2020  3:00 PM HBV BONE DEXA RM 1 HBVRBD [facility]

## 2020-07-23 NOTE — PROGRESS NOTES
[facility] Radharubia Flowers  [address] [facility]  [phone] fax    Physical Therapy Progress Note  Patient name: Allyssa Nelson Start of Care: 20   Referral source: Remedioszaina ChairezDO lucius : 1955   Medical/Treatment Diagnosis: Strain of muscle, fascia and tendon of the posterior muscle group at thigh level, unspecified thigh, subsequent encounter [R31.802G]  Payor: OPTIMA / Plan: [address] PT / Product Type: Commerical /  Onset Date:20     Prior Hospitalization: see medical history Provider#: [id number]   Medications: Verified on Patient Summary List     Comorbidities: depression   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 Therapist to establish HEP for strength and ROM to improve ease with ADLs & gait. Current: met, pt reports compliance         Long Term Goals: To be accomplished in 4 weeks:  1. Patient will be independent with HEP to improve carryover of functional gains with ADLs between visits.             Eval Status:n/a              Current: met, pt reports compliance  2. Pt will walk 20 min without pain to improve household and community ambulation. .               Eval Status: pain after 5-10 min              Current: not met: she reports less pain with walking the other day but overall continues to have pain with walking short distances (20)  3. Pt will increase FOTO score to set d/c points to demonstrate improved functional mobility.             Eval Status: FOTO 50 pts              Current: progressing, 60 pts  4.  Pt will increase B hip flexion/abduction/abduction to 5/5 with MMT to improve ease with gait & safety with ambulation.    Eval Status:   B hip flexion: 4/5  B hip abduction: 3/5  B hip extension: 3/5               Current: progressing, B hip flexion 5/5, B hip abduction 4/5, B hip extension 4/5    Key Functional Changes:   Functional Gains: walking is easier (sometimes less painful), standing for longer periods without increased pain, no pain at rest  Functional Deficits: walking is sometimes painful/difficult, even over shorter distances  % improvement: 40%  Pain   Average: 2-3/10                  Best: 0/10                Worst: 6/10  Patient Goal: \"no more pain when I am walking\"    Updated Goals: to be achieved in 4 weeks:  1. Pt will walk 20 min without pain to improve household and community ambulation. .               Recert: she reports less pain with walking the other day but overall continues to have pain with walking short distances   2. Pt will increase FOTO score to set d/c points to demonstrate improved functional mobility.             Recert:  FOTO 60 pts  3.  Pt will increase abduction/extension to 5/5 with MMT to improve ease with gait & safety with ambulation. Recert:  B hip abduction 4/5, B hip extension 4/5    ASSESSMENT:  Patient has attended therapy consistently for 7 sessions for the treatment of bilateral posterior thigh pain consistent with hamstring streain. At this time, the patient has made improvements in her hip strength bilaterally, her perceived pain with ambulation, and her ambulation tolerance over short distances without pain. However, the patient presents with continued functional deficits with intermittent, rather than constant pain, with ambulation and continued restricted standing tolerance.  The patient will benefit from continued skilled outpatient therapy to address these remaining functional deficits and decreased her pain.        RECOMMENDATIONS:  [x]Continue therapy per initial plan/protocol at a frequency of  2 x per week for 5 weeks  []Continue therapy with the following recommended changes:_____________________      _____________________________________________________________________  []Discontinue therapy progressing towards or have reached established goals  []Discontinue therapy due to lack of appreciable progress towards goals  []Discontinue therapy due to lack of attendance or compliance  []Await Physician's recommendations/decisions regarding therapy  []Other:________________________________________________________________    Thank you for this referral.   Jevon Alcala 7/23/2020 12:57 PM    NOTE TO PHYSICIAN:  [address]   FAX TO [facility]: [phone]  If you are unable to process this request in 24 hours please contact our office: [phone] I have read the above report and request that my patient continue as recommended. ? I have read the above report and request that my patient continue therapy with the following changes/special instructions:____________________________________  ? I have read the above report and request that my patient be discharged from therapy.     Physicians signature: ______________________________Date: ______Time:______

## 2020-07-29 ENCOUNTER — HOSPITAL ENCOUNTER (OUTPATIENT)
Dept: PHYSICAL THERAPY | Age: 65
Discharge: HOME | End: 2020-07-29
Payer: COMMERCIAL

## 2020-07-29 PROCEDURE — 97110 THERAPEUTIC EXERCISES: CPT

## 2020-07-29 NOTE — PROGRESS NOTES
PT DAILY TREATMENT NOTE 10-18    Patient Name: Jose Caicedo  Date:2020  : 1955  [x]  Patient  Verified  Payor: Norma Robb / [place]: [place] [employer]  [facility] PT / Product Type: Commerical /    In time:5:00  Out time:5:54  Total Treatment Time (min): 54  Visit #: 1 of 10    Medicare/BCBS Only   Total Timed Codes (min):   1:1 Treatment Time:         Treatment Area: Strain of muscle, fascia and tendon of the posterior muscle group at thigh level, unspecified thigh, subsequent encounter [S76.319D]    SUBJECTIVE  Pain Level (0-10 scale): 4-5  Any medication changes, allergies to medications, adverse drug reactions, diagnosis change, or new procedure performed?: [x] No    [] Yes (see summary sheet for update)  Subjective functional status/changes:   [] No changes reported  \"I could barely walk to the car today, my legs were hurting a lot. I am still unsure if therapy is working sometimes. My doctor talked about getting an MRI. \"    OBJECTIVE    Modality rationale: decrease pain and increase tissue extensibility to improve the patients ability to relax and sleep following therapy session to improve restorative sleep patterns.     Min Type Additional Details    [x] Estim:  []Unatt       []IFC  []Premod                        []Other:  []w/ice   []w/heat  Position:   Location:     [] Estim: []Att    []TENS instruct  []NMES                    []Other:  []w/US   []w/ice   []w/heat  Position:  Location:    []  Traction: [] Cervical       []Lumbar                       [] Prone          []Supine                       []Intermittent   []Continuous Lbs:  [] before manual  [] after manual    []  Ultrasound: []Continuous   [] Pulsed                           []1MHz   []3MHz W/cm2:  Location:    []  Iontophoresis with dexamethasone         Location: [] Take home patch   [] In clinic   10 []  Ice     [x]  heat  []  Ice massage  []  Laser   []  Anodyne Position: prone  Location: bilateral posterior thighs    []  Laser with stim  []  Other:  Position:  Location:    []  Vasopneumatic Device Pressure:       [] lo [] med [] hi   Temperature: [] lo [] med [] hi   [x] Skin assessment post-treatment:  [x]intact []redness- no adverse reaction    []redness  adverse reaction:       44 min Therapeutic Exercise:  [x] See flow sheet :   Rationale: increase ROM and increase strength to improve the patients ability to perform gait and transfers with improved LE strength and mobility. With   [x] TE   [] TA   [] neuro   [] other: Patient Education: [x] Review HEP    [] Progressed/Changed HEP based on:   [] positioning   [] body mechanics   [] transfers   [] heat/ice application    [] other:      Other Objective/Functional Measures: -Initiated modified Nordic hamstring curls today     Pain Level (0-10 scale) post treatment: 3    ASSESSMENT/Changes in Function: Patient requiring verbal and visual cuing for newly introduced Nordic hamstring curl intervention today. Her technique with this intervention improved with time, demonstrating increased hamstring engagement compared to lumbar engagement, although she does report some increased lumbar pain at end of session today. Patient will continue to benefit from skilled PT services to modify and progress therapeutic interventions, address functional mobility deficits, address ROM deficits, address strength deficits, analyze and address soft tissue restrictions, analyze and cue movement patterns, analyze and modify body mechanics/ergonomics, assess and modify postural abnormalities and address imbalance/dizziness to attain remaining goals. []  See Plan of Care  []  See progress note/recertification  []  See Discharge Summary         Progress towards goals / Updated goals:  1.  Pt will walk 20 min without pain to improve household and community ambulation. .               Last note: she reports less pain with walking the other day but overall continues to have pain with walking short distances    Current: reassess closer to MD note   2. Pt will increase FOTO score to set d/c points to demonstrate improved functional mobility.             Last note:  FOTO 60 pts    Current: reassess closer to MD note   3.  Pt will increase abduction/extension to 5/5 with MMT to improve ease with gait & safety with ambulation.                Last note:  B hip abduction 4/5, B hip extension 4/5   Current: reassess closer to MD note     PLAN  [x]  Upgrade activities as tolerated     [x]  Continue plan of care  []  Update interventions per flow sheet       []  Discharge due to:_  []  Other:_      Rossana English 7/29/2020  12:14 PM    Future Appointments   Date Time Provider Leanne Martin   7/29/2020  5:00 PM Rito Tellez [facility]   7/31/2020  4:15 PM Gavin Choudhury, PT [facility]   8/4/2020  2:40 PM Rigo, [address] [facility]   8/11/2020  2:45 PM HBV KOBY RM 3 3D HBVRMAM HBV   8/11/2020  3:00 PM HBV BONE DEXA RM 1 HBVRBD HBV

## 2020-07-31 ENCOUNTER — HOSPITAL ENCOUNTER (OUTPATIENT)
Dept: PHYSICAL THERAPY | Age: 65
Discharge: HOME | End: 2020-07-31
Payer: COMMERCIAL

## 2020-08-05 ENCOUNTER — HOSPITAL ENCOUNTER (OUTPATIENT)
Dept: PHYSICAL THERAPY | Age: 65
Discharge: HOME | End: 2020-08-05
Payer: COMMERCIAL

## 2020-08-05 PROCEDURE — 97140 MANUAL THERAPY 1/> REGIONS: CPT

## 2020-08-05 PROCEDURE — 97110 THERAPEUTIC EXERCISES: CPT

## 2020-08-05 NOTE — PROGRESS NOTES
PT DAILY TREATMENT NOTE 10-18    Patient Name: Aly Rosario  Date:2020  : 1955  [x]  Patient  Verified  Payor: Rosa Frausto / [facility] [place] [facility] Product Type: Commerical /    In time: 4:36  Out time: 5:20  Total Treatment Time (min): 44  Visit #: 2 of 10    Treatment Area: Strain of muscle, fascia and tendon of the posterior muscle group at thigh level, unspecified thigh, subsequent encounter [S76.813D]    SUBJECTIVE  Pain Level (0-10 scale): 4/10  Any medication changes, allergies to medications, adverse drug reactions, diagnosis change, or new procedure performed?: [x] No    [] Yes (see summary sheet for update)  Subjective functional status/changes:   [] No changes reported  Pt reported that she has been doing fine, but she continues to have pain while walking. OBJECTIVE  36 min Therapeutic Exercise:  [x] See flow sheet :   Rationale: increase ROM and increase strength to improve the patients ability to increase ease of ADLs and improve pt's tolerance for ambulation    8 min Manual Therapy:  Cross friction massage and trigger point release to the hamstrings   Rationale: decrease pain, increase tissue extensibility and decrease trigger points to increase patient's tolerance to ADLs and increase QOL          With   [x] TE   [] TA   [] neuro   [] other: Patient Education: [x] Review HEP    [] Progressed/Changed HEP based on:   [] positioning   [] body mechanics   [] transfers   [] heat/ice application    [] other:      Other Objective/Functional Measures:   Pt tolerated modified Nordic hamstring exercises very well  Started to have pain with the RDLs, so weight was reduced  Pt reported slight improvement in pain with manual.    Pain Level (0-10 scale) post treatment: 4/10  ASSESSMENT/Changes in Function:   Pt is slowly progressing towards her goals. She continues to demonstrate B decreased hip strength and pain with ambulation.  Her tolerance for exercises was limited today due to increased pain at the insertion of the hamstrings. Pt required minimal cueing for most exercises, but continues to need cueing for maintaining maximal knee extension and neutral ankle with straight leg raises. Patient will continue to benefit from skilled PT services to modify and progress therapeutic interventions, address functional mobility deficits, address ROM deficits, address strength deficits, analyze and address soft tissue restrictions, analyze and cue movement patterns and analyze and modify body mechanics/ergonomics to attain remaining goals. Progress towards goals / Updated goals:  1. Pt will walk 20 min without pain to improve household and community ambulation. .               Last note: she reports less pain with walking the other day but overall continues to have pain with walking short distances               Current: reassess closer to MD note   2. Pt will increase FOTO score to set d/c points to demonstrate improved functional mobility.                Last note:  FOTO 60 pts               Current: reassess closer to MD note   3.  Pt will increase abduction/extension to 5/5 with MMT to improve ease with gait & safety with ambulation.               Last note:  B hip abduction 4/5, B hip extension 4/5              Current: reassess closer to MD note     PLAN  []  Upgrade activities as tolerated     [x]  Continue plan of care  []  Update interventions per flow sheet       []  Discharge due to:_  []  Other:_      Shakila Espinal 8/5/2020  4:43 PM    Future Appointments   Date Time Provider Leanne Martin   8/11/2020  2:45 PM HBV KOBY RM 3 3D HBVRMAM HBV   8/11/2020  3:00 PM HBV BONE DEXA RM 1 HBVRBD HBV   8/13/2020  4:00 PM Rigo, [address] [facility]

## 2020-08-11 ENCOUNTER — HOSPITAL ENCOUNTER (OUTPATIENT)
Dept: PHYSICAL THERAPY | Age: 65
Discharge: HOME | End: 2020-08-11
Payer: COMMERCIAL

## 2020-08-11 ENCOUNTER — HOSPITAL ENCOUNTER (OUTPATIENT)
Dept: MAMMOGRAPHY | Age: 65
Discharge: HOME | End: 2020-08-11
Attending: FAMILY MEDICINE

## 2020-08-11 ENCOUNTER — HOSPITAL ENCOUNTER (OUTPATIENT)
Dept: BONE DENSITY | Age: 65
Discharge: HOME | End: 2020-08-11
Attending: FAMILY MEDICINE

## 2020-08-11 DIAGNOSIS — N95.1 MENOPAUSAL AND FEMALE CLIMACTERIC STATES: ICD-10-CM

## 2020-08-11 DIAGNOSIS — Z12.31 ENCOUNTER FOR SCREENING MAMMOGRAM FOR BREAST CANCER: ICD-10-CM

## 2020-08-11 PROCEDURE — 77080 DXA BONE DENSITY AXIAL: CPT

## 2020-08-11 PROCEDURE — 97110 THERAPEUTIC EXERCISES: CPT

## 2020-08-11 PROCEDURE — 77063 BREAST TOMOSYNTHESIS BI: CPT

## 2020-08-11 NOTE — PROGRESS NOTES
PT DAILY TREATMENT NOTE 10-18    Patient Name: Rohit Contreras  Date:2020  : 1955  [x]  Patient  Verified  Payor: Jhoan Ye / [place]: [place] [employer] HMO / Product Type: HMO /    In time:5:15  Out time:6:13  Total Treatment Time (min): 58  Visit #: 3 of 10    Treatment Area: Strain of muscle, fascia and tendon of the posterior muscle group at thigh level, unspecified thigh, subsequent encounter [L06.817D]    SUBJECTIVE  Pain Level (0-10 scale): 3/10  Any medication changes, allergies to medications, adverse drug reactions, diagnosis change, or new procedure performed?: [x] No    [] Yes (see summary sheet for update)  Subjective functional status/changes:   [] No changes reported  Patient reports some overall decrease in pain, stating \"for the most part, it's been about the same, with occasional decrease. \"    OBJECTIVE    Modality rationale: decrease pain and increase tissue extensibility to improve the patients ability to  tolerate exercises and return to daily activity with ease.    Min Type Additional Details    [] Estim:  []Unatt       []IFC  []Premod                        []Other:  []w/ice   []w/heat  Position:  Location:    [] Estim: []Att    []TENS instruct  []NMES                    []Other:  []w/US   []w/ice   []w/heat  Position:  Location:    []  Traction: [] Cervical       []Lumbar                       [] Prone          []Supine                       []Intermittent   []Continuous Lbs:  [] before manual  [] after manual    []  Ultrasound: []Continuous   [] Pulsed                           []1MHz   []3MHz W/cm2:  Location:    []  Iontophoresis with dexamethasone         Location: [] Take home patch   [] In clinic   10 []  Ice     [x]  heat  []  Ice massage  []  Laser   []  Anodyne Position: prone  Location: bilat hamstrings    []  Laser with stim  []  Other:  Position:  Location:    []  Vasopneumatic Device Pressure:       [] lo [] med [] hi   Temperature: [] lo [] med [] hi   [] Skin assessment post-treatment:  []intact []redness- no adverse reaction    []redness  adverse reaction:     48 min Therapeutic Exercise:  [] See flow sheet :   Rationale: increase ROM and increase strength to improve the patients ability to perform ADLs with ease            With   [x] TE   [] TA   [] neuro   [] other: Patient Education: [x] Review HEP    [] Progressed/Changed HEP based on:   [x] positioning   [x] body mechanics   [] transfers   [] heat/ice application    [] other:      Other Objective/Functional Measures:   Hip abd (R) 4+/5, (L) 4/5; (B) hip ext 4/5   Tolerated increases in weight well  Cuing for form with mod nordic HSC on SB    Pain Level (0-10 scale) post treatment: 2/10     ASSESSMENT/Changes in Function: Patient continues to report consistent pain levels with only minimal relief in the hamstrings. Post MHP pain is reduced, however not seen with daily activity and completing therex. No reported improvement in pain with ambulation for short distances. Reassess POC for modifications of therex at next visit. Patient will continue to benefit from skilled PT services to modify and progress therapeutic interventions, address functional mobility deficits, address ROM deficits, address strength deficits, analyze and address soft tissue restrictions, analyze and cue movement patterns, analyze and modify body mechanics/ergonomics and assess and modify postural abnormalities to attain remaining goals. [x]  See Plan of Care  []  See progress note/recertification  []  See Discharge Summary         Progress towards goals / Updated goals:  1. Pt will walk 20 min without pain to improve household and community ambulation. .               Last note: she reports less pain with walking the other day but overall continues to have pain with walking short distances               IFXNWMD: reassess closer to MD note   2. Pt will increase FOTO score to set d/c points to demonstrate improved functional mobility.             Last note:  FOTO 60 pts               IYBWTKO: reassess closer to MD note   3.  Pt will increase abduction/extension to 5/5 with MMT to improve ease with gait & safety with ambulation.               Last note:  B hip abduction 4/5, B hip extension 4/5              Current: reassess closer to MD note     PLAN  []  Upgrade activities as tolerated     [x]  Continue plan of care  []  Update interventions per flow sheet       []  Discharge due to:_  []  Other:_      EMERSON Joy 8/11/2020  5:55 PM    Future Appointments   Date Time Provider Leanne Martin   8/13/2020  4:00 PM Grace, [address]   8/14/2020  4:30 PM Brooklyn ARMANDO [facility]   8/17/2020  6:00 PM Phoebe Rene, PT [facility]   8/20/2020  5:15 PM Phoebe Rene, PT [facility]   8/24/2020  5:15 PM Phoebe Gums, PT [facility]   8/27/2020  5:15 PM Phoebe Gums, PT [facility]

## 2020-08-13 ENCOUNTER — OFFICE VISIT (OUTPATIENT)
Dept: ORTHOPEDIC SURGERY | Age: 65
End: 2020-08-13

## 2020-08-13 VITALS
SYSTOLIC BLOOD PRESSURE: 135 MMHG | BODY MASS INDEX: 27.88 KG/M2 | TEMPERATURE: 96.7 F | HEART RATE: 78 BPM | WEIGHT: 142 LBS | RESPIRATION RATE: 15 BRPM | DIASTOLIC BLOOD PRESSURE: 67 MMHG | HEIGHT: 60 IN

## 2020-08-13 DIAGNOSIS — S76.302D HAMSTRING INJURY, LEFT, SUBSEQUENT ENCOUNTER: ICD-10-CM

## 2020-08-13 DIAGNOSIS — S76.301D HAMSTRING INJURY, RIGHT, SUBSEQUENT ENCOUNTER: Primary | ICD-10-CM

## 2020-08-13 RX ORDER — IBUPROFEN 800 MG/1
TABLET ORAL
COMMUNITY

## 2020-08-13 NOTE — PATIENT INSTRUCTIONS
Get MRI completed and return to the office afterward for results. You should receive call to schedule the MRI. If have not been called to schedule within a couple of days, please call [phone] to schedule. After MRI is scheduled, please call our office and schedule follow-up appointment for 2-3 days after the date of the MRI.

## 2020-08-13 NOTE — PROGRESS NOTES
HISTORY OF PRESENT ILLNESS    Keith Campuzano 1955 is a 60y.o. year old female comes in today to be evaluated and treated for: hamstrings B/L    Since last appt has noticed improvement with PT about 12 visits thus far. Pain level 6/10. Using prednisone taper prior with benefit. IMAGING: XR left hip 2020  IMPRESSION:  1. Mild right and no significant left hip arthrosis. 2.  Mild to moderate lower lumbar facet arthrosis. XR right hip 2020  IMPRESSION:  1. Mild right and no significant left hip arthrosis. 2.  Mild to moderate lower lumbar facet arthrosis. Past Surgical History:   Procedure Laterality Date    HX  SECTION  91 & 96    HX PELVIC LAPAROSCOPY      (+)endometriosis    HX TUBAL LIGATION      [place] COLONOSCOPY FLX DX W/COLLJ SPEC WHEN PFRMD  2009    normal ; dr. Hsieh Profit History     Socioeconomic History    Marital status:      Spouse name: Not on file    Number of children: Not on file    Years of education: Not on file    Highest education level: Not on file   Tobacco Use    Smoking status: Never Smoker    Smokeless tobacco: Never Used   Substance and Sexual Activity    Alcohol use: Yes     Comment: socially    Drug use: No     Current Outpatient Medications   Medication Sig Dispense Refill    ibuprofen (MOTRIN) 800 mg tablet Take  by mouth.  estradiol (VAGIFEM) 10 mcg Tab vaginal tablet Insert 10 mcg into vagina every Tuesday and Friday.  calcium carbonate-vitamin d2 500 mg(1,250mg) -200 unit Tab Take 1 Tab by mouth daily.  MULTIVITAMIN/IRON/FOLIC ACID (CENTRUM COMPLETE PO) Take  by mouth daily.  buPROPion SR (WELLBUTRIN SR) 150 mg SR tablet Take  by mouth. Take 1 to 2 tablets daily      FLUoxetine (PROZAC) 20 mg capsule TAKE 1 CAPSULE BY MOUTH ONCE DAILY  3    meloxicam (MOBIC) 15 mg tablet Take 1 Tab by mouth daily (with breakfast).  30 Tab 1    amphetamine-dextroamphetamine XR (ADDERALL XR) 30 mg XR capsule Take 30 mg by mouth every morning. Past Medical History:   Diagnosis Date    ADD (attention deficit disorder) without hyperactivity     Arthritis     knees    Depression     Migraine     UTI (urinary tract infection)      Family History   Problem Relation Age of Onset    Hypertension Mother     Thyroid Disease Mother     Hypertension Father     Heart Attack Father     Other Father         circulation problems    Cancer Father     Colon Polyps Father          ROS:  No numb, tingle, swell, incont, fever. Objective:  /67   Pulse 78   Temp (!) 96.7 °F (35.9 °C)   Resp 15   Ht 5' (1.524 m)   Wt 142 lb (64.4 kg)   BMI 27.73 kg/m²   GEN: Appears stated age in NAD. HEENT: Conjunctiva/lids WNL. External canals/nares WNL. Tongue midline. PERRL, EOMI. Hearing intact. NECK: Trachea midline. Supple, Full ROM. CARDIAC No peripheral edema. LUNGS: normal effort. ABD: Soft, NT.   MS: No clubbing/cyanosis. Steady gait. Strength +5/5 all extremities with full ROM and good tone.  minimal TTP bicep femoris B/L right slightly worse left  NEURO: Sensation intact light touch. Good coordination. SKIN: Warm/dry w/o rash.     Assessment/Plan:     ICD-10-CM ICD-9-CM    1. Hamstring injury, right, subsequent encounter  S76.301D V58.89 MRI FEMUR RT  WO CONT     959.6    2. Hamstring injury, left, subsequent encounter  S76.302D V58.89      959.6      Patient (or guardian if minor) verbalizes understanding of evaluation and plan. Time with Pt 26 minutes, >50% of which was counseling pt regarding Dx and Tx options and coordination of care. Will await MRI as not improved with conservative and RTC after for results.

## 2020-08-13 NOTE — PROGRESS NOTES
AVS reviewed: YES  HEP: N/A  Resources Provided: YES MRI order  Patient questions/concerns answered: NO. Pt denied questions/concerns  Patient verbalized understanding of treatment plan: YES

## 2020-08-14 ENCOUNTER — HOSPITAL ENCOUNTER (OUTPATIENT)
Dept: PHYSICAL THERAPY | Age: 65
Discharge: HOME | End: 2020-08-14
Payer: COMMERCIAL

## 2020-08-17 ENCOUNTER — HOSPITAL ENCOUNTER (OUTPATIENT)
Dept: PHYSICAL THERAPY | Age: 65
Discharge: HOME | End: 2020-08-17
Payer: COMMERCIAL

## 2020-08-17 PROCEDURE — 97140 MANUAL THERAPY 1/> REGIONS: CPT

## 2020-08-17 PROCEDURE — 97110 THERAPEUTIC EXERCISES: CPT

## 2020-08-17 NOTE — PROGRESS NOTES
PT DAILY TREATMENT NOTE 10-18    Patient Name: Adriano Castillo  Date:2020  : 1955  [x]  Patient  Verified  Payor: Gerber Robbins / Plan: [employer] / Product Type: HMO /    In time: 6:02  Out time: 6:41  Total Treatment Time (min): 39  Visit #: 4 of 10    Treatment Area: Strain of muscle, fascia and tendon of the posterior muscle group at thigh level, unspecified thigh, subsequent encounter [S76.989D]    SUBJECTIVE  Pain Level (0-10 scale):  310  Any medication changes, allergies to medications, adverse drug reactions, diagnosis change, or new procedure performed?: [x] No    [] Yes (see summary sheet for update)  Subjective functional status/changes:   [] No changes reported  Pt. Reports she is feeling about the same. She reports she is schedule for an MRI later this month. Her pain continues to fluctuate. OBJECTIVE    31 min Therapeutic Exercise:  [x] See flow sheet :   Rationale: increase ROM and increase strength to improve the patients ability to increase ease of ADLs    8 min Manual Therapy:  STM and trigger point release to B hamstrings   Rationale: decrease pain, increase ROM and increase tissue extensibility to increase ease of ADLs          With   [x] TE   [] TA   [] neuro   [] other: Patient Education: [x] Review HEP    [] Progressed/Changed HEP based on:   [] positioning   [] body mechanics   [] transfers   [] heat/ice application    [] other:      Other Objective/Functional Measures:   Pt. Continues to have difficulty with [place] curls  She tolerated increased reps during SLR well   Pt. Has more muscle tightness along lateral hamstrings compared to medial bilaterally   Pt. Was challenged with 20# resistance during eccentric hamstring curls    Pain Level (0-10 scale) post treatment: 5/10    ASSESSMENT/Changes in Function:  Pt. Is making limited progress towards goals. She continues to have significant pain with ambulation that is not consistent.  She continues to have decreased B hamstring strength. Patient will continue to benefit from skilled PT services to modify and progress therapeutic interventions, address functional mobility deficits, address ROM deficits, address strength deficits, analyze and address soft tissue restrictions, analyze and cue movement patterns, analyze and modify body mechanics/ergonomics and assess and modify postural abnormalities to attain remaining goals. Progress towards goals / Updated goals:  1. Pt will walk 20 min without pain to improve household and community ambulation. .               Last note: she reports less pain with walking the other day but overall continues to have pain with walking short distances              not met: continues to have fluctuating pain (8/17/20)  2. Pt will increase FOTO score to set d/c points to demonstrate improved functional mobility.                Last note:  FOTO 60 pts               HUTLXXE: reassess closer to MD note   3.  Pt will increase abduction/extension to 5/5 with MMT to improve ease with gait & safety with ambulation.               Last note:  B hip abduction 4/5, B hip extension 4/5              Current: reassess closer to MD note     PLAN  []  Upgrade activities as tolerated     [x]  Continue plan of care  []  Update interventions per flow sheet       []  Discharge due to:_  []  Other:_      Deejay Mueller, PT 8/17/2020  8:08 AM    Future Appointments   Date Time Provider Leanne Martin   8/17/2020  6:00 PM Adele Sosa, [place] [facility]   8/20/2020  5:15 PM Bonnielee Render, PT [facility]   8/24/2020  5:15 PM Bonnilynne Render, PT [facility]   8/26/2020  5:30 PM [facility] MRI RM 1 [facility]   8/27/2020  5:15 PM Bonnielee Render, PT [facility]

## 2020-08-20 ENCOUNTER — APPOINTMENT (OUTPATIENT)
Dept: PHYSICAL THERAPY | Age: 65
End: 2020-08-20
Payer: COMMERCIAL

## 2020-08-24 ENCOUNTER — APPOINTMENT (OUTPATIENT)
Dept: PHYSICAL THERAPY | Age: 65
End: 2020-08-24
Payer: COMMERCIAL

## 2020-08-26 ENCOUNTER — HOSPITAL ENCOUNTER (OUTPATIENT)
Age: 65
Discharge: HOME | End: 2020-08-26
Attending: FAMILY MEDICINE
Payer: COMMERCIAL

## 2020-08-26 DIAGNOSIS — S76.301D HAMSTRING INJURY, RIGHT, SUBSEQUENT ENCOUNTER: ICD-10-CM

## 2020-08-26 PROCEDURE — 73718 MRI LOWER EXTREMITY W/O DYE: CPT

## 2020-08-27 ENCOUNTER — APPOINTMENT (OUTPATIENT)
Dept: PHYSICAL THERAPY | Age: 65
End: 2020-08-27
Payer: COMMERCIAL

## 2020-09-01 ENCOUNTER — OFFICE VISIT (OUTPATIENT)
Dept: ORTHOPEDIC SURGERY | Age: 65
End: 2020-09-01

## 2020-09-01 VITALS
SYSTOLIC BLOOD PRESSURE: 116 MMHG | HEART RATE: 78 BPM | HEIGHT: 60 IN | DIASTOLIC BLOOD PRESSURE: 65 MMHG | WEIGHT: 141 LBS | RESPIRATION RATE: 15 BRPM | BODY MASS INDEX: 27.68 KG/M2 | TEMPERATURE: 96.2 F

## 2020-09-01 DIAGNOSIS — S76.302D HAMSTRING INJURY, LEFT, SUBSEQUENT ENCOUNTER: ICD-10-CM

## 2020-09-01 DIAGNOSIS — S76.301D HAMSTRING INJURY, RIGHT, SUBSEQUENT ENCOUNTER: Primary | ICD-10-CM

## 2020-09-01 RX ORDER — MELOXICAM 15 MG/1
15 TABLET ORAL
Qty: 30 TAB | Refills: 1 | Status: SHIPPED | OUTPATIENT
Start: 2020-09-01

## 2020-09-01 NOTE — PROGRESS NOTES
HISTORY OF PRESENT ILLNESS    Haritha Carney 1955 is a 60y.o. year old female comes in today to be evaluated and treated for: right femur MRI    Since last appt has noticed improvement PT and minimal HEP on own. Pain level 3/10. Using prednisone prior and ibuprofen 800mg with mild benefit. No PT lately as awaiting MRI but has 7 visits auth now. IMAGING: MRI right femur 2020  IMPRESSION:  1. No definite findings to explain the patient's symptoms. 2.  Mild tendinosis at the origins of the bilateral semimembranosus tendons. No  evidence of hamstring tendon or muscle tear. 3.  Mild bilateral gluteus minimus insertional tendinosis. Mild peritrochanteric  edema about the bilateral greater trochanteric bursae, without klever bursitis. 4.  Mild right hip arthrosis.       XR left hip 2020  IMPRESSION:  1.  Mild right and no significant left hip arthrosis. 2.  Mild to moderate lower lumbar facet arthrosis.     XR right hip 2020  IMPRESSION:  1.  Mild right and no significant left hip arthrosis. 2.  Mild to moderate lower lumbar facet arthrosis. Past Surgical History:   Procedure Laterality Date    HX  SECTION  91 & 96    HX PELVIC LAPAROSCOPY      (+)endometriosis    HX TUBAL LIGATION      GA COLONOSCOPY FLX DX W/COLLJ SPEC WHEN PFRMD  2009    normal ; dr. Ralf Mckeon History     Socioeconomic History    Marital status:      Spouse name: Not on file    Number of children: Not on file    Years of education: Not on file    Highest education level: Not on file   Tobacco Use    Smoking status: Never Smoker    Smokeless tobacco: Never Used   Substance and Sexual Activity    Alcohol use: Yes     Comment: socially    Drug use: No     Current Outpatient Medications   Medication Sig Dispense Refill    alendronate sodium (FOSAMAX PO) Take 70 mg by mouth every seven (7) days.  ibuprofen (MOTRIN) 800 mg tablet Take  by mouth.       estradiol (VAGIFEM) 10 mcg Tab vaginal tablet Insert 10 mcg into vagina every Tuesday and Friday.  calcium carbonate-vitamin d2 500 mg(1,250mg) -200 unit Tab Take 1 Tab by mouth daily.  MULTIVITAMIN/IRON/FOLIC ACID (CENTRUM COMPLETE PO) Take  by mouth daily.  buPROPion SR (WELLBUTRIN SR) 150 mg SR tablet Take  by mouth. Take 1 to 2 tablets daily      FLUoxetine (PROZAC) 20 mg capsule TAKE 1 CAPSULE BY MOUTH ONCE DAILY  3    meloxicam (MOBIC) 15 mg tablet Take 1 Tab by mouth daily (with breakfast). 30 Tab 1    amphetamine-dextroamphetamine XR (ADDERALL XR) 30 mg XR capsule Take 30 mg by mouth every morning. Past Medical History:   Diagnosis Date    ADD (attention deficit disorder) without hyperactivity     Arthritis     knees    Depression     Migraine     UTI (urinary tract infection)      Family History   Problem Relation Age of Onset    Hypertension Mother     Thyroid Disease Mother     Hypertension Father     Heart Attack Father     Other Father         circulation problems    Cancer Father     Colon Polyps Father          ROS:  No numb, tingle, swell, incont, fever. Objective:  /65   Pulse 78   Temp (!) 96.2 °F (35.7 °C)   Resp 15   Ht 5' (1.524 m)   Wt 141 lb (64 kg)   BMI 27.54 kg/m²   GEN: Appears stated age in NAD. HEENT: Conjunctiva/lids WNL. External canals/nares WNL. Tongue midline. PERRL, EOMI. Hearing intact. NECK: Trachea midline. Supple, Full ROM. CARDIAC No peripheral edema. LUNGS: normal effort. ABD: Soft, NT.   MS: No clubbing/cyanosis. Steady gait. Strength +5/5 all extremities with full ROM and good tone.  minimal TTP bicep femoris B/L right slightly worse left  NEURO: Sensation intact light touch. Good coordination. SKIN: Warm/dry w/o rash.     Assessment/Plan:     ICD-10-CM ICD-9-CM    1. Hamstring injury, right, subsequent encounter  S76.301D V58.89 meloxicam (Mobic) 15 mg tablet     959.6    2.  Hamstring injury, left, subsequent encounter  S76.302D V58.89 meloxicam (Mobic) 15 mg tablet     959.6        Patient (or guardian if minor) verbalizes understanding of evaluation and plan. Discussed essentially normal MRI aside from tendinosis/itis at origin hamstring and insertion glute min so needs to do PT/HEp as discussed prior. Will Rx mobic PRn and RTC as needed.

## 2020-09-01 NOTE — PROGRESS NOTES
AVS reviewed: YES  HEP: Pt declined demo  Resources Provided: YES YouTube Videos  Patient questions/concerns answered: YES.  Confirmed for Pt that bilateral means both legs  Patient verbalized understanding of treatment plan: YES

## 2020-09-01 NOTE — PATIENT INSTRUCTIONS
Continue with physical therapy, use medication as prescribed, perform stretches daily, and return to the office as needed. Search YouTube for my channel: 
 
Dr. Monster Porter Hip Stretches

## 2020-09-10 ENCOUNTER — APPOINTMENT (OUTPATIENT)
Dept: PHYSICAL THERAPY | Age: 65
End: 2020-09-10
Payer: COMMERCIAL

## 2020-09-15 ENCOUNTER — HOSPITAL ENCOUNTER (OUTPATIENT)
Dept: PHYSICAL THERAPY | Age: 65
Discharge: HOME | End: 2020-09-15
Payer: COMMERCIAL

## 2020-09-15 PROCEDURE — 97140 MANUAL THERAPY 1/> REGIONS: CPT

## 2020-09-15 PROCEDURE — 97110 THERAPEUTIC EXERCISES: CPT

## 2020-09-15 NOTE — PROGRESS NOTES
PT DAILY TREATMENT NOTE 10-18    Patient Name: Glo Wallace  Date:9/15/2020  : 1955  [x]  Patient  Verified  Payor: Danilo Contreras / Plan: [employer] [place] [employer] / Product Type: HMO /    In time: 5:17  Out time: 6:00  Total Treatment Time (min):43   Visit #: 5 of 10    Treatment Area: Strain of muscle, fascia and tendon of the posterior muscle group at thigh level, unspecified thigh, subsequent encounter [S76.319D]    SUBJECTIVE  Pain Level (0-10 scale): 3/10   Any medication changes, allergies to medications, adverse drug reactions, diagnosis change, or new procedure performed?: [x] No    [] Yes (see summary sheet for update)  Subjective functional status/changes:   [] No changes reported  Pt reports continued pain that fluctuates. She feels like she shouldn't have waited for the MRI to continue with PT. 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 She is unable to define a walking time before increased pain. She reports the MD told her to continue with PT and gave her his youtube channel for stretches of the hip.        OBJECTIVE    28 min Therapeutic Exercise:  [x] See flow sheet :   Rationale: increase ROM and increase strength to improve the patients ability to increase ease of ADLs    15 min Manual Therapy:  MET for right AI and left PI x 2; posterior right innominate mobs; shotgun technique; foam rolling to the hamstrings   Rationale: decrease pain, increase ROM and increase tissue extensibility to increase ease of ADLs          With   [x] TE   [] TA   [] neuro   [] other: Patient Education: [x] Review HEP    [] Progressed/Changed HEP based on:   [] positioning   [] body mechanics   [] transfers   [] heat/ice application    [] other:      Other Objective/Functional Measures:   Walking without pain: inconclusive; pt says it fluctuates but couldn't report an amount of time  FOTO: not working  Abduction MMT: left 4/5 right 4/5  Extension MMT: left 4/5  Right 4/5  In standing pt has left lateral shift of her trunk; during forward flexion appears to have a right rib hump  Improved pain with alignment correction  Educated on hip flexor stretching at well with either ramana stretch or standing hip flexor stretch right>left  Worked on eccentric strengthening in prone  Educated on sitting posture to sit back in her chair with legs placed on a small stool to prevent hanging down which would cause increased pelvic pull and low back pull    Pain Level (0-10 scale) post treatment: 2/10     ASSESSMENT/Changes in Function: Pt continues to have inconsistent reports of pain and fluctuations with ambulation tolerance. 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 She does have a significant pelvic obliquity with a left PI but in standing also an apparent left lateral trunk shift with rib hump that may be contributing to some malalignment issues. Skilled PT is medically necessary to improve neutral pelvis and increase HS flexibility while working on eccentric strengthening for decreased pain with sitting and prolonged ambulation. Progress towards goals / Updated goals:  1. Pt will walk 20 min without pain to improve household and community ambulation. .               Last note: she reports less pain with walking the other day but overall continues to have pain with walking short distances              not met: continues to have fluctuating pain (8/17/20) pt unable to define a numerical time   2. Pt will increase FOTO score to set d/c points to demonstrate improved functional mobility.                Last note:  FOTO 60 pts               [id number]: reassess closer to MD note NOT working today  3.  Pt will increase abduction/extension to 5/5 with MMT to improve ease with gait & safety with ambulation.               Last note:  B hip abduction 4/5, B hip extension 4/5              Current: reassess closer to MD note remains unchanged 4/5 bilaterally    PLAN  [x]  Upgrade activities as tolerated     [x]  Continue plan of care  []  Update interventions per flow sheet       []  Discharge due to:_  []  Other:_      Liyah Mendieta PTA 9/15/2020  8:08 AM    Future Appointments   Date Time Provider Leanne Martin   9/15/2020  5:15 PM Saad Flores PTA [facility]   9/17/2020  5:15 PM Roxana Santizo PT [facility]

## 2020-09-17 ENCOUNTER — HOSPITAL ENCOUNTER (OUTPATIENT)
Dept: PHYSICAL THERAPY | Age: 65
Discharge: HOME | End: 2020-09-17
Payer: COMMERCIAL

## 2020-09-17 PROCEDURE — 97110 THERAPEUTIC EXERCISES: CPT

## 2020-09-17 NOTE — PROGRESS NOTES
[facility] [employer]  [address] [place] [address]  [phone] fax    Physical Therapy Progress Note  Patient name: Zhanna Coronel Start of Care: 2020   Referral source: Asha Mcmullen DO : 1955                Medical Diagnosis: Strain of muscle, fascia and tendon of the posterior muscle group at thigh level, unspecified thigh, subsequent encounter [R14.895Z]  Payor: Lorna Cuevas / Plan: [address] [facility] / Product Type: Commerical /  Onset Date:20                Treatment Diagnosis: B hamstring pain/weakness   Prior Hospitalization: see medical history Provider#: [id number]   Medications: Verified on Patient summary List    Comorbidities: depression   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 Is making slow progress towards goals. She has no significant change in FOTO score at 57 points. 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 She continues to have decreased B hamstring strength at 4+/5 and decreased B hip ext/add strength at 4-/5. Skilled PT is medically necessary in order to improve ambulation tolerance and strength for increased ease of ADLs and improved quality of life. Updated Goals: to be achieved in 4 weeks:  1. Patient will improve FOTO score by 14 points in order to demonstrate a significant improvement in function. 2. Patient will improve ambulation tolerance to 20 minutes in order to increase ease of community ambulation.    3. Patient will improve B hip adduction/extension strength to 4+/5 in order to increase ease of ambulation. ASSESSMENT/RECOMMENDATIONS:  [x]Continue therapy per initial plan/protocol at a frequency of  2 x per week for 4 weeks  []Continue therapy with the following recommended changes:_____________________      _____________________________________________________________________  []Discontinue therapy progressing towards or have reached established goals  []Discontinue therapy due to lack of appreciable progress towards goals  []Discontinue therapy due to lack of attendance or compliance  []Await Physician's recommendations/decisions regarding therapy  []Other:________________________________________________________________    Thank you for this referral.   Lis Askew, PT 9/17/2020 7:05 PM    NOTE TO PHYSICIAN:  PLEASE COMPLETE THE ORDERS BELOW AND   [facility] [phone]  If you are unable to process this request in 24 hours please contact our office: [phone]    I have read the above report and request that my patient continue as recommended.       Physicians signature: ______________________________Date: ______Time:______

## 2020-09-17 NOTE — PROGRESS NOTES
PT DAILY TREATMENT NOTE 10-18    Patient Name: Spike Youngblood  Date:2020  : 1955  [x]  Patient  Verified  Payor: Frank Vicente / Plan: [employer] [place] [employer] / Product Type: HMO /    In time: 5:14  Out time: 5:56  Total Treatment Time (min): 42  Visit #: 6 of 10    Treatment Area: Strain of muscle, fascia and tendon of the posterior muscle group at thigh level, unspecified thigh, subsequent encounter [S76.347D]    SUBJECTIVE  Pain Level (0-10 scale): 3/10  Any medication changes, allergies to medications, adverse drug reactions, diagnosis change, or new procedure performed?: [x] No    [] Yes (see summary sheet for update)  Subjective functional status/changes:   [] No changes reported  Pt. Reports she is feeling about the same today.      OBJECTIVE    42 min Therapeutic Exercise:  [x] See flow sheet :   Rationale: increase ROM and increase strength to improve the patients ability to increase ease of ambulation           With   [x] TE   [] TA   [] neuro   [] other: Patient Education: [x] Review HEP    [] Progressed/Changed HEP based on:   [] positioning   [] body mechanics   [] transfers   [] heat/ice application    [] other:      Other Objective/Functional Measures:   Ambulation tolerance:  FOTO: 57  Hip MMT add right: 4-/5 left: 4-/5  ext: right: 4-/5 left: 4-/5  Knee flexion MMT right: 4+/5 left: 4+/5     Pain Level (0-10 scale) post treatment: 3/10    ASSESSMENT/Changes in Function:      []  See Plan of Care  [x]  See progress note/recertification  []  See Discharge Summary         Progress towards goals / Updated goals:  See progress note    PLAN  []  Upgrade activities as tolerated     [x]  Continue plan of care  []  Update interventions per flow sheet       []  Discharge due to:_  []  Other:_      Laura Moreira, PT 2020  8:30 AM    Future Appointments   Date Time Provider Leanne Martin   2020  5:15 PM Ayala Ritter, PT [facility] [id number]   2020  3:45 PM Ayala Ritter, PT [facility]   9/25/2020  3:45 PM Christy Zavala PTA [facility]   9/29/2020  6:00 PM Christy Zavala PTA [facility]   10/1/2020  6:00 PM Sanjiv Haile, PT [facility]

## 2020-09-25 ENCOUNTER — HOSPITAL ENCOUNTER (OUTPATIENT)
Dept: PHYSICAL THERAPY | Age: 65
Discharge: HOME | End: 2020-09-25
Payer: COMMERCIAL

## 2020-09-25 PROCEDURE — 97110 THERAPEUTIC EXERCISES: CPT

## 2020-09-25 NOTE — PROGRESS NOTES
PT DAILY TREATMENT NOTE 10-18    Patient Name: Sabiha Sosa  Date:2020  : 1955  [x]  Patient  Verified  Payor: Rosa Frausto / Plan: [employer] / Product Type: HMO /    In time: 3:46  Out time: 4:35  Total Treatment Time (min): 49  Visit #: 1 of 8    Treatment Area: Strain of muscle, fascia and tendon of the posterior muscle group at thigh level, unspecified thigh, subsequent encounter [H69.433R]    SUBJECTIVE  Pain Level (0-10 scale):  3/10  Any medication changes, allergies to medications, adverse drug reactions, diagnosis change, or new procedure performed?: [x] No    [] Yes (see summary sheet for update)  Subjective functional status/changes:   [] No changes reported  Pt. Reports she felt better earlier.  Her symptoms continue to fluctuate a lot    OBJECTIVE    Modality rationale: decrease pain and increase tissue extensibility to improve the patients ability to increase ease of ADLs   Min Type Additional Details    [] Estim:  []Unatt       []IFC  []Premod                        []Other:    []w/ice   []w/heat  Position:  Location:    [] Estim: []Att    []TENS instruct  []NMES                    []Other:  []w/US   []w/ice   []w/heat  Position:  Location:    []  Traction: [] Cervical       []Lumbar                       [] Prone          []Supine                       []Intermittent   []Continuous Lbs:  [] before manual  [] after manual    []  Ultrasound: []Continuous   [] Pulsed                           []1MHz   []3MHz W/cm2:  Location:    []  Iontophoresis with dexamethasone         Location: [] Take home patch   [] In clinic   10 []  Ice     [x]  heat  []  Ice massage  []  Laser   []  Anodyne Position: seated  Location: B hamstrings    []  Laser with stim  []  Other:  Position:  Location:    []  Vasopneumatic Device Pressure:       [] lo [] med [] hi   Temperature: [] lo [] med [] hi   [x] Skin assessment post-treatment:  [x]intact []redness- no adverse reaction    []redness  adverse reaction: 39 min Therapeutic Exercise:  [x] See flow sheet :   Rationale: increase ROM and increase strength to improve the patients ability to increase ease of ADLs          With   [x] TE   [] TA   [] neuro   [] other: Patient Education: [x] Review HEP    [] Progressed/Changed HEP based on:   [] positioning   [] body mechanics   [] transfers   [] heat/ice application    [] other:      Other Objective/Functional Measures:   Pt. Tolerated PT well but had some increased pain with more reps during exercises  She continues to have limited range with [place]ordic hamstring curls  She is challenged with stability during sing leg RDLs     Pain Level (0-10 scale) post treatment:  0/10    ASSESSMENT/Changes in Function:  Pt. Is progressing slowly towards goals. She continues to have fluctuating pain and decreased B hip and hamstring strength. She has increased pain with prolonged ambulation. Patient will continue to benefit from skilled PT services to modify and progress therapeutic interventions, address functional mobility deficits, address ROM deficits, address strength deficits, analyze and address soft tissue restrictions, analyze and cue movement patterns, analyze and modify body mechanics/ergonomics and assess and modify postural abnormalities to attain remaining goals. Progress towards goals / Updated goals:  1. Patient will improve FOTO score by 14 points in order to demonstrate a significant improvement in function. 2. Patient will improve ambulation tolerance to 20 minutes in order to increase ease of community ambulation. 3. Patient will improve B hip adduction/extension strength to 4+/5 in order to increase ease of ambulation.      PLAN  []  Upgrade activities as tolerated     [x]  Continue plan of care  []  Update interventions per flow sheet       []  Discharge due to:_  []  Other:_      Jaimie Cunningham, PT 9/25/2020  3:49 PM    Future Appointments   Date Time Provider Leanne Martin   9/29/2020 4:30 PM Jose Grullon, PTA [facility]   10/1/2020  6:00 PM Carol Hall, PT [facility]

## 2020-09-29 ENCOUNTER — HOSPITAL ENCOUNTER (OUTPATIENT)
Dept: PHYSICAL THERAPY | Age: 65
Discharge: HOME | End: 2020-09-29
Payer: COMMERCIAL

## 2020-09-29 PROCEDURE — 97140 MANUAL THERAPY 1/> REGIONS: CPT

## 2020-09-29 PROCEDURE — 97110 THERAPEUTIC EXERCISES: CPT

## 2020-09-29 NOTE — PROGRESS NOTES
PT DAILY TREATMENT NOTE 10-18    Patient Name: Kaur Ortiz  Date:2020  : 1955  [x]  Patient  Verified  Payor: Skye Bucio / Plan: [employer] [place] [employer] / Product Type: HMO /    In time:4:38 Out time: 5:29  Total Treatment Time (min):51  Visit #: 2 of 8    Treatment Area: Strain of muscle, fascia and tendon of the posterior muscle group at thigh level, unspecified thigh, subsequent encounter [S76.125D]    SUBJECTIVE  Pain Level (0-10 scale):  4-5/10  Any medication changes, allergies to medications, adverse drug reactions, diagnosis change, or new procedure performed?: [x] No    [] Yes (see summary sheet for update)  Subjective functional status/changes:   [] No changes reported  Pt reports pain continues to fluctuate between both sides. She is doing some exercises at home but wasn't really exercising prior to this. She notes some back pain at times but not much. She states she has tried to adjust her sitting posture at work but sometimes gets pain from going from a sitting position to standing.       OBJECTIVE    26 min Therapeutic Exercise:  [x] See flow sheet :   Rationale: increase ROM and increase strength to improve the patients ability to increase ease of ADLs    15 min Manual Therapy:  leg lengthening for right upslip; MET for right AI and left PI x 2; posterior right innominate mobs; shotgun technique; MET for left/left sacral torsion   Rationale: decrease pain, increase ROM and increase tissue extensibility to increase ease of ADLs            With   [x] TE   [] TA   [] neuro   [] other: Patient Education: [x] Review HEP    [] Progressed/Changed HEP based on:   [] positioning   [] body mechanics   [] transfers   [] heat/ice application    [] other:      Other Objective/Functional Measures:   Educated on how core stability can also contribute to hip weakness and back instability; added TA holds, modified planks and hollow tuck holds  Added eccentric HSC while holding bridge on ball  Checked flexion versus extension to see if change in symptoms; minor pulling of hamstrings with flexion but pt was currently experiencing increased buttocks pain when checking; educated pt to try next time she has increased pain to see if there is a back component to her symptoms  Added butt burners for hip strengthening    Pain Level (0-10 scale) post treatment:  0/10    ASSESSMENT/Changes in Function:  Added core exercises due to continued pelvic obliquity and general instability. Progressed hip exercises per flow sheet with continued focus on eccentric hamstring activation. Pt continues with fluctuating pain levels in the proximal hamstrings but we unable to fully rule out back involvement. Will continue to progress both hip/core strength for decreased pain with sitting, walking and performing ADLs. Progress towards goals / Updated goals:  1. Patient will improve FOTO score by 14 points in order to demonstrate a significant improvement in function. 2. Patient will improve ambulation tolerance to 20 minutes in order to increase ease of community ambulation. 3. Patient will improve B hip adduction/extension strength to 4+/5 in order to increase ease of ambulation.      PLAN  [x]  Upgrade activities as tolerated     [x]  Continue plan of care  []  Update interventions per flow sheet       []  Discharge due to:_  []  Other:_      Cecille Levin, HAYLEE 9/29/2020  3:49 PM    Future Appointments   Date Time Provider Leanne Martin   9/29/2020  4:30 PM Kirsten Martinez [facility]   10/1/2020  6:00 PM Ramona Araiza PT [facility]

## 2020-10-01 ENCOUNTER — HOSPITAL ENCOUNTER (OUTPATIENT)
Dept: PHYSICAL THERAPY | Age: 65
Discharge: HOME | End: 2020-10-01
Payer: COMMERCIAL

## 2020-10-01 PROCEDURE — 97110 THERAPEUTIC EXERCISES: CPT

## 2020-10-01 NOTE — PROGRESS NOTES
PT DAILY TREATMENT NOTE 10-18    Patient Name: Joselin Goss  Date:10/1/2020  : 1955  [x]  Patient  Verified  Payor: Shauna Yen / Plan: [employer] / Product Type: HMO /    In time: 6:01  Out time:6: 42  Total Treatment Time (min): 41  Visit #: 3 of 8    Treatment Area: Strain of muscle, fascia and tendon of the posterior muscle group at thigh level, unspecified thigh, subsequent encounter [S76.869D]    SUBJECTIVE  Pain Level (0-10 scale):  3/10  Any medication changes, allergies to medications, adverse drug reactions, diagnosis change, or new procedure performed?: [x] No    [] Yes (see summary sheet for update)  Subjective functional status/changes:   [] No changes reported  Pt. Continues to report fluctuating symptoms. OBJECTIVE    41 min Therapeutic Exercise:  [x] See flow sheet :   Rationale: increase ROM and increase strength to improve the patients ability to increase ease of ADLs          With   [x] TE   [] TA   [] neuro   [] other: Patient Education: [x] Review HEP    [] Progressed/Changed HEP based on:   [] positioning   [] body mechanics   [] transfers   [] heat/ice application    [] other:      Other Objective/Functional Measures:   Hip ext MMT: right: 4/5 left: 4/5  Pt. Was challenged with 20# resistance with knee flexion  She requires cues to perform hip abduction correctly  Mild left hip upslip corrected with leg lengthening exercise     Pain Level (0-10 scale) post treatment: 0/10    ASSESSMENT/Changes in Function:  Pt. Is making slow progress towards goals. She continues to have decreased LE strength and decreased core stability. She has improving hamstring strength during exercises today.     Patient will continue to benefit from skilled PT services to modify and progress therapeutic interventions, address functional mobility deficits, address ROM deficits, address strength deficits, analyze and address soft tissue restrictions, analyze and cue movement patterns, analyze and modify body mechanics/ergonomics and assess and modify postural abnormalities to attain remaining goals. Progress towards goals / Updated goals:  1. Patient will improve FOTO score by 14 points in order to demonstrate a significant improvement in function. 2. Patient will improve ambulation tolerance to 20 minutes in order to increase ease of community ambulation. Pt. Continues to have fluctuating pain (10/1/20)  3. Patient will improve B hip adduction/extension strength to 4+/5 in order to increase ease of ambulation.   Not met: pt.  Continues to have decreased extension strength at 4/5 (10/1/20)      PLAN  []  Upgrade activities as tolerated     [x]  Continue plan of care  []  Update interventions per flow sheet       []  Discharge due to:_  []  Other:_      Jose Carlos Park PT 10/1/2020  8:07 AM    Future Appointments   Date Time Provider Leanne Martin   10/1/2020  6:00 PM Judit Osullivan, PT [facility]

## 2020-10-07 ENCOUNTER — HOSPITAL ENCOUNTER (OUTPATIENT)
Dept: PHYSICAL THERAPY | Age: 65
Discharge: HOME | End: 2020-10-07
Payer: COMMERCIAL

## 2020-10-07 PROCEDURE — 97110 THERAPEUTIC EXERCISES: CPT

## 2020-10-07 NOTE — PROGRESS NOTES
PT DAILY TREATMENT NOTE 10-18    Patient Name: Mady Pedraza  Date:10/7/2020  : 1955  [x]  Patient  Verified  Payor: Jm Durham / Plan: [employer] / Product Type: HMO /    In time: 5:15  Out time: 6:02  Total Treatment Time (min): 52  Visit #: 4 of 8    Treatment Area: Strain of muscle, fascia and tendon of the posterior muscle group at thigh level, unspecified thigh, subsequent encounter [S76.321Q]    SUBJECTIVE  Pain Level (0-10 scale):  3/10  Any medication changes, allergies to medications, adverse drug reactions, diagnosis change, or new procedure performed?: [x] No    [] Yes (see summary sheet for update)  Subjective functional status/changes:   [] No changes reported  Pt. Reports she continues to have fluctuating pain. She reports pain when first getting up to walk at work. OBJECTIVE    47 min Therapeutic Exercise:  [x] See flow sheet :   Rationale: increase ROM and increase strength to improve the patients ability to increase ease of ADLs          With   [x] TE   [] TA   [] neuro   [] other: Patient Education: [x] Review HEP    [] Progressed/Changed HEP based on:   [] positioning   [] body mechanics   [] transfers   [] heat/ice application    [] other:      Other Objective/Functional Measures:   Hip MMT abd: right: 4-/5 left: 4-/5   Pt. Has positive SLR bilaterally  No significant change in symptoms with repeated lumbar flexion or extension  She reports maybe some improvement after prone extensions  Increased pain in posterior leg with hip flexor stretching so she was educated on stopping these  She was educated on using a lumbar roll at work and to stand more frequently at work    Pain Level (0-10 scale) post treatment: 0/10    ASSESSMENT/Changes in Function:  Pt. Is making limited progress with physical therapy. She was educated on using lumbar roll and to add repeated prone extensions to HEP.  If she has relief with repeated extensions we will continue PT with change of exercises to focus more on her back. If she has no significant change we will D/C from PT secondary to plateau in progress. Patient will continue to benefit from skilled PT services to modify and progress therapeutic interventions, address functional mobility deficits, address ROM deficits, address strength deficits, analyze and address soft tissue restrictions, analyze and cue movement patterns, analyze and modify body mechanics/ergonomics and assess and modify postural abnormalities to attain remaining goals. Progress towards goals / Updated goals:  1. Patient will improve FOTO score by 14 points in order to demonstrate a significant improvement in function. 2. Patient will improve ambulation tolerance to 20 minutes in order to increase ease of community ambulation. Pt. Continues to have fluctuating pain (10/7/20)  3. Patient will improve B hip adduction/extension strength to 4+/5 in order to increase ease of ambulation.   Not met: pt.  Continues to have decreased extension strength at 4/5 (10/1/20)    PLAN  []  Upgrade activities as tolerated     [x]  Continue plan of care  []  Update interventions per flow sheet       []  Discharge due to:_  []  Other:_      Cristy Rachel, PT 10/7/2020  7:30 AM    Future Appointments   Date Time Provider Leanne Martin   10/7/2020  5:15 PM Gabriel Salgado, PT [facility]   10/13/2020  3:45 PM Cecile Escobar PTA [facility]

## 2020-10-13 ENCOUNTER — HOSPITAL ENCOUNTER (OUTPATIENT)
Dept: PHYSICAL THERAPY | Age: 65
Discharge: HOME | End: 2020-10-13
Payer: COMMERCIAL

## 2020-10-13 PROCEDURE — 97110 THERAPEUTIC EXERCISES: CPT

## 2020-10-13 NOTE — PROGRESS NOTES
PT DAILY TREATMENT NOTE 10-18    Patient Name: April Nolan  Date:10/13/2020  : 1955  [x]  Patient  Verified  Payor: Loren Hunt / Plan: [employer] / Product Type: HMO /    In time: 3:55   Out time: 4:35  Total Treatment Time (min): 40  Visit #: 5 of 8    Treatment Area: Strain of muscle, fascia and tendon of the posterior muscle group at thigh level, unspecified thigh, subsequent encounter [S76.933D]    SUBJECTIVE  Pain Level (0-10 scale): 3/10   Any medication changes, allergies to medications, adverse drug reactions, diagnosis change, or new procedure performed?: [x] No    [] Yes (see summary sheet for update)  Subjective functional status/changes:   [] No changes reported  Pt reports for the last 2 days she really hasn't had any pain. She feels some soreness in the back of her right leg right now. OBJECTIVE    40 min Therapeutic Exercise:  [x] See flow sheet :   Rationale: increase ROM and increase strength to improve the patients ability to increase ease of ADLs          With   [x] TE   [] TA   [] neuro   [] other: Patient Education: [x] Review HEP    [] Progressed/Changed HEP based on:   [] positioning   [] body mechanics   [] transfers   [] heat/ice application    [] other:      Other Objective/Functional Measures: In standing left lateral trunk lean with right knee held in flexion  No pain with prone exercises and prone props  Neutral spine in prone without lateral drift  Educated to continue with prone props as long as they are helping symptoms  Educated with mirror on correcting standing posture    Pain Level (0-10 scale) post treatment: 0/10    ASSESSMENT/Changes in Function: Pt with decreased symptoms performing prone props. She has a left lateral shift in standing and holds the right knee in slight flexion. She has had decreased pain the last 2 days. Will work on core and neutral spine while continuing to address B LE strength for ease of performing job duties and ADLs.      Patient will continue to benefit from skilled PT services to modify and progress therapeutic interventions, address functional mobility deficits, address ROM deficits, address strength deficits, analyze and address soft tissue restrictions, analyze and cue movement patterns, analyze and modify body mechanics/ergonomics and assess and modify postural abnormalities to attain remaining goals. Progress towards goals / Updated goals:  1. Patient will improve FOTO score by 14 points in order to demonstrate a significant improvement in function. 2. Patient will improve ambulation tolerance to 20 minutes in order to increase ease of community ambulation. Pt. Continues to have fluctuating pain (10/7/20)  3. Patient will improve B hip adduction/extension strength to 4+/5 in order to increase ease of ambulation.   Not met: pt.  Continues to have decreased extension strength at 4/5 (10/1/20)    PLAN  [x]  Upgrade activities as tolerated     [x]  Continue plan of care  []  Update interventions per flow sheet       []  Discharge due to:_  []  Other:_      Yoanna Jolley PTA 10/13/2020  7:30 AM    Future Appointments   Date Time Provider Leanne Martin   10/13/2020  3:45 PM Cy Steel PTA [facility]

## 2020-10-21 ENCOUNTER — HOSPITAL ENCOUNTER (OUTPATIENT)
Dept: PHYSICAL THERAPY | Age: 65
Discharge: HOME | End: 2020-10-21
Payer: COMMERCIAL

## 2020-10-21 PROCEDURE — 97530 THERAPEUTIC ACTIVITIES: CPT

## 2020-10-21 PROCEDURE — 97110 THERAPEUTIC EXERCISES: CPT

## 2020-10-21 NOTE — PROGRESS NOTES
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[employer], functionally independent    Visits from Start of Care: 19    Missed Visits: 1    Reporting Period : 2020 to 10/21/2020    Date:10/21/2020  : 1955  [x]  Patient  Verified  Payor: [employer] / Plan: [place] [employer] / Product Type: HMO /    In time:3:53   Out time: 4:40  Total Treatment Time (min): 52  Visit #: 6 of 8    SUBJECTIVE  Pain Level (0-10 scale): 3/10  Any medication changes, allergies to medications, adverse drug reactions, diagnosis change, or new procedure performed?: [x] No    [] Yes (see summary sheet for update)  Subjective functional status/changes:   [] No changes reported  Pt reports inconsistent pain levels in the back of her legs. She thinks overall she has had improvement but she procrastinates with exercising at home. She is unsure of her walking tolerance because it fluctuates. She notes improvement in pain if she leans forward on a shopping cart. She has noticed she bends forward more in general. She has to make a follow up with her PCP and has given permission to send her final note to both the referring MD and her PCP.      OBJECTIVE    22 min Therapeutic Exercise:  [x] See flow sheet :   Rationale: increase ROM and increase strength to improve the patients ability to improve ease of standing and walking    25 min Therapeutic Activity:  -  See flow sheet : education regarding spine of her posture; use of mirror to work on posture correction; FOTO reassessment Rationale: increase ROM, increase strength, improve coordination and improve balance  to improve the patients ability to understand how her back and posture can also contribute to hamstring pain and decreased standing/walking tolerance           With   [] TE   [] TA   [] neuro   [] other: Patient Education: [x] Review HEP    [] Progressed/Changed HEP based on:   [] positioning   [] body mechanics   [] transfers   [] heat/ice application    [] other:      Other Objective/Functional Measures: In standing pt has a left lateral trunk lean  Educated on hip shifts to work on correcting spine alignment  With right lateral flexion she has increased right hamstring pain at origin  With left lateral flexion she has increased left hamstring pain at origin  With standing trunk flexion she has relief of B hamstring pain  FOTO: 67  Ambulation tolerance continues to vary  Hip abduction MMT: right 4/5 left 4-/5  Hip extension MMT: B 4/5     Pain Level (0-10 scale) post treatment: 0/10    Summary of Care:  Goal: Patient will improve FOTO score by 14 points in order to demonstrate a significant improvement in function. Status at last note/certification: Goal met. Improved 17 points   Status at discharge: met    Goal: Patient will improve ambulation tolerance to 20 minutes in order to increase ease of community ambulation.   Status at last note/certification: fluctuates  Status at discharge: not met    Goal: Patient will improve B hip adduction/extension strength to 4+/5 in order to increase ease of ambulation.   Status at last note/certification: Hip MMT add right: 4-/5 left: 4-/5  ext: right: 4-/5 left: 4-/5  Status at discharge: not met    ASSESSMENT/Changes in Function: Pt made slow progress towards final goals in therapy. She continues with fluctuating pain levels in B hamstrings right>left and inconsistent ambulation tolerance.  In standing she does have a left lateral trunk lean and slight forward flexion which lessens her symptoms in her legs. She continues to demonstrate decreased hip strength and is semi-compliant with her HEP. She will D/C at this time as she is independent with her HEP and follow up with her PCP given change in symptoms that reflect some l/s involvement to her continued hamstring pain symptoms with sitting, standing and walking.      Thank you for this referral!      PLAN  [x]Discontinue therapy: [x]Patient has reached or is progressing toward set goals      []Patient is non-compliant or has abdicated      []Due to lack of appreciable progress towards set Trma 296, PTA 10/21/2020  5:07 PM

## 2020-11-24 ENCOUNTER — APPOINTMENT (OUTPATIENT)
Dept: PHYSICAL THERAPY | Age: 65
End: 2020-11-24

## 2020-12-01 ENCOUNTER — HOSPITAL ENCOUNTER (OUTPATIENT)
Dept: PHYSICAL THERAPY | Age: 65
Discharge: HOME | End: 2020-12-01
Payer: COMMERCIAL

## 2020-12-01 PROCEDURE — 97110 THERAPEUTIC EXERCISES: CPT

## 2020-12-01 PROCEDURE — 97140 MANUAL THERAPY 1/> REGIONS: CPT

## 2020-12-01 PROCEDURE — 97535 SELF CARE MNGMENT TRAINING: CPT

## 2020-12-01 PROCEDURE — 97162 PT EVAL MOD COMPLEX 30 MIN: CPT

## 2020-12-01 NOTE — PROGRESS NOTES
PT DAILY TREATMENT NOTE  10-18    Patient Name: Nate Favorite  Date:2020  : 1955  [x]  Patient  Verified  Payor: Nita Cummings / Plan: [address] [place] [address] Laz[facility] PT / Product Type: [employer] /    In time: 9:06  Out time: 9:52  Total Treatment Time (min): 46  Visit #: 1 of 10    Treatment Area: Low back pain [M54.5]    SUBJECTIVE  Pain Level (0-10 scale): 2  Any medication changes, allergies to medications, adverse drug reactions, diagnosis change, or new procedure performed?: [x] No    [] Yes (see summary sheet for update)  Subjective functional status/changes:   [] No changes reported  See POC    OBJECTIVE    18 min [x]Eval                  []Re-Eval     10 min Therapeutic Exercise:  [] See flow sheet : HEP instruction and demonstration   Rationale: increase ROM and increase strength to improve the patients ability to tolerate ADLs    10 min Self Care/Home Management: []  See flow sheet : pt education regarding anatomy and physiology of the LEs/spine/SIJ and how it relates to the pt's condition, pt education on how a leg length difference can affect her pain and the possibility of getting a heel lift in the future in the left shoe if the leg length does not resolve. Rationale: increase ROM, increase strength and decrease pain/symptoms  to improve the patients ability to tolerate functional tasks. 8 min Manual Therapy: In supine: MET to correct right anterior and left posterior innominate, left LE pull to improve left innominate distraction; in right s/l: MET to correct sacral torsion, pelvic alignment and leg length assessments. The manual therapy interventions were performed at a separate and distinct time from the therapeutic activities interventions. Rationale: decrease pain, increase ROM, increase tissue extensibility and increase postural awareness to improve activity tolerance. With   [x] TE   [] TA   [] neuro   [x] Other: Self Care/Home management and manual therapy.  Patient Education: [x] Review HEP    [] Progressed/Changed HEP based on:   [] positioning   [] body mechanics   [] transfers   [] heat/ice application    [] other:      Other Objective/Functional Measures: See evaluation. Pain Level (0-10 scale) post treatment: 2    ASSESSMENT/Changes in Function: Pt given HEP handout to perform. Pt understood exercises in HEP handout. Improvement in pelvic alignment and SIJ mobility noted post manual interventions. Leg length did improve as well but the pt's left LE was still shorter than the right after manual interventions. Pt demonstrated decreased AROM, decreased strength, muscle tightness, and impaired pelvic alignment. Pt would benefit from physical therapy to improve the above impairments to help the pt return to performing ADLs, functional and work activities. Patient will continue to benefit from skilled PT services to modify and progress therapeutic interventions, address functional mobility deficits, address ROM deficits, address strength deficits, analyze and address soft tissue restrictions, analyze and cue movement patterns, analyze and modify body mechanics/ergonomics, assess and modify postural abnormalities, address imbalance/dizziness and instruct in home and community integration to attain remaining goals. [x]  See Plan of Care  []  See progress note/recertification  []  See Discharge Summary         Progress towards goals / Updated goals:  Short Term Goals: To be accomplished in 2 treatments:  1. Pt will report compliance and independence to HEP to help the pt manage their pain and symptoms. Eval: established   Long Term Goals: To be accomplished in 10 treatments:  1. Pt will increase FOTO score to 66 points to improve ability to perform ADLs. Eval: 61 points  2. Pt will increase MMT B hip IR/ER to 4/5 to improve ability to lift and carry objects with more ease. Eval: 4-/5 for all  3.  Pt will increase AROM l/s EXT to 25-50% of WNL with minimal to no increased B LE pain to improve ability to stand with improved posture and with less pain. Eval: 0-25% of WNL with increased B posterior LE pain. 4. Pt will report having minimal to no increased LBP or B LE pain with prolonged sitting at work and with sit to stands to improve ability to perform work activities. Eval: reported having increased pain with prolonged sitting and sit to stands at work.     PLAN  [x]  Upgrade activities as tolerated     [x]  Continue plan of care  [x]  Update interventions per flow sheet       []  Discharge due to:_  []  Other:_      Dunia Goldberg, PT 12/1/2020  11:15 AM    Future Appointments   Date Time Provider Leanne Martin   12/4/2020  3:45 PM Christiane Cox [facility]   12/8/2020  2:15 PM Ina Lara PT [facility]   12/10/2020  3:45 PM Felicia Mccall PTA [facility]

## 2020-12-01 NOTE — PROGRESS NOTES
[facility] [address] [id number] [facility], [address] [place] [address], [address] [place], [place] [address],[address]  [phone] fax    Plan of Care/ Statement of Necessity for Physical Therapy Services  Patient name: Rebeca Moran Start of Care: 2020   Referral source: Carl Linares DO : 1955    Medical Diagnosis: Low back pain [M54.5]  Payor: Maria D Gilmore / Plan: [address] PT / Product Type: Commerical /  Onset Date: about 2 years ago    Treatment Diagnosis: LBP, B posterior thigh pain   Prior Hospitalization: see medical history Provider#: [id number]   Medications: Verified on Patient summary List    Comorbidities: depression, visual impaired, osteoporosis, hx 2 c-sections  and    Prior Level of Function: independent with ADLs, functional, and work activities with no limitations. The Plan of Care and following information is based on the information from the initial evaluation. Assessment/ key information:   Pt is a 63year old female who presents to therapy today with LBP and B posterior thigh pain. Pt states that her initial symptoms began about 2 years ago when she was in a loaded flexed position during intercourse. The pt was seen for therapy earlier this year at our [facility] location for B hamstring pain/weakness. Pt reports she noticed improvement in her symptoms with therapy interventions but the pain did not completely resolve. She was d/c'ed and was sent back to her MD to rule out lumbar involvement. Pt denies any new injury or trauma. Pt reports she has been doing some of the HEP exercises but continues to have pain. She states her back pain is intermittent and sometimes feels it with prolonged sitting. She reports having increased B LE pain with lifting/carrying, standing up straight, and with sit to stands. She denies any numbness in the LEs. Pt demonstrated decreased AROM, decreased strength, muscle tightness, and impaired pelvic alignment.  Pt would benefit from physical therapy to improve the above impairments to help the pt return to performing ADLs, functional and work activities. Evaluation Complexity History MEDIUM  Complexity : 1-2 comorbidities / personal factors will impact the outcome/ POC ; Examination HIGH Complexity : 4+ Standardized tests and measures addressing body structure, function, activity limitation and / or participation in recreation  ;Presentation MEDIUM Complexity : Evolving with changing characteristics  ; Clinical Decision Making MEDIUM Complexity : FOTO score of 26-74  Overall Complexity Rating: MEDIUM  Problem List: pain affecting function, decrease ROM, decrease strength, impaired gait/ balance, decrease ADL/ functional abilitiies, decrease activity tolerance, decrease flexibility/ joint mobility and decrease transfer abilities   Treatment Plan may include any combination of the following: Therapeutic exercise, Therapeutic activities, Neuromuscular re-education, Physical agent/modality, Gait/balance training, Manual therapy, Patient education, Self Care training, Functional mobility training, Home safety training and Stair training  Patient / Family readiness to learn indicated by: asking questions, trying to perform skills and interest  Persons(s) to be included in education: patient (P)  Barriers to Learning/Limitations: None  Patient Goal (s): able to stand up straight, stop leaning to left, pain in legs to go away  Patient Self Reported Health Status: good  Rehabilitation Potential: good    Short Term Goals: To be accomplished in 2 treatments:  1. Pt will report compliance and independence to [facility] to help the pt manage their pain and symptoms. Eval: established   Long Term Goals: To be accomplished in 10 treatments:  1. Pt will increase FOTO score to 66 points to improve ability to perform ADLs. Eval: 61 points  2. Pt will increase MMT B hip IR/ER to 4/5 to improve ability to lift and carry objects with more ease.   Eval: 4-/5 for all  3. Pt will increase AROM l/s EXT to 25-50% of WNL with minimal to no increased B LE pain to improve ability to stand with improved posture and with less pain. Eval: 0-25% of WNL with increased B posterior LE pain. 4. Pt will report having minimal to no increased LBP or B LE pain with prolonged sitting at work and with sit to stands to improve ability to perform work activities. Eval: reported having increased pain with prolonged sitting and sit to stands at work. Frequency / Duration: Patient to be seen 2 times per week for 10 treatments. Patient/ Caregiver education and instruction: Diagnosis, prognosis, self care, activity modification and exercises   [x]  Plan of care has been reviewed with HAYLEE Fajardo, PT 12/1/2020 11:15 AM  _____________________________________________________________________  I certify that the above Therapy Services are being furnished while the patient is under my care. I agree with the treatment plan and certify that this therapy is necessary.     Physician's Signature:____________________  Date:__________Time:______    Please sign and return to [facility] [address] [place] [address], [address] [place] [address],[address] [address]  [phone] fax

## 2020-12-04 ENCOUNTER — HOSPITAL ENCOUNTER (OUTPATIENT)
Dept: PHYSICAL THERAPY | Age: 65
Discharge: HOME | End: 2020-12-04
Payer: COMMERCIAL

## 2020-12-04 PROCEDURE — 97140 MANUAL THERAPY 1/> REGIONS: CPT

## 2020-12-04 PROCEDURE — 97112 NEUROMUSCULAR REEDUCATION: CPT

## 2020-12-04 PROCEDURE — 97110 THERAPEUTIC EXERCISES: CPT

## 2020-12-04 NOTE — PROGRESS NOTES
PT DAILY TREATMENT NOTE     Patient Name: Junito Zavala  Date:2020  : 1955  [x]  Patient  Verified  Payor: Micky Lindquist / Plan: [employer] PT / Product Type: Commerical /    In time: 3:50  Out time:445  Total Treatment Time (min): 55  Visit #: 2 of 10    Treatment Area: Low back pain [M54.5]    SUBJECTIVE  Pain Level (0-10 scale): 3  Any medication changes, allergies to medications, adverse drug reactions, diagnosis change, or new procedure performed?: [x] No    [] Yes (see summary sheet for update)  Subjective functional status/changes:   [] No changes reported  \"I don't know, I was in some pain earlier (about a 4-5), but now just a little bit. \"    OBJECTIVE    Modality rationale: decrease pain and increase tissue extensibility to improve the patients ability to  tolerate exercises and return to daily activity with ease.    Min Type Additional Details    [] Estim:  []Unatt       []IFC  []Premod                        []Other:  []w/ice   []w/heat  Position:  Location:    [] Estim: []Att    []TENS instruct  []NMES                    []Other:  []w/US   []w/ice   []w/heat  Position:  Location:    []  Traction: [] Cervical       []Lumbar                       [] Prone          []Supine                       []Intermittent   []Continuous Lbs:  [] before manual  [] after manual    []  Ultrasound: []Continuous   [] Pulsed                           []1MHz   []3MHz W/cm2:  Location:    []  Iontophoresis with dexamethasone         Location: [] Take home patch   [] In clinic   10 []  Ice     [x]  heat  []  Ice massage  []  Laser   []  Anodyne Position:   Location: low back    []  Laser with stim  []  Other:  Position:  Location:    []  Vasopneumatic Device Pressure:       [] lo [] med [] hi   Temperature: [] lo [] med [] hi   [] Skin assessment post-treatment:  []intact []redness- no adverse reaction    []redness  adverse reaction:      15 min Therapeutic Exercise:  [x] See flow sheet :   Rationale: increase ROM and increase strength to improve the patients ability to perform ADLs with ease      15 min Neuromuscular Re-education:  []  See flow sheet :   Rationale: improve coordination and increase proprioception  to improve the patients ability to stabilize, use proper body mechanics, and promote proper postural awareness    15 min Manual Therapy: In supine: MET to correct right anterior and left posterior innominate, left LE pull to improve left innominate distraction   The manual therapy interventions were performed at a separate and distinct time from the therapeutic activities interventions. Rationale: decrease pain, increase ROM and increase postural awareness to to increase mobility and reduce restriction with ADLs        With   [] TE   [] TA   [] neuro   [x] other: Patient Education: [x] Review HEP    [] Progressed/Changed HEP based on:   [] positioning   [] body mechanics   [] transfers   [] heat/ice application    [x] other: self MET for innominate rotation     Other Objective/Functional Measures:   Initiated POC   Reviewed HEP for prone quad stretch with strap for better understanding     Pain Level (0-10 scale) post treatment: 3    ASSESSMENT/Changes in Function: Patient tolerated initial therapy session well with no increase in pain or symptoms post treatment. She presented with left upslip with posterior innom rotation that was corrected with MET. Also, demonstrated with pt how to perform self MET for rotation. Patient will continue to benefit from skilled PT services to modify and progress therapeutic interventions, address functional mobility deficits, address ROM deficits, address strength deficits, analyze and address soft tissue restrictions, analyze and cue movement patterns, analyze and modify body mechanics/ergonomics and assess and modify postural abnormalities to attain remaining goals.      [x]  See Plan of Care  []  See progress note/recertification  []  See Discharge Summary Progress towards goals / Updated goals:  Short Term Goals: To be accomplished in 2 treatments:  1. Pt will report compliance and independence to HEP to help the pt manage their pain and symptoms.             Eval: established   Long Term Goals: To be accomplished in 10 treatments:  1. Pt will increase FOTO score to 66 points to improve ability to perform ADLs. Eval: 61 points  2. Pt will increase MMT B hip IR/ER to 4/5 to improve ability to lift and carry objects with more ease. Eval: 4-/5 for all  3. Pt will increase AROM l/s EXT to 25-50% of WNL with minimal to no increased B LE pain to improve ability to stand with improved posture and with less pain. Eval: 0-25% of WNL with increased B posterior LE pain. 4. Pt will report having minimal to no increased LBP or B LE pain with prolonged sitting at work and with sit to stands to improve ability to perform work activities.   Eval: reported having increased pain with prolonged sitting and sit to stands at work.     PLAN  [x]  Upgrade activities as tolerated     [x]  Continue plan of care  []  Update interventions per flow sheet       []  Discharge due to:_  []  Other:_      Memory EMERSON Renae 12/4/2020  3:56 PM    Future Appointments   Date Time Provider Leanne Martin   12/8/2020  2:15 PM Smooth Vyas, PT [facility]   12/10/2020  3:45 PM Felicia Mccall, PTA [facility]

## 2020-12-08 ENCOUNTER — HOSPITAL ENCOUNTER (OUTPATIENT)
Dept: PHYSICAL THERAPY | Age: 65
Discharge: HOME | End: 2020-12-08
Payer: COMMERCIAL

## 2020-12-08 PROCEDURE — 97112 NEUROMUSCULAR REEDUCATION: CPT

## 2020-12-08 PROCEDURE — 97110 THERAPEUTIC EXERCISES: CPT

## 2020-12-08 PROCEDURE — 97140 MANUAL THERAPY 1/> REGIONS: CPT

## 2020-12-08 NOTE — PROGRESS NOTES
PT DAILY TREATMENT NOTE     Patient Name: Judge Carney  Date:2020  : 1955  [x]  Patient  Verified  Payor: Elsie Tomas / [place]: [employer] PT / Product Type: Commerical /    In time: 2:22   Out time: 3:14  Total Treatment Time (min): 52  Visit #: 3 of 10    Treatment Area: Low back pain [M54.5]    SUBJECTIVE  Pain Level (0-10 scale): 2-3  Any medication changes, allergies to medications, adverse drug reactions, diagnosis change, or new procedure performed?: [x] No    [] Yes (see summary sheet for update)  Subjective functional status/changes:   [] No changes reported  Pt reports having some B HS pain today. OBJECTIVE    Modality rationale: decrease pain and increase tissue extensibility to improve the patients ability to perform ADLs.    Min Type Additional Details    [] Estim:  []Unatt       []IFC  []Premod                        []Other:  []w/ice   []w/heat  Position:  Location:    [] Estim: []Att    []TENS instruct  []NMES                    []Other:  []w/US   []w/ice   []w/heat  Position:  Location:    []  Traction: [] Cervical       []Lumbar                       [] Prone          []Supine                       []Intermittent   []Continuous Lbs:  [] before manual  [] after manual    []  Ultrasound: []Continuous   [] Pulsed                           []1MHz   []3MHz W/cm2:  Location:    []  Iontophoresis with dexamethasone         Location: [] Take home patch   [] In clinic   10 []  Ice     [x]  heat  []  Ice massage  []  Laser   []  Anodyne Position: prone   Location: low back, B HS and glutes    []  Laser with stim  []  Other:  Position:  Location:    []  Vasopneumatic Device Pressure:       [] lo [] med [] hi   Temperature: [] lo [] med [] hi   [] Skin assessment post-treatment:  []intact []redness- no adverse reaction    []redness  adverse reaction:      10 min Therapeutic Exercise:  [x] See flow sheet :   Rationale: increase ROM and increase strength to improve the patients ability to perform ADLs      24 min Neuromuscular Re-education:  [x]  See flow sheet : core and glute stability exercises,    Rationale: increase strength, improve coordination and increase proprioception  to improve the patients ability to tolerate functional tasks. 8 min Manual Therapy: In supine: left LE pull to improve left innominate upslip, shotgun technique; in right s/l: MET to correct sacral torsion   The manual therapy interventions were performed at a separate and distinct time from the therapeutic activities interventions. Rationale: decrease pain, increase ROM and increase postural awareness to improve activity tolerance. With   [x] TE   [] TA   [x] neuro   [x] other: manual therapy Patient Education: [x] Review HEP    [] Progressed/Changed HEP based on:   [] positioning   [] body mechanics   [] transfers   [] heat/ice application    [x] other:      Other Objective/Functional Measures: Improvement in pelvic alignment noted post manual interventions. Minimal difference in leg length noted post manual interventions as well. Pain Level (0-10 scale) post treatment: 2    ASSESSMENT/Changes in Function: Reported minimal change in pain post session today. Pt demonstrates improvements in leg length with manual interventions today and has improved since the initial evaluation. Pt reported having some soreness in the B HS with norah RDLs DL. She continues to report having pain and soreness in the B HS and distal glutes with ADLs and with mobility. Continue POC as tolerated to improve pain and tightness.      Patient will continue to benefit from skilled PT services to modify and progress therapeutic interventions, address functional mobility deficits, address ROM deficits, address strength deficits, analyze and address soft tissue restrictions, analyze and cue movement patterns, analyze and modify body mechanics/ergonomics, assess and modify postural abnormalities and instruct in home and community integration to attain remaining goals. []  See Plan of Care  []  See progress note/recertification  []  See Discharge Summary         Progress towards goals / Updated goals:  Short Term Goals: To be accomplished in 2 treatments:  1. Pt will report compliance and independence to HEP to help the pt manage their pain and symptoms.             Eval: established   Long Term Goals: To be accomplished in 10 treatments:  1. Pt will increase FOTO score to 66 points to improve ability to perform ADLs. Eval: 61 points  2. Pt will increase MMT B hip IR/ER to 4/5 to improve ability to lift and carry objects with more ease. Eval: 4-/5 for all  3. Pt will increase AROM l/s EXT to 25-50% of WNL with minimal to no increased B LE pain to improve ability to stand with improved posture and with less pain. Eval: 0-25% of WNL with increased B posterior LE pain. 4. Pt will report having minimal to no increased LBP or B LE pain with prolonged sitting at work and with sit to stands to improve ability to perform work activities.   Eval: reported having increased pain with prolonged sitting and sit to stands at work.   Current: continues to have pain with sitting 12/8/2020    PLAN  [x]  Upgrade activities as tolerated     [x]  Continue plan of care  [x]  Update interventions per flow sheet       []  Discharge due to:_  []  Other:_      Dana Muse, PT 12/8/2020  2:38 PM    Future Appointments   Date Time Provider Leanne Martin   12/10/2020  3:45 PM Duyen Larson PTA [facility]

## 2020-12-10 ENCOUNTER — HOSPITAL ENCOUNTER (OUTPATIENT)
Dept: PHYSICAL THERAPY | Age: 65
Discharge: HOME | End: 2020-12-10
Payer: COMMERCIAL

## 2020-12-10 PROCEDURE — 97110 THERAPEUTIC EXERCISES: CPT

## 2020-12-10 PROCEDURE — 97140 MANUAL THERAPY 1/> REGIONS: CPT

## 2020-12-10 PROCEDURE — 97112 NEUROMUSCULAR REEDUCATION: CPT

## 2020-12-10 NOTE — PROGRESS NOTES
PT DAILY TREATMENT NOTE     Patient Name: Geovanni Lauren  Date:12/10/2020  : 1955  [x]  Patient  Verified  Payor: Roxane Rojas / [place]: [employer] / Product Type: Commerical /    In time: 3:45 Out time:4:28  Total Treatment Time (min): 45  Visit #: 4 of 10    Medicare/BCBS Only   Total Timed Codes (min):   1:1 Treatment Time:         Treatment Area: Low back pain [M54.5]    SUBJECTIVE  Pain Level (0-10 scale): 2-3  Any medication changes, allergies to medications, adverse drug reactions, diagnosis change, or new procedure performed?: [x] No    [] Yes (see summary sheet for update)  Subjective functional status/changes:   [] No changes reported  \"still some pain. \"    OBJECTIVE    Modality rationale: decrease edema, decrease inflammation, decrease pain, increase tissue extensibility and increase muscle contraction/control to improve the patients ability to perform ADL    Min Type Additional Details    [] Estim:  []Unatt       []IFC  []Premod                        []Other:  []w/ice   []w/heat  Position:  Location:    [] Estim: []Att    []TENS instruct  []NMES                    []Other:  []w/US   []w/ice   []w/heat  Position:  Location:    []  Traction: [] Cervical       []Lumbar                       [] Prone          []Supine                       []Intermittent   []Continuous Lbs:  [] before manual  [] after manual    []  Ultrasound: []Continuous   [] Pulsed                           []1MHz   []3MHz W/cm2:  Location:    []  Iontophoresis with dexamethasone         Location: [] Take home patch   [] In clinic   10 []  Ice     [x]  heat  []  Ice massage  []  Laser   []  Anodyne Position:long sit  Location:(B) LSP    []  Laser with stim  []  Other:  Position:  Location:    []  Vasopneumatic Device Pressure:       [] lo [] med [] hi   Temperature: [] lo [] med [] hi   [x] Skin assessment post-treatment:  [x]intact []redness- no adverse reaction    []redness  adverse reaction:      min []Eval []Re-Eval       17 min Therapeutic Exercise:  [x] See flow sheet :   Rationale: increase ROM and increase strength to improve the patients ability to perform ADL      min Therapeutic Activity:  [x]  See flow sheet :   Rationale: increase ROM, increase strength and improve coordination  to improve the patients ability to perform ADL      10 min Neuromuscular Re-education:  []  See flow sheet :   Rationale: increase ROM, increase strength, improve coordination, improve balance and increase proprioception  to improve the patients ability to perform ADL     8 min Manual Therapy:  Checked alignment: left leg distraction  supine   The manual therapy interventions were performed at a separate and distinct time from the therapeutic activities interventions. Rationale: decrease pain, increase ROM, increase tissue extensibility, decrease edema , decrease trigger points and increase postural awareness to perform ADL      min Gait Training:  ___ feet with ___ device on level surfaces with ___ level of assist   Rationale: With   [x] TE   [] TA   [] neuro   [] other: Patient Education: [x] Review HEP    [] Progressed/Changed HEP based on:   [] positioning   [] body mechanics   [] transfers   [] heat/ice application    [] other:      Other Objective/Functional Measures:  Left innominate posterior rotated    Pain Level (0-10 scale) post treatment: 0    ASSESSMENT/Changes in Function: Pt completed each there ex fairly well with cues. followng manual pain was resolved discussed with pt on importance of performing HEP 2x day.     Patient will continue to benefit from skilled PT services to address functional mobility deficits, address ROM deficits, address strength deficits, analyze and address soft tissue restrictions, analyze and cue movement patterns, analyze and modify body mechanics/ergonomics, assess and modify postural abnormalities and instruct in home and community integration to attain remaining goals. [x]  See Plan of Care  []  See progress note/recertification  []  See Discharge Summary         Progress towards goals / Updated goals:  1. Pt will report compliance and independence to HEP to help the pt manage their pain and symptoms.             Eval: established   Progressing 12/10  Long Term Goals: To be accomplished in 10 treatments:  1. Pt will increase FOTO score to 66 points to improve ability to perform ADLs. Eval: 61 points  2. Pt will increase MMT B hip IR/ER to 4/5 to improve ability to lift and carry objects with more ease. Eval: 4-/5 for all  3. Pt will increase AROM l/s EXT to 25-50% of WNL with minimal to no increased B LE pain to improve ability to stand with improved posture and with less pain. Eval: 0-25% of WNL with increased B posterior LE pain. 4. Pt will report having minimal to no increased LBP or B LE pain with prolonged sitting at work and with sit to stands to improve ability to perform work activities.   Eval: reported having increased pain with prolonged sitting and sit to stands at work.   Current: continues to have pain with sitting 12/8/2020       PLAN  [x]  Upgrade activities as tolerated     []  Continue plan of care  []  Update interventions per flow sheet       []  Discharge due to:_  []  Other:_      Felicia Mccall, HAYLEE 12/10/2020  3:46 PM    Future Appointments   Date Time Provider Leanne Martin   12/14/2020  3:45 PM Merpablo Matutet [facility]   12/18/2020  4:30 PM Meriajade Matutet [facility]   12/22/2020 11:15 AM Yonathan Hinson, PT [facility]

## 2020-12-14 ENCOUNTER — HOSPITAL ENCOUNTER (OUTPATIENT)
Dept: PHYSICAL THERAPY | Age: 65
Discharge: HOME | End: 2020-12-14
Payer: COMMERCIAL

## 2020-12-14 PROCEDURE — 97112 NEUROMUSCULAR REEDUCATION: CPT

## 2020-12-14 PROCEDURE — 97110 THERAPEUTIC EXERCISES: CPT

## 2020-12-14 PROCEDURE — 97530 THERAPEUTIC ACTIVITIES: CPT

## 2020-12-14 NOTE — PROGRESS NOTES
PT DAILY TREATMENT NOTE     Patient Name: Tamar Baires  Date:2020  : 1955  [x]  Patient  Verified  Payor: Kasandra Galvan / Plan: [address] PT / Product Type: Commerical /    In time: 3:50  Out time: 4:42  Total Treatment Time (min): 52  Visit #: 5 of 10    Treatment Area: Low back pain [M54.5]    SUBJECTIVE  Pain Level (0-10 scale): 2  Any medication changes, allergies to medications, adverse drug reactions, diagnosis change, or new procedure performed?: [x] No    [] Yes (see summary sheet for update)  Subjective functional status/changes:   [] No changes reported   \"It (low back) didn't hurt as much today at work. \"    OBJECTIVE    Modality rationale: decrease inflammation, decrease pain and increase tissue extensibility to improve the patients ability to  tolerate exercises and return to daily activity with ease.    Min Type Additional Details    [] Estim:  []Unatt       []IFC  []Premod                        []Other:  []w/ice   []w/heat  Position:  Location:    [] Estim: []Att    []TENS instruct  []NMES                    []Other:  []w/US   []w/ice   []w/heat  Position:  Location:    []  Traction: [] Cervical       []Lumbar                       [] Prone          []Supine                       []Intermittent   []Continuous Lbs:  [] before manual  [] after manual    []  Ultrasound: []Continuous   [] Pulsed                           []1MHz   []3MHz W/cm2:  Location:    []  Iontophoresis with dexamethasone         Location: [] Take home patch   [] In clinic   10 []  Ice     [x]  heat  []  Ice massage  []  Laser   []  Anodyne Position: long sit  Location: low back    []  Laser with stim  []  Other:  Position:  Location:    []  Vasopneumatic Device Pressure:       [] lo [] med [] hi   Temperature: [] lo [] med [] hi   [] Skin assessment post-treatment:  []intact []redness- no adverse reaction    []redness  adverse reaction:     16 min Therapeutic Exercise:  [x] See flow sheet :   Rationale: increase ROM and increase strength to improve the patients ability to perform ADLs with ease    14 min Therapeutic Activity:  [x]  See flow sheet :   Rationale: increase strength, improve coordination and increase proprioception  to improve the patients ability to  perform daily tasks and return to PLOF     12 min Neuromuscular Re-education:  [x]  See flow sheet :   Rationale: improve coordination and increase proprioception  to improve the patients ability to stabilize, use proper body mechanics, and promote proper postural awareness           With   [x] TE   [x] TA   [x] neuro   [] other: Patient Education: [x] Review HEP    [] Progressed/Changed HEP based on:   [x] positioning   [x] body mechanics   [] transfers   [] heat/ice application    [] other:      Other Objective/Functional Measures:   Initiated clamshell exercise x3   Added OTB to knee fallouts for resistance strengthening   Demo'd for pt self correction for leg length discrepancy; she was able to demo and verbalized understanding    Pain Level (0-10 scale) post treatment: 1    ASSESSMENT/Changes in Function: Patient is progressing well with exercises and reporting less symptoms throughout the workday. She is demonstrating improvement with form and in core strength when performing therex; and did well with progressed and newly introduced strengthening exercises. Patient demo'd self MET with successful adjustment in pelvic alignment. Patient will continue to benefit from skilled PT services to modify and progress therapeutic interventions, address functional mobility deficits, address ROM deficits, address strength deficits, analyze and address soft tissue restrictions, analyze and cue movement patterns, analyze and modify body mechanics/ergonomics and assess and modify postural abnormalities to attain remaining goals.      [x]  See Plan of Care  []  See progress note/recertification  []  See Discharge Summary         Progress towards goals / Updated goals: 1. Pt will report compliance and independence to HEP to help the pt manage their pain and symptoms.             Eval: established   Progressing 12/10  Long Term Goals: To be accomplished in 10 treatments:  1. Pt will increase FOTO score to 66 points to improve ability to perform ADLs. Eval: 61 points  2. Pt will increase MMT B hip IR/ER to 4/5 to improve ability to lift and carry objects with more ease. Eval: 4-/5 for all  3. Pt will increase AROM l/s EXT to 25-50% of WNL with minimal to no increased B LE pain to improve ability to stand with improved posture and with less pain. Eval: 0-25% of WNL with increased B posterior LE pain. 4. Pt will report having minimal to no increased LBP or B LE pain with prolonged sitting at work and with sit to stands to improve ability to perform work activities.   Eval: reported having increased pain with prolonged sitting and sit to stands at work.   Current: continues to have pain with sitting 12/8/2020    PLAN  [x]  Upgrade activities as tolerated     [x]  Continue plan of care  []  Update interventions per flow sheet       []  Discharge due to:_  []  Other:_      EMERSON Segura 12/14/2020  4:06 PM    Future Appointments   Date Time Provider Leanne Martin   12/18/2020  4:30 PM Nenita Steiner [facility]   12/22/2020 11:15 AM Neyda Hinson, PT [facility]

## 2020-12-18 ENCOUNTER — APPOINTMENT (OUTPATIENT)
Dept: PHYSICAL THERAPY | Age: 65
End: 2020-12-18
Payer: COMMERCIAL

## 2020-12-22 ENCOUNTER — APPOINTMENT (OUTPATIENT)
Dept: PHYSICAL THERAPY | Age: 65
End: 2020-12-22
Payer: COMMERCIAL

## 2020-12-30 ENCOUNTER — HOSPITAL ENCOUNTER (OUTPATIENT)
Dept: PHYSICAL THERAPY | Age: 65
Discharge: HOME | End: 2020-12-30
Payer: COMMERCIAL

## 2020-12-30 PROCEDURE — 97535 SELF CARE MNGMENT TRAINING: CPT

## 2020-12-30 PROCEDURE — 97112 NEUROMUSCULAR REEDUCATION: CPT

## 2020-12-30 PROCEDURE — 97530 THERAPEUTIC ACTIVITIES: CPT

## 2020-12-30 PROCEDURE — 97110 THERAPEUTIC EXERCISES: CPT

## 2020-12-30 NOTE — PROGRESS NOTES
PT DAILY TREATMENT NOTE     Patient Name: Maddie Deras  Date:2020  : 1955  [x]  Patient  Verified  Payor: Esdras Hough / [facility] Product Type: Commerical /    In time:515  Out time:629  Total Treatment Time (min): 74  Visit #: 6 of 10    Treatment Area: Low back pain [M54.5]    SUBJECTIVE  Pain Level (0-10 scale): 2-3  Any medication changes, allergies to medications, adverse drug reactions, diagnosis change, or new procedure performed?: [x] No    [] Yes (see summary sheet for update)  Subjective functional status/changes:   [] No changes reported  Pt reports she was feeling better until she went back to work. Pt reports she is getting cramping in calves at night. She has been doing some of the HEP. OBJECTIVE    Modality rationale: decrease pain and increase tissue extensibility to improve the patients ability to complete functional activities.    Min Type Additional Details    [] Estim:  []Unatt       []IFC  []Premod                        []Other:  []w/ice   []w/heat  Position:  Location:    [] Estim: []Att    []TENS instruct  []NMES                    []Other:  []w/US   []w/ice   []w/heat  Position:  Location:    []  Traction: [] Cervical       []Lumbar                       [] Prone          []Supine                       []Intermittent   []Continuous Lbs:  [] before manual  [] after manual    []  Ultrasound: []Continuous   [] Pulsed                           []1MHz   []3MHz W/cm2:  Location:    []  Iontophoresis with dexamethasone         Location: [] Take home patch   [] In clinic   10 []  Ice     [x]  heat  []  Ice massage  []  Laser   []  Anodyne Position: supine  Location: B glutes/hamstrings    []  Laser with stim  []  Other:  Position:  Location:    []  Vasopneumatic Device Pressure:       [] lo [] med [] hi   Temperature: [] lo [] med [] hi   [x] Skin assessment post-treatment:  [x]intact []redness- no adverse reaction    []redness  adverse reaction:     24 min Therapeutic Exercise:  [x] See flow sheet :   Rationale: increase ROM, increase strength and updated HEP to improve the patients ability to complete functional activities. 10 min Therapeutic Activity:  [x]  See flow sheet :   Rationale: increase ROM, increase strength, improve coordination, increase proprioception and patient education  to improve the patients ability to complete job related tasks. 20 min Neuromuscular Re-education:  [x]  See flow sheet :   Rationale: increase strength, improve coordination and increase proprioception  to improve the patients motor control and proprioceptive input to increase ability to activate correct muscles when performing functional task. 10 min Self care: educated pt on postural awareness techniques such as lumbar roll and stretch breaks to help with sitting tolerance at work, education on heat for pain management, education on breaking up HEP throughout day to help with tightness   Rationale: to improve abilities to complete household chores          With   [x] TE   [x] TA   [x] neuro   [] other: Patient Education: [x] Review HEP    [] Progressed/Changed HEP based on:   [] positioning   [] body mechanics   [] transfers   [] heat/ice application    [] other:      Other Objective/Functional Measures:    Access Code: [email or web address]   URL: [email or web address]   Date: 12/30/2020   Prepared by: Arnulfo Hicks     Exercises   Supine Piriformis Stretch with Foot on Ground - 3 reps - 1 sets - 30 hold - 1x daily - 7x weekly   Supine Figure 4 Piriformis Stretch - 3 reps - 1 sets - 30 hold - 1x daily - 7x weekly   Seated Hamstring Stretch - 3 reps - 1 sets - 30 hold - 1x daily - 7x weekly   Long Sitting Calf Stretch with Strap - 3 reps - 1 sets - 30 hold - 1x daily - 7x weekly   Standing Hip Flexor Stretch - 3 reps - 1 sets - 30 hold - 1x daily - 7x weekly     Pain Level (0-10 scale) post treatment: 2    ASSESSMENT/Changes in Function: Educated pt on the importance of postural awareness when sitting at desk as well as using a lumbar roll to help with pain from work. Educated pt on taking rest breaks as well as trying to complete HEP before and after work; pt voiced understanding. Continued to address motor control and stabilization interventions to improve body mechanics to improve abilities to complete functional activities. Continued to progress reps and resistance to increase strength and endurance with good return demo. Added sciatic flossing to help with posterior leg pain to help with night pain. Added flexibility interventions to help with soreness and tightness with pt report of improvement in symptoms. Updated HEP and written hardcopy printed for pt to complete. Tech helped to supervise some of the exercises. Verbal cues and demonstration required to use correct body mechanics. Encouraged pt to complete HEP daily to help to continue to progress PT interventions to improve patient's ability to complete functional and community task independently. At the conclusion of the session, pt reported soreness. Patient will continue to benefit from skilled PT services to modify and progress therapeutic interventions, address functional mobility deficits, address ROM deficits, address strength deficits, analyze and address soft tissue restrictions, analyze and cue movement patterns, analyze and modify body mechanics/ergonomics, assess and modify postural abnormalities, address imbalance/dizziness and instruct in home and community integration to attain remaining goals. [x]  See Plan of Care  [x]  See progress note/recertification  []  See Discharge Summary         Progress towards goals / Updated goals:  1. Pt will report compliance and independence to HEP to help the pt manage their pain and symptoms.             Eval: established   Progressing 12/10, some 12/30  Long Term Goals: To be accomplished in 10 treatments:  1.  Pt will increase FOTO score to 66 points to improve ability to perform ADLs. Eval: 61 points  2. Pt will increase MMT B hip IR/ER to 4/5 to improve ability to lift and carry objects with more ease. Eval: 4-/5 for all  3. Pt will increase AROM l/s EXT to 25-50% of WNL with minimal to no increased B LE pain to improve ability to stand with improved posture and with less pain. Eval: 0-25% of WNL with increased B posterior LE pain. 4. Pt will report having minimal to no increased LBP or B LE pain with prolonged sitting at work and with sit to stands to improve ability to perform work activities.   Eval: reported having increased pain with prolonged sitting and sit to stands at work.   Current: continues to have pain with sitting 12/8/2020    PLAN  []  Upgrade activities as tolerated     [x]  Continue plan of care  []  Update interventions per flow sheet       []  Discharge due to:_  []  Other:_      Deric Betancourt 12/30/2020  5:15 PM    Future Appointments   Date Time Provider Leanne Martin   1/6/2021  6:00 PM Keenan Pedro DPT [facility]   1/14/2021  3:45 PM Mila Reece PTA [facility]   1/19/2021  6:00 PM Keenan Pedro DPT [facility]   1/21/2021  4:30 PM Ciarra Briceno DPT [facility]

## 2021-01-06 ENCOUNTER — HOSPITAL ENCOUNTER (OUTPATIENT)
Dept: PHYSICAL THERAPY | Age: 66
Discharge: HOME | End: 2021-01-06
Payer: COMMERCIAL

## 2021-01-06 PROCEDURE — 97110 THERAPEUTIC EXERCISES: CPT

## 2021-01-06 PROCEDURE — 97530 THERAPEUTIC ACTIVITIES: CPT

## 2021-01-06 NOTE — PROGRESS NOTES
[facility] [address] [id number] [facility], [facility], [facility] [address] [address]  [phone] fax    Physician Update  [x] Progress Note  [] Discharge Summary  Patient name: Deuce Doyle Start of Care: 2020   Referral source: Dane DO Jaiosn : 1955   Medical/Treatment Diagnosis: Low back pain [M54.5]  Payor: [employer] / Plan: [address] PT / Product Type: Commerical /  Onset Date: about 2 years ago     Prior Hospitalization: see medical history Provider#: [id number]   Medications: Verified on Patient Summary List    Comorbidities:  depression, visual impaired, osteoporosis, hx 2 c-sections  and   Prior Level of Function: independent with ADLs, functional, and work activities with no limitations. Visits from Start of Care: 6    Missed Visits: 0    Marked improvements noted with skilled PT services - pt subjectively reports a 40% improvement in overall condition, demonstrates increases in B hip strength and continues to demonstrate progress toward majority of long term goals. Objectively, demonstrates limitations in pain free lumbar AROM in addition to subjectively reporting limitations with sitting extended durations due to an increase in symptoms. Patient would continue to benefit from skilled PT services in order to address these deficits and return pt to PLOF of completing functional activities pain free. Progress towards Goals:     1. Pt will report compliance and independence to HEP to help the pt manage their pain and symptoms.             Eval:  Progressing, reports completing HEP x3/wk, 2021     Long Term Goals: To be accomplished in 10 treatments:  1. Pt will increase FOTO score to 66 points to improve ability to perform ADLs. Eval: 61 points  Current: Regressin points     2. Pt will increase MMT B hip IR/ER to 4/5 to improve ability to lift and carry objects with more ease. Eval: 4-/5 for all  Current: Met: 4/5    3. Pt will increase AROM l/s EXT to 25-50% of WNL with minimal to no increased B LE pain to improve ability to stand with improved posture and with less pain. Eval: 0-25% of WNL with increased B posterior LE pain. Current: Progressin-50%, with report of pain in B LE    4. Pt will report having minimal to no increased LBP or B LE pain with prolonged sitting at work and with sit to stands to improve ability to perform work activities.   Eval: reported having increased pain with prolonged sitting and sit to stands at work. Current: No change, continues to have pain with sitting       Goals: to be achieved in 4 weeks:  Continue with goals not met noted above        ASSESSMENT/RECOMMENDATIONS:  [x]Continue therapy per initial plan/protocol at a frequency of  2 x per week for 4 weeks  []Continue therapy with the following recommended changes:_____________________      _____________________________________________________________________  []Discontinue therapy progressing towards or have reached established goals  []Discontinue therapy due to lack of appreciable progress towards goals  []Discontinue therapy due to lack of attendance or compliance  []Await Physician's recommendations/decisions regarding therapy  []Other:________________________________________________________________    Thank you for this referral. Tiarra Michael, ALYSIA 2021 6:09 PM  NOTE TO PHYSICIAN:  PLEASE COMPLETE THE ORDERS BELOW AND   FAX TO [facility] [place] [facility]: [phone]  If you are unable to process this request in 24 hours please contact our office: [phone]    ? I have read the above report and request that my patient continue as recommended. ? I have read the above report and request that my patient continue therapy with the following changes/special instructions:_____________________________________  ? I have read the above report and request that my patient be discharged from therapy.     [address] [facility] Signature:____________Date:_________TIME:________    [facility], Date and Time must be completed for valid certification **

## 2021-01-06 NOTE — PROGRESS NOTES
PT DAILY TREATMENT NOTE     Patient Name: Elmer Clayton  Date:2021  : 1955  [x]  Patient  Verified  Payor: Kaur Quan / Plan: [facility]  [employer]IT[facility] PT / Product Type: Commerical /    In time: 6:08pm  Out time: 6:45pm  Total Treatment Time (min): 37  Visit #: 1 of 8    Treatment Area: Low back pain [M54.5]    SUBJECTIVE  Pain Level (0-10 scale): 4  Any medication changes, allergies to medications, adverse drug reactions, diagnosis change, or new procedure performed?: [x] No    [] Yes (see summary sheet for update)  Subjective functional status/changes:   [] No changes reported  Pt reports a 40% improvement in current condition since starting skilled PT services. OBJECTIVE    27 min Therapeutic Exercise:  [x] See flow sheet :   Rationale: increase ROM, increase strength and updated HEP to improve the patients ability to complete functional activities. 10 min Therapeutic Activity:  [x]  See flow sheet :   Rationale: increase ROM, increase strength, improve coordination, increase proprioception and patient education  to improve the patients ability to complete job related tasks. With   [x] TE   [x] TA   [] neuro   [] other: Patient Education: [x] Review HEP    [] Progressed/Changed HEP based on:   [] positioning   [] body mechanics   [] transfers   [] heat/ice application    [] other:      Pain Level (0-10 scale) post treatment: 2    ASSESSMENT/Changes in Function:     Marked improvements noted with skilled PT services - pt subjectively reports a 40% improvement in overall condition, demonstrates increases in B hip strength and continues to demonstrate progress toward majority of long term goals. Objectively, demonstrates limitations in pain free lumbar AROM in addition to subjectively reporting limitations with sitting extended durations due to an increase in symptoms.  Patient would continue to benefit from skilled PT services in order to address these deficits and return pt to OF of completing functional activities pain free. Patient will continue to benefit from skilled PT services to modify and progress therapeutic interventions, address functional mobility deficits, address ROM deficits, address strength deficits, analyze and address soft tissue restrictions, analyze and cue movement patterns, analyze and modify body mechanics/ergonomics, assess and modify postural abnormalities, address imbalance/dizziness and instruct in home and community integration to attain remaining goals. [x]  See Plan of Care  [x]  See progress note/recertification  []  See Discharge Summary         Progress towards goals / Updated goals:  1. Pt will report compliance and independence to HEP to help the pt manage their pain and symptoms.             Eval:  Progressing, reports completing HEP x3/wk, 2021      Long Term Goals: To be accomplished in 10 treatments:  1. Pt will increase FOTO score to 66 points to improve ability to perform ADLs. Eval: 61 points  Current: Regressin points      2. Pt will increase MMT B hip IR/ER to 4/5 to improve ability to lift and carry objects with more ease. Eval: 4-/5 for all  Current: Met: 4/5     3. Pt will increase AROM l/s EXT to 25-50% of WNL with minimal to no increased B LE pain to improve ability to stand with improved posture and with less pain. Eval: 0-25% of WNL with increased B posterior LE pain. Current: Progressin-50%, with report of pain in B LE     4. Pt will report having minimal to no increased LBP or B LE pain with prolonged sitting at work and with sit to stands to improve ability to perform work activities.   Eval: reported having increased pain with prolonged sitting and sit to stands at work.   Current: No change, continues to have pain with sitting     PLAN  [x]  Upgrade activities as tolerated     [x]  Continue plan of care  []  Update interventions per flow sheet       []  Discharge due to:_  []  Other:_      Dexter Holland DPT 1/6/2021  5:15 PM    Future Appointments   Date Time Provider Leanne Martin   1/14/2021  3:45 PM Aden Morales, HAYLEE [facility]   1/19/2021  6:00 PM Esther Monae, CHRISSYT [facility]   1/21/2021  4:30 PM Sanjiv Briceno, DPT [facility]

## 2021-01-14 ENCOUNTER — HOSPITAL ENCOUNTER (OUTPATIENT)
Dept: PHYSICAL THERAPY | Age: 66
Discharge: HOME | End: 2021-01-14
Payer: COMMERCIAL

## 2021-01-14 PROCEDURE — 97110 THERAPEUTIC EXERCISES: CPT

## 2021-01-14 PROCEDURE — 97530 THERAPEUTIC ACTIVITIES: CPT

## 2021-01-14 NOTE — PROGRESS NOTES
PT DAILY TREATMENT NOTE     Patient Name: Александр End  Date:2021  : 1955  [x]  Patient  Verified  Payor: [facility] / Plan: [facility] PT / Product Type: Commerical /    In time: 3:47  Out time:4:30  Total Treatment Time (min): 43  Visit #: 2 of 8    Medicare/BCBS Only   Total Timed Codes (min):   1:1 Treatment Time:         Treatment Area: Low back pain [M54.5]    SUBJECTIVE  Pain Level (0-10 scale): 3  Any medication changes, allergies to medications, adverse drug reactions, diagnosis change, or new procedure performed?: [x] No    [] Yes (see summary sheet for update)  Subjective functional status/changes:   [] No changes reported  \"Little pian. \"    OBJECTIVE    Modality rationale: decrease edema, decrease inflammation, decrease pain, increase tissue extensibility and increase muscle contraction/control to improve the patients ability to perform ADL    Min Type Additional Details    [] Estim:  []Unatt       []IFC  []Premod                        []Other:  []w/ice   []w/heat  Position:  Location:    [] Estim: []Att    []TENS instruct  []NMES                    []Other:  []w/US   []w/ice   []w/heat  Position:  Location:    []  Traction: [] Cervical       []Lumbar                       [] Prone          []Supine                       []Intermittent   []Continuous Lbs:  [] before manual  [] after manual    []  Ultrasound: []Continuous   [] Pulsed                           []1MHz   []3MHz W/cm2:  Location:    []  Iontophoresis with dexamethasone         Location: [] Take home patch   [] In clinic    []  Ice     []  heat  []  Ice massage  []  Laser   []  Anodyne Position:  Location:    []  Laser with stim  []  Other:  Position:  Location:    []  Vasopneumatic Device Pressure:       [] lo [] med [] hi   Temperature: [] lo [] med [] hi   [x] Skin assessment post-treatment:  [x]intact []redness- no adverse reaction    []redness  adverse reaction:      min []Eval                  []Re-Eval 30 min Therapeutic Exercise:  [x] See flow sheet :   Rationale: increase ROM and increase strength to improve the patients ability to perform ADL     13 min Therapeutic Activity:  [x]  See flow sheet :   Rationale: increase ROM, increase strength and improve coordination  to improve the patients ability to perform ADL       min Neuromuscular Re-education:  []  See flow sheet :   Rationale: increase ROM, increase strength, improve coordination, improve balance and increase proprioception  to improve the patients ability to perform ADL     min Manual Therapy: The manual therapy interventions were performed at a separate and distinct time from the therapeutic activities interventions. Rationale: decrease pain, increase ROM, increase tissue extensibility, decrease edema , decrease trigger points and increase postural awareness to perform ADL      min Gait Training:  ___ feet with ___ device on level surfaces with ___ level of assist   Rationale: With   [x] TE   [] TA   [] neuro   [] other: Patient Education: [x] Review HEP    [] Progressed/Changed HEP based on:   [] positioning   [] body mechanics   [] transfers   [] heat/ice application    [x] other: educated pt on correct seating with towel roll behind back for support     Other Objective/Functional Measures:      Pain Level (0-10 scale) post treatment: 0    ASSESSMENT/Changes in Function: Pt completed each strengthening there ex fairly well with cues. Following treatment pain was resolved. Pt is progressing towards achieving all  Updated goals. Patient will continue to benefit from skilled PT services to address functional mobility deficits, address ROM deficits, address strength deficits, analyze and address soft tissue restrictions, analyze and cue movement patterns, analyze and modify body mechanics/ergonomics, assess and modify postural abnormalities and instruct in home and community integration to attain remaining goals.      [x]  See Plan of Care  []  See progress note/recertification  []  See Discharge Summary         Progress towards goals / Updated goals:  1. Pt will report compliance and independence to HEP to help the pt manage their pain and symptoms.             Eval:  Progressing, reports completing HEP x3/wk, 2021      Long Term Goals: To be accomplished in 10 treatments:  1. Pt will increase FOTO score to 66 points to improve ability to perform ADLs. Eval: 61 points  Current: Regressin points      2. Pt will increase MMT B hip IR/ER to 4/5 to improve ability to lift and carry objects with more ease. Eval: 4-/5 for all  Current: Met: 4/5     3. Pt will increase AROM l/s EXT to 25-50% of WNL with minimal to no increased B LE pain to improve ability to stand with improved posture and with less pain. Eval: 0-25% of WNL with increased B posterior LE pain. Current: Progressin-50%, with report of pain in B LE       4. Pt will report having minimal to no increased LBP or B LE pain with prolonged sitting at work and with sit to stands to improve ability to perform work activities.   Eval: reported having increased pain with prolonged sitting and sit to stands at work.   Current: No change, continues to have pain with sitting     PLAN  [x]  Upgrade activities as tolerated     []  Continue plan of care  []  Update interventions per flow sheet       []  Discharge due to:_  []  Other:_      Felicia Mccall, PTA 2021  3:55 PM    Future Appointments   Date Time Provider Leanne Martin   2021  6:00 PM Natalie Murphy DPT [facility]   2021  4:30 PM Hui Briceno DPT [facility]

## 2021-01-19 ENCOUNTER — HOSPITAL ENCOUNTER (OUTPATIENT)
Dept: PHYSICAL THERAPY | Age: 66
Discharge: HOME | End: 2021-01-19
Payer: COMMERCIAL

## 2021-01-19 PROCEDURE — 97110 THERAPEUTIC EXERCISES: CPT

## 2021-01-19 PROCEDURE — 97530 THERAPEUTIC ACTIVITIES: CPT

## 2021-01-19 NOTE — PROGRESS NOTES
PT DAILY TREATMENT NOTE     Patient Name: Norma York  Date:2021  : 1955  [x]  Patient  Verified  Payor: Bernadette Sheppard / Plan: [address] [place] [address] Rd PT / Product Type: Commerical /    In time: 6:05pm  Out time: 6:44pm  Total Treatment Time (min): 39  Visit #: 3 of 8    Treatment Area: Low back pain [M54.5]    SUBJECTIVE  Pain Level (0-10 scale): 2  Any medication changes, allergies to medications, adverse drug reactions, diagnosis change, or new procedure performed?: [x] No    [] Yes (see summary sheet for update)  Subjective functional status/changes:   [] No changes reported  Pt reports improvements in overall sitting tolerance, however noticed an increase in symptoms with standing extended durations. OBJECTIVE    29 min Therapeutic Exercise:  [x] See flow sheet :   Rationale: increase ROM, increase strength and updated HEP to improve the patients ability to complete functional activities. 10 min Therapeutic Activity:  [x]  See flow sheet :   Rationale: increase ROM, increase strength, improve coordination, increase proprioception and patient education  to improve the patients ability to complete job related tasks. With   [x] TE   [x] TA   [] neuro   [] other: Patient Education: [x] Review HEP    [] Progressed/Changed HEP based on:   [] positioning   [] body mechanics   [] transfers   [] heat/ice application    [] other:      Pain Level (0-10 scale) post treatment: 1    ASSESSMENT/Changes in Function:     Continued progression of activity program with increasing resistance and reps of activities performed over previous sessions. Pt tolerates well with no increase in symptoms. Leaves session with report of an overall decrease in pain levels.        Patient will continue to benefit from skilled PT services to modify and progress therapeutic interventions, address functional mobility deficits, address ROM deficits, address strength deficits, analyze and address soft tissue restrictions, analyze and cue movement patterns, analyze and modify body mechanics/ergonomics, assess and modify postural abnormalities, address imbalance/dizziness and instruct in home and community integration to attain remaining goals. [x]  See Plan of Care  []  See progress note/recertification  []  See Discharge Summary          Progress towards goals / Updated goals:  1. Pt will report compliance and independence to HEP to help the pt manage their pain and symptoms.             Eval:  Progressing, reports completing HEP x3/wk, 2021      Long Term Goals: To be accomplished in 10 treatments:  1. Pt will increase FOTO score to 66 points to improve ability to perform ADLs. Eval: 61 points  Current: Regressin points      2. Pt will increase MMT B hip IR/ER to 4/5 to improve ability to lift and carry objects with more ease. Eval: 4-/5 for all  Current: Met: 4/5     3. Pt will increase AROM l/s EXT to 25-50% of WNL with minimal to no increased B LE pain to improve ability to stand with improved posture and with less pain. Eval: 0-25% of WNL with increased B posterior LE pain. Current: Progressin-50%, with report of pain in B LE       4.  Pt will report having minimal to no increased LBP or B LE pain with prolonged sitting at work and with sit to stands to improve ability to perform work activities.   P/N: No change, continues to have pain with sitting   Current: Progressing: states she has some pain with sitting, however it is not as intense as before, 21      PLAN  [x]  Upgrade activities as tolerated     [x]  Continue plan of care  []  Update interventions per flow sheet       []  Discharge due to:_  []  Other:_      Derian Jordan DPT 2021  5:15 PM    Future Appointments   Date Time Provider Leanne Veronica   2021  4:30 PM [facility]

## 2021-01-21 ENCOUNTER — APPOINTMENT (OUTPATIENT)
Dept: PHYSICAL THERAPY | Age: 66
End: 2021-01-21
Payer: COMMERCIAL

## 2021-02-03 ENCOUNTER — HOSPITAL ENCOUNTER (OUTPATIENT)
Dept: PHYSICAL THERAPY | Age: 66
Discharge: HOME | End: 2021-02-03
Payer: COMMERCIAL

## 2021-02-03 PROCEDURE — 97112 NEUROMUSCULAR REEDUCATION: CPT

## 2021-02-03 PROCEDURE — 97110 THERAPEUTIC EXERCISES: CPT

## 2021-02-03 PROCEDURE — 97530 THERAPEUTIC ACTIVITIES: CPT

## 2021-02-03 NOTE — PROGRESS NOTES
PT DAILY TREATMENT NOTE     Patient Name: Rebecca   Date:2/3/2021  : 1955  [x]  Patient  Verified  Payor: [employer] / Plan: [address] PT / Product Type: Commerical /    In time: 4:41pm  Out time: 5:13pm  Total Treatment Time (min): 32  Visit #: 4 of 8    Treatment Area: Low back pain [M54.5]    SUBJECTIVE  Pain Level (0-10 scale): 2  Any medication changes, allergies to medications, adverse drug reactions, diagnosis change, or new procedure performed?: [x] No    [] Yes (see summary sheet for update)  Subjective functional status/changes:   [] No changes reported  Pt reports improvements in overall functional status, however states pain still comes at random times. Later correlating symptom increase to standing extended durations. OBJECTIVE    12 min Therapeutic Exercise:  [x] See flow sheet :   Rationale: increase ROM, increase strength and updated HEP to improve the patients ability to complete functional activities. 10 min Therapeutic Activity:  [x]  See flow sheet :   Rationale: increase ROM, increase strength, improve coordination, increase proprioception and patient education  to improve the patients ability to complete job related tasks. 10 min Neuromuscular Re-education:  [x]  See flow sheet :   Rationale: increase strength, improve coordination, improve balance and increase proprioception  to improve the patients ability to complete ADLs with greater ease         With   [x] TE   [x] TA   [] neuro   [] other: Patient Education: [x] Review HEP    [] Progressed/Changed HEP based on:   [] positioning   [] body mechanics   [] transfers   [] heat/ice application    [] other:      Pain Level (0-10 scale) post treatment: 0    ASSESSMENT/Changes in Function:     Modified session secondary to time constraints. Continued progression of activity program with increasing repititions of activities performed over previous sessions.   Pt tolerates well with no increase in symptoms. Leaves session with report of an overall decrease in pain levels. Patient will continue to benefit from skilled PT services to modify and progress therapeutic interventions, address functional mobility deficits, address ROM deficits, address strength deficits, analyze and address soft tissue restrictions, analyze and cue movement patterns, analyze and modify body mechanics/ergonomics, assess and modify postural abnormalities, address imbalance/dizziness and instruct in home and community integration to attain remaining goals. [x]  See Plan of Care  []  See progress note/recertification  []  See Discharge Summary          Progress towards goals / Updated goals:  1. Pt will report compliance and independence to HEP to help the pt manage their pain and symptoms.             Eval:  Progressing, reports completing HEP x3/wk, 2021      Long Term Goals: To be accomplished in 10 treatments:  1. Pt will increase FOTO score to 66 points to improve ability to perform ADLs. Eval: 61 points  Current: Regressin points      2. Pt will increase MMT B hip IR/ER to 4/5 to improve ability to lift and carry objects with more ease. Eval: 4-/5 for all  Current: Met: 4/5     3. Pt will increase AROM l/s EXT to 25-50% of WNL with minimal to no increased B LE pain to improve ability to stand with improved posture and with less pain. Eval: 0-25% of WNL with increased B posterior LE pain. Current: Progressin-50%, with report of pain in B LE       4.  Pt will report having minimal to no increased LBP or B LE pain with prolonged sitting at work and with sit to stands to improve ability to perform work activities.   P/N: No change, continues to have pain with sitting   Current: Progressing: states she has some pain with sitting, however it is not as intense as before, 21      PLAN  [x]  Upgrade activities as tolerated     [x]  Continue plan of care  []  Update interventions per flow sheet       [] Discharge due to:_  []  Other:_      Yasmeen Nicole DPT 2/3/2021  5:15 PM    Future Appointments   Date Time Provider Leanne Martin   2/4/2021  5:15 PM [facility]

## 2021-02-04 ENCOUNTER — HOSPITAL ENCOUNTER (OUTPATIENT)
Dept: PHYSICAL THERAPY | Age: 66
Discharge: HOME | End: 2021-02-04
Payer: COMMERCIAL

## 2021-02-04 PROCEDURE — 97112 NEUROMUSCULAR REEDUCATION: CPT

## 2021-02-04 PROCEDURE — 97110 THERAPEUTIC EXERCISES: CPT

## 2021-02-04 PROCEDURE — 97530 THERAPEUTIC ACTIVITIES: CPT

## 2021-02-04 NOTE — PROGRESS NOTES
PT DAILY TREATMENT NOTE     Patient Name: Norma York  Date:2021  : 1955  [x]  Patient  Verified  Payor: Bernadette Sheppard / [facility] / Product Type: Commerical /    In time: 5: Out time:6:11  Total Treatment Time (min): 48  Visit #: 5 of 8    Medicare/BCBS Only   Total Timed Codes (min):   1:1 Treatment Time:         Treatment Area: Low back pain [M54.5]    SUBJECTIVE  Pain Level (0-10 scale): 3  Any medication changes, allergies to medications, adverse drug reactions, diagnosis change, or new procedure performed?: [x] No    [] Yes (see summary sheet for update)  Subjective functional status/changes:   [] No changes reported  \"I usually have pain when im walking or standing. \"    OBJECTIVE    Modality rationale: decrease edema, decrease inflammation, decrease pain and increase tissue extensibility to improve the patients ability to perform ADL    Min Type Additional Details    [] Estim:  []Unatt       []IFC  []Premod                        []Other:  []w/ice   []w/heat  Position:  Location:    [] Estim: []Att    []TENS instruct  []NMES                    []Other:  []w/US   []w/ice   []w/heat  Position:  Location:    []  Traction: [] Cervical       []Lumbar                       [] Prone          []Supine                       []Intermittent   []Continuous Lbs:  [] before manual  [] after manual    []  Ultrasound: []Continuous   [] Pulsed                           []1MHz   []3MHz W/cm2:  Location:    []  Iontophoresis with dexamethasone         Location: [] Take home patch   [] In clinic   10 []  Ice     [x]  heat  []  Ice massage  []  Laser   []  Anodyne Position:supine  Location:(B) LSP    []  Laser with stim  []  Other:  Position:  Location:    []  Vasopneumatic Device Pressure:       [] lo [] med [] hi   Temperature: [] lo [] med [] hi   [x] Skin assessment post-treatment:  [x]intact []redness- no adverse reaction    []redness  adverse reaction:      min []Eval []Re-Eval       20 min Therapeutic Exercise:  [] See flow sheet :   Rationale: increase ROM, increase strength and improve coordination to improve the patients ability to perform ADL    10 min Therapeutic Activity:  []  See flow sheet :   Rationale: increase ROM, increase strength and improve coordination  to improve the patients ability to perform ADL      8 min Neuromuscular Re-education:  []  See flow sheet :   Rationale: increase ROM, increase strength, improve coordination, improve balance and increase proprioception  to improve the patients ability to perform ADL     min Manual Therapy: The manual therapy interventions were performed at a separate and distinct time from the therapeutic activities interventions. Rationale: decrease pain, increase ROM, increase tissue extensibility, decrease trigger points and increase postural awareness to perform ADL      min Gait Training:  ___ feet with ___ device on level surfaces with ___ level of assist   Rationale: With   [x] TE   [] TA   [] neuro   [] other: Patient Education: [x] Review HEP    [] Progressed/Changed HEP based on:   [] positioning   [] body mechanics   [] transfers   [] heat/ice application    [] other:      Other Objective/Functional Measures:      Pain Level (0-10 scale) post treatment: .5    ASSESSMENT/Changes in Function: Pt experienced minimal pain with bridges with ball squeeze. Pt ambulated 100ft with slight discomfort at post thigh/buttox. Overall pt benefited with treatment today. Patient will continue to benefit from skilled PT services to address functional mobility deficits, address ROM deficits, address strength deficits, analyze and address soft tissue restrictions, analyze and cue movement patterns, assess and modify postural abnormalities and instruct in home and community integration to attain remaining goals.      [x]  See Plan of Care  []  See progress note/recertification  []  See Discharge Summary         Progress towards goals / Updated goals:  1. Pt will report compliance and independence to HEP to help the pt manage their pain and symptoms.             Eval:  Progressing, reports completing HEP x3/wk, 2021      Long Term Goals: To be accomplished in 10 treatments:  1. Pt will increase FOTO score to 66 points to improve ability to perform ADLs. Eval: 61 points  Current: Regressin points      2. Pt will increase MMT B hip IR/ER to 4/5 to improve ability to lift and carry objects with more ease. Eval: 4-/5 for all  Current: Met: 4/5     3. Pt will increase AROM l/s EXT to 25-50% of WNL with minimal to no increased B LE pain to improve ability to stand with improved posture and with less pain. Eval: 0-25% of WNL with increased B posterior LE pain. Current: Progressin-50%, with report of pain in B LE       4. Pt will report having minimal to no increased LBP or B LE pain with prolonged sitting at work and with sit to stands to improve ability to perform work activities.   P/N: No change, continues to have pain with sitting   Current: Progressing: states she has some pain with sitting, however it is not as intense as before, 21   Progressing     PLAN  [x]  Upgrade activities as tolerated     []  Continue plan of care  []  Update interventions per flow sheet       []  Discharge due to:_  []  Other:_      Felicia Mccall, PTA 2021  5:47 PM    No future appointments.

## 2021-02-10 ENCOUNTER — HOSPITAL ENCOUNTER (OUTPATIENT)
Dept: PHYSICAL THERAPY | Age: 66
Discharge: HOME | End: 2021-02-10
Payer: COMMERCIAL

## 2021-02-10 PROCEDURE — 97110 THERAPEUTIC EXERCISES: CPT

## 2021-02-10 PROCEDURE — 97530 THERAPEUTIC ACTIVITIES: CPT

## 2021-02-10 PROCEDURE — 97112 NEUROMUSCULAR REEDUCATION: CPT

## 2021-02-10 NOTE — PROGRESS NOTES
PT DAILY TREATMENT NOTE     Patient Name: Glenny Schaffer  Date:2/10/2021  : 1955  [x]  Patient  Verified  Payor: Herber Avery / Plan: [address] PT / Product Type: Commerical /    In time: 5:15pm Out time: 5:50pm  Total Treatment Time (min): 35  Visit #: 6 of 8    Treatment Area: Low back pain [M54.5]    SUBJECTIVE  Pain Level (0-10 scale): 2  Any medication changes, allergies to medications, adverse drug reactions, diagnosis change, or new procedure performed?: [x] No    [] Yes (see summary sheet for update)  Subjective functional status/changes:   [] No changes reported  Pt reports increased pain levels upon attempting to stand on a chair to reach object in cabinet earlier today. No pain occurred while performing activity, however thinks this is what caused the flare up. Continues to experience pain with sitting extended durations. OBJECTIVE    15 min Therapeutic Exercise:  [x] See flow sheet :   Rationale: increase ROM, increase strength and improve coordination to improve the patients ability to perform ADLs with greater ease     10 min Therapeutic Activity:  [x]  See flow sheet :   Rationale: increase ROM, increase strength and improve coordination  to improve the patients ability to perform ADs with greater ease      10 min Neuromuscular Re-education:  [x]  See flow sheet :   Rationale: increase ROM, increase strength, improve coordination, improve balance and increase proprioception  to improve the patients ability to perform ADLs with greater ease          With   [x] TE   [] TA   [] neuro   [] other: Patient Education: [x] Review HEP    [] Progressed/Changed HEP based on:   [] positioning   [] body mechanics   [] transfers   [] heat/ice application    [] other:      Pain Level (0-10 scale) post treatment: 2    ASSESSMENT/Changes in Function: Pt with poor tolerance performing bridges and sit to stands during today's sessions secondary to increase in pain levels.   Modifications provided however pt continues to report pain. Discontinued activities during today's session w/ further discussion on symptoms, however pt demonstrates significant difficulty explaining frequently stating \"I don't know\". Patient will continue to benefit from skilled PT services to address functional mobility deficits, address ROM deficits, address strength deficits, analyze and address soft tissue restrictions, analyze and cue movement patterns, assess and modify postural abnormalities and instruct in home and community integration to attain remaining goals. [x]  See Plan of Care  []  See progress note/recertification  []  See Discharge Summary         Progress towards goals / Updated goals:  1. Pt will report compliance and independence to HEP to help the pt manage their pain and symptoms.             Eval:  Progressing, reports completing HEP x3/wk, 2021      Long Term Goals: To be accomplished in 10 treatments:  1. Pt will increase FOTO score to 66 points to improve ability to perform ADLs. Eval: 61 points  Current: Regressin points      2. Pt will increase MMT B hip IR/ER to 4/5 to improve ability to lift and carry objects with more ease. Eval: 4-/5 for all  Current: Met: 4/5     3. Pt will increase AROM l/s EXT to 25-50% of WNL with minimal to no increased B LE pain to improve ability to stand with improved posture and with less pain. Eval: 0-25% of WNL with increased B posterior LE pain. Current: Progressin-50%, with report of pain in B LE       4.  Pt will report having minimal to no increased LBP or B LE pain with prolonged sitting at work and with sit to stands to improve ability to perform work activities.   P/N: No change, continues to have pain with sitting   Current: Progressing: states she has some pain with sitting, however it is not as intense as before, 02/10/21     PLAN  [x]  Upgrade activities as tolerated     []  Continue plan of care  []  Update interventions per flow sheet       []  Discharge due to:_  []  Other:_      Clara Lozano DPT 2/10/2021  5:47 PM    Future Appointments   Date Time Provider Leanne Martin   2/15/2021  5:15 PM [facility] [address]   2/17/2021  5:15 PM Jan Esqueda

## 2021-02-15 ENCOUNTER — HOSPITAL ENCOUNTER (OUTPATIENT)
Dept: PHYSICAL THERAPY | Age: 66
Discharge: HOME | End: 2021-02-15
Payer: COMMERCIAL

## 2021-02-15 PROCEDURE — 97112 NEUROMUSCULAR REEDUCATION: CPT

## 2021-02-15 PROCEDURE — 97530 THERAPEUTIC ACTIVITIES: CPT

## 2021-02-15 PROCEDURE — 97110 THERAPEUTIC EXERCISES: CPT

## 2021-02-15 NOTE — PROGRESS NOTES
[facility] [address] [id number] [facility], [facility], [facility] [address]  [phone] fax    Physician Update  [x] Progress Note  [] Discharge Summary  Patient name: Mary Jones Start of Care: 20   Referral source: Garcia Camilo DO : 1955   Medical/Treatment Diagnosis: Low back pain [M54.5]  Payor: [employer] / Plan: [address] PT / Product Type: Commerical /  Onset Date:about 2 years ago     Prior Hospitalization: see medical history Provider#: [id number]   Medications: Verified on Patient Summary List    Comorbidities:  depression, visual impaired, osteoporosis, hx 2 c-sections  and   Prior Level of Function: independent with ADLs, functional, and work activities with no limitations. Visits from Start of Care: 13    Missed Visits: 1    Status at Evaluation/Last Progress Note: last MD note  Progress towards Goals: progressing fairly overall with residual pain 3/10. She reports HEP compliance and her Hershey Balwinder has improved to 52. She continues with pain and notes although PT is helping her carryover with decrease pain is limited . Patient will continue to benefit from skilled PT services to modify and progress therapeutic interventions, address ROM deficits, address strength deficits, analyze and address soft tissue restrictions, analyze and cue movement patterns, analyze and modify body mechanics/ergonomics, assess and modify postural abnormalities and instruct in home and community integration to attain remaining goals. Goals: to be achieved in 2 weeks:  1. Pt will increase FOTO score to 66 points to improve ability to perform ADLs. PN: 52 2/15      2. Pt will increase AROM l/s EXT to 25-50% of WNL with minimal to no increased B LE pain to improve ability to stand with improved posture and with less pain.   PN : Progressin-50%, with report of pain in B LE  2/15     3 Pt will report having minimal to no increased LBP or B LE pain with prolonged sitting at work and with sit to stands to improve ability to perform work activities.   PN Progressing: states she has some pain with sitting, however it is not as intense as before, 02/10/21 same 2/15   ASSESSMENT/RECOMMENDATIONS:  [x]Continue therapy per initial plan/protocol at a frequency of  2 x per week for 2 weeks  []Continue therapy with the following recommended changes:_____________________      _____________________________________________________________________  []Discontinue therapy progressing towards or have reached established goals  []Discontinue therapy due to lack of appreciable progress towards goals  []Discontinue therapy due to lack of attendance or compliance  []Await Physician's recommendations/decisions regarding therapy  []Other:________________________________________________________________    Thank you for this referral. Dimple Cullen, PT 2/15/2021 6:35 PM  NOTE TO PHYSICIAN:  Mame Wallace [address]   FAX TO [facility]: [phone]  If you are unable to process this request in 24 hours please contact our office: [phone]    ? I have read the above report and request that my patient continue as recommended. ? I have read the above report and request that my patient continue therapy with the following changes/special instructions:_____________________________________  ? I have read the above report and request that my patient be discharged from therapy.     [de-identified] Signature:____________Date:_________TIME:________     Ruthie Osorio, DO  ** Signature, Date and Time must be completed for valid certification **

## 2021-02-15 NOTE — PROGRESS NOTES
PT DAILY TREATMENT NOTE     Patient Name: Maddie Deras  Date:2/15/2021  : 1955  [x]  Patient  Verified  Payor: Esdras Hough / Plan: [employer]  CAPITATED PT / Product Type: Commerical /    In time:515  Out time:613  Total Treatment Time (min): 58  Visit #: 1 of 4       Treatment Area: Low back pain [M54.5]    SUBJECTIVE  Pain Level (0-10 scale): 3  Any medication changes, allergies to medications, adverse drug reactions, diagnosis change, or new procedure performed?: [x] No    [] Yes (see summary sheet for update)  Subjective functional status/changes:   [] No changes reported  \"The pain comes and goes, the pain is there when I get up during the night- in the back of my legs. Overall I think the therapy is helping.  \"    OBJECTIVE    Modality rationale: decrease pain and increase tissue extensibility to improve the patients ability to tolerate ADLS and activities   Min Type Additional Details    [] Estim:  []Unatt       []IFC  []Premod                        []Other:  []w/ice   []w/heat  Position:  Location:    [] Estim: []Att    []TENS instruct  []NMES                    []Other:  []w/US   []w/ice   []w/heat  Position:  Location:    []  Traction: [] Cervical       []Lumbar                       [] Prone          []Supine                       []Intermittent   []Continuous Lbs:  [] before manual  [] after manual    []  Ultrasound: []Continuous   [] Pulsed                           []1MHz   []3MHz W/cm2:  Location:    []  Iontophoresis with dexamethasone         Location: [] Take home patch   [] In clinic   10 []  Ice     [x]  heat  Post   []  Ice massage  []  Laser   []  Anodyne Position:reclined  Location:LS    []  Laser with stim  []  Other:  Position:  Location:    []  Vasopneumatic Device Pressure:       [] lo [] med [] hi   Temperature: [] lo [] med [] hi   [] Skin assessment post-treatment:  []intact []redness- no adverse reaction    []redness  adverse reaction:          25 min Therapeutic Exercise:  [x] See flow sheet :   Rationale: increase ROM, increase strength and improve coordination to improve the patients ability to tolerate ADLs and activities    15 min Therapeutic Activity:  [x]  See flow sheet :   Rationale: increase ROM, increase strength and improve coordination  to improve the patients ability to tolerate ADLS and activities     8 min Neuromuscular Re-education:  [x]  See flow sheet :   Rationale: increase ROM, increase strength and improve coordination  to improve the patients ability to tolerate ADLs and activities           With   [x] TE   [x] TA   [] neuro   [] other: Patient Education: [x] Review HEP    [] Progressed/Changed HEP based on:   [] positioning   [] body mechanics   [] transfers   [] heat/ice application    [x] other: POC, FOTO, Goals     Other Objective/Functional Measures: VC exercises and tech, assisted by exercise tech     Pain Level (0-10 scale) post treatment: 3    ASSESSMENT/Changes in Function: FOTO 52, continues with pain, reports PT is helping her. Some difficulty noted with answering FOTO questions. Patient will continue to benefit from skilled PT services to modify and progress therapeutic interventions, address ROM deficits, address strength deficits, analyze and address soft tissue restrictions, analyze and cue movement patterns, analyze and modify body mechanics/ergonomics, assess and modify postural abnormalities and instruct in home and community integration to attain remaining goals. [x]  See Plan of Care  [x]  See progress note/recertification  []  See Discharge Summary         Progress towards goals / Updated goals:   1. Pt will report compliance and independence to HEP to help the pt manage their pain and symptoms.             Eval:  Progressing, reports completing HEP x3/wk, 01/06/2021   Current reports compliance 2/15     Long Term Goals: To be accomplished in 10 treatments:  1.  Pt will increase FOTO score to 66 points to improve ability to perform ADLs. Eval: 61 points  Current: 52 2/15     2. Pt will increase MMT B hip IR/ER to 4/5 to improve ability to lift and carry objects with more ease. Eval: 4-/5 for all  Current: Met: 4/5      3. Pt will increase AROM l/s EXT to 25-50% of WNL with minimal to no increased B LE pain to improve ability to stand with improved posture and with less pain. Eval: 0-25% of WNL with increased B posterior LE pain. Current: Progressin-50%, with report of pain in B LE  2/15     4.  Pt will report having minimal to no increased LBP or B LE pain with prolonged sitting at work and with sit to stands to improve ability to perform work activities.   P/N: No change, continues to have pain with sitting   Current: Progressing: states she has some pain with sitting, however it is not as intense as before, 02/10/21 same 2/15        PLAN  [x]  Upgrade activities as tolerated     [x]  Continue plan of care  []  Update interventions per flow sheet       []  Discharge due to:_  [x]  Other:_send MD note      Dimple Cullen, PT 2/15/2021  5:36 PM    Future Appointments   Date Time Provider Leanne Martin   2021  5:15 PM Claude Spearing [facility] [address]

## 2021-02-17 ENCOUNTER — HOSPITAL ENCOUNTER (OUTPATIENT)
Dept: PHYSICAL THERAPY | Age: 66
Discharge: HOME | End: 2021-02-17
Payer: COMMERCIAL

## 2021-02-17 PROCEDURE — 97112 NEUROMUSCULAR REEDUCATION: CPT

## 2021-02-17 PROCEDURE — 97530 THERAPEUTIC ACTIVITIES: CPT

## 2021-02-17 PROCEDURE — 97110 THERAPEUTIC EXERCISES: CPT

## 2021-02-17 NOTE — PROGRESS NOTES
PT DAILY TREATMENT NOTE     Patient Name: Kianna Velasco  Date:2021  : 1955  [x]  Patient  Verified  Payor: Jai Kaufman / Plan: [employer]  CAPITATED PT / Product Type: Commerical /    In time: 5:18  Out time: 6:08  Total Treatment Time (min): 48  Visit #: 2 of 4    Treatment Area: Low back pain [M54.5]    SUBJECTIVE  Pain Level (0-10 scale): 2-3  Any medication changes, allergies to medications, adverse drug reactions, diagnosis change, or new procedure performed?: [x] No    [] Yes (see summary sheet for update)  Subjective functional status/changes:   [] No changes reported  Pain varies, \"I have notice that if I'm leaning on a grocery cart, it's better. I can be in bed in any position and when I get up to do something, I just hurt behind my thighs. \"    OBJECTIVE    Modality rationale: decrease inflammation, decrease pain and increase tissue extensibility to improve the patients ability to  tolerate exercises and return to daily activity with ease   Min Type Additional Details    [] Estim:  []Unatt       []IFC  []Premod                        []Other:  []w/ice   []w/heat  Position:  Location:    [] Estim: []Att    []TENS instruct  []NMES                    []Other:  []w/US   []w/ice   []w/heat  Position:  Location:    []  Traction: [] Cervical       []Lumbar                       [] Prone          []Supine                       []Intermittent   []Continuous Lbs:  [] before manual  [] after manual    []  Ultrasound: []Continuous   [] Pulsed                           []1MHz   []3MHz W/cm2:  Location:    []  Iontophoresis with dexamethasone         Location: [] Take home patch   [] In clinic   10 []  Ice     [x]  heat  []  Ice massage  []  Laser   []  Anodyne Position:supine  Location:low back and glutes/(B) thighs    []  Laser with stim  []  Other:  Position:  Location:    []  Vasopneumatic Device Pressure:       [] lo [] med [] hi   Temperature: [] lo [] med [] hi   [] Skin assessment post-treatment: []intact []redness- no adverse reaction    []redness  adverse reaction:     18 min Therapeutic Exercise:  [x] See flow sheet :   Rationale: increase ROM and increase strength to improve the patients ability to perform ADLs with ease    10 min Therapeutic Activity:  [x]  See flow sheet :   Rationale: increase strength, improve coordination and increase proprioception  to improve the patients ability to  perform daily tasks and return to PLOF     10 min Neuromuscular Re-education:  [x]  See flow sheet :   Rationale: improve coordination, improve balance and increase proprioception  to improve the patients ability to stabilize, use proper body mechanics, and promote proper postural awareness        With   [x] TE   [x] TA   [] neuro   [] other: Patient Education: [x] Review HEP    [] Progressed/Changed HEP based on:   [] positioning   [] body mechanics   [] transfers   [] heat/ice application    [] other:      Other Objective/Functional Measures:   Increased reps on hip 3 way as tolerated  HS and hip flexor stretch completed in supine with strap     Pain Level (0-10 scale) post treatment: 1    ASSESSMENT/Changes in Function: Patient progressing well and demonstrating improvements with ROM as well as core and BLE strength. She is agreeable to discharge next week with preparation for HEP. She reports reduced pain post session, however worries she will have pain later; advised pt to stretch often, whenever she is able. Patient will continue to benefit from skilled PT services to modify and progress therapeutic interventions, address functional mobility deficits, address ROM deficits, address strength deficits, analyze and address soft tissue restrictions, analyze and cue movement patterns, analyze and modify body mechanics/ergonomics and assess and modify postural abnormalities to attain remaining goals.      []  See Plan of Care  [x]  See progress note/recertification  []  See Discharge Summary         Progress towards goals / Updated goals:  1. Pt will increase FOTO score to 66 points to improve ability to perform ADLs. PN: 52 2/15      2. Pt will increase AROM l/s EXT to 25-50% of WNL with minimal to no increased B LE pain to improve ability to stand with improved posture and with less pain. PN : Progressin-50%, with report of pain in B LE  2/15     3 Pt will report having minimal to no increased LBP or B LE pain with prolonged sitting at work and with sit to stands to improve ability to perform work activities.   PN Progressing: states she has some pain with sitting, however it is not as intense as before, 02/10/21 same 2/15     PLAN  [x]  Upgrade activities as tolerated     [x]  Continue plan of care  []  Update interventions per flow sheet       []  Discharge due to:_  []  Other:_      EMERSON Garvey 2021  5:22 PM    No future appointments.

## 2021-02-23 ENCOUNTER — HOSPITAL ENCOUNTER (OUTPATIENT)
Dept: PHYSICAL THERAPY | Age: 66
Discharge: HOME | End: 2021-02-23
Payer: COMMERCIAL

## 2021-02-23 PROCEDURE — 97110 THERAPEUTIC EXERCISES: CPT

## 2021-02-23 PROCEDURE — 97530 THERAPEUTIC ACTIVITIES: CPT

## 2021-02-23 PROCEDURE — 97112 NEUROMUSCULAR REEDUCATION: CPT

## 2021-02-23 PROCEDURE — 97140 MANUAL THERAPY 1/> REGIONS: CPT

## 2021-02-23 NOTE — PROGRESS NOTES
PT DAILY TREATMENT NOTE     Patient Name: Maddie Deras  Date:2021  : 1955  [x]  Patient  Verified  Payor: Esdras Hough / Plan: [employer]  CAPITATED PT / Product Type: Commerical /    In time: 9:09 Out time:9:55  Total Treatment Time (min): 46  Visit #: 3 of 4    Medicare/BCBS Only   Total Timed Codes (min):   1:1 Treatment Time:         Treatment Area: Low back pain [M54.5]    SUBJECTIVE  Pain Level (0-10 scale): 3  Any medication changes, allergies to medications, adverse drug reactions, diagnosis change, or new procedure performed?: [x] No    [] Yes (see summary sheet for update)  Subjective functional status/changes:   [] No changes reported  \"Still some pain. \"    OBJECTIVE    Modality rationale: decrease edema, decrease inflammation, decrease pain, increase tissue extensibility and increase muscle contraction/control to improve the patients ability to perform ADL    Min Type Additional Details    [] Estim:  []Unatt       []IFC  []Premod                        []Other:  []w/ice   []w/heat  Position:  Location:    [] Estim: []Att    []TENS instruct  []NMES                    []Other:  []w/US   []w/ice   []w/heat  Position:  Location:    []  Traction: [] Cervical       []Lumbar                       [] Prone          []Supine                       []Intermittent   []Continuous Lbs:  [] before manual  [] after manual    []  Ultrasound: []Continuous   [] Pulsed                           []1MHz   []3MHz W/cm2:  Location:    []  Iontophoresis with dexamethasone         Location: [] Take home patch   [] In clinic    []  Ice     []  heat  []  Ice massage  []  Laser   []  Anodyne Position:  Location:    []  Laser with stim  []  Other:  Position:  Location:    []  Vasopneumatic Device Pressure:       [] lo [] med [] hi   Temperature: [] lo [] med [] hi   [x] Skin assessment post-treatment:  [x]intact []redness- no adverse reaction    []redness  adverse reaction:      min []Eval                  []Re-Eval 18 min Therapeutic Exercise:  [x] See flow sheet :   Rationale: increase ROM, increase strength and improve coordination to improve the patients ability to perform ADL     10 min Therapeutic Activity:  [x]  See flow sheet :   Rationale: increase ROM, increase strength and improve coordination  to improve the patients ability to perform ADL      10 min Neuromuscular Re-education:  [x]  See flow sheet :   Rationale: increase ROM, increase strength, improve coordination, improve balance and increase proprioception  to improve the patients ability to perform ADL     8 min Manual Therapy:  Checked alignment: left leg distraction   The manual therapy interventions were performed at a separate and distinct time from the therapeutic activities interventions. Rationale: decrease pain, increase ROM, increase tissue extensibility, decrease edema , decrease trigger points and increase postural awareness to perform ADL      min Gait Training:  ___ feet with ___ device on level surfaces with ___ level of assist   Rationale: With   [x] TE   [] TA   [] neuro   [] other: Patient Education: [x] Review HEP    [] Progressed/Changed HEP based on:   [] positioning   [] body mechanics   [] transfers   [] heat/ice application    [] other:      Other Objective/Functional Measures:  Left innominate posterior rotated    Pain Level (0-10 scale) post treatment: .5    ASSESSMENT/Changes in Function: Pt completed each there ex fairly well with cues. Following manual pain was decreased. Patient will continue to benefit from skilled PT services to address functional mobility deficits, address ROM deficits, address strength deficits, analyze and address soft tissue restrictions, analyze and cue movement patterns, analyze and modify body mechanics/ergonomics, assess and modify postural abnormalities and instruct in home and community integration to attain remaining goals.      []  See Plan of Care  []  See progress note/recertification  []  See Discharge Summary         Progress towards goals / Updated goals:  1. Pt will increase FOTO score to 66 points to improve ability to perform ADLs. PN: 52 2/15      2. Pt will increase AROM l/s EXT to 25-50% of WNL with minimal to no increased B LE pain to improve ability to stand with improved posture and with less pain.   PN : Progressin-50%, with report of pain in B LE  2/15     3 Pt will report having minimal to no increased LBP or B LE pain with prolonged sitting at work and with sit to stands to improve ability to perform work activities.   PN Progressing: states she has some pain with sitting, however it is not as intense as before, 02/10/21 same 2/15        PLAN  []  Upgrade activities as tolerated     []  Continue plan of care  []  Update interventions per flow sheet       []  Discharge due to:_  []  Other:_      Felicia Mccall, HAYLEE 2021  9:20 AM    Future Appointments   Date Time Provider Leanne Martin   3/3/2021  4:30 PM [facility]

## 2021-03-03 ENCOUNTER — HOSPITAL ENCOUNTER (OUTPATIENT)
Dept: PHYSICAL THERAPY | Age: 66
Discharge: HOME | End: 2021-03-03
Payer: COMMERCIAL

## 2021-03-03 PROCEDURE — 97530 THERAPEUTIC ACTIVITIES: CPT

## 2021-03-03 PROCEDURE — 97110 THERAPEUTIC EXERCISES: CPT

## 2021-03-03 NOTE — PROGRESS NOTES
PT DAILY TREATMENT NOTE     Patient Name: Gaby Palomares  Date:3/3/2021  : 1955  [x]  Patient  Verified  Payor: Gumaro Garcia / Plan: [employer]  CAPITATED PT / Product Type: Commerical /    In time: 4:42P Out time:5:15P  Total Treatment Time (min):33  Visit #: 4 of 4    Treatment Area: Low back pain [M54.5]    SUBJECTIVE  Pain Level (0-10 scale): 2.5  Any medication changes, allergies to medications, adverse drug reactions, diagnosis change, or new procedure performed?: [x] No    [] Yes (see summary sheet for update)  Subjective functional status/changes:   [] No changes reported  Pt subjectively reports a 75% improvement in overall condition, with report of continuing to be limited in sitting duration secondary to increased pain levels. OBJECTIVE    9 min Therapeutic Exercise:  [x] See flow sheet :   Rationale: increase ROM, increase strength and improve coordination to improve the patients ability to perform ADL     24 min Therapeutic Activity:  [x]  See flow sheet :FOTO, Reassessed goals   Rationale: increase ROM, increase strength and improve coordination  to improve the patients ability to perform ADL        With   [x] TE   [x] TA   [] neuro   [] other: Patient Education: [x] Review HEP    [] Progressed/Changed HEP based on:   [] positioning   [] body mechanics   [] transfers   [] heat/ice application    [] other:      Other Objective/Functional Measures:  See Goals below    Pain Level (0-10 scale) post treatment: 2.5    ASSESSMENT/Changes in Function:     Pt subjectively reports a 75% improvement in overall condition, with report of continuing to be limited in sitting duration secondary to increased pain levels. Despite decrease in FOTO score noted below, pt confirms that she continues to improve with participation in therapy sessions and she would like to continue.  Discussed compliance with HEP and the importance being on time to in person sessions in order to maximize treatment and overall therapeutic potential - pt verbally demonstrates knowledge and understanding of this. Objectively, pt demonstrates limitations in pain free lumbar AROM in addition to subjectively reporting limitations with sitting extended durations due to an increase in symptoms. Patient would continue to benefit from skilled PT services in order to address these deficits and return pt to PLOF of completing functional activities pain free. Reducing frequency of session to x1/wk to transition pt to independence with HEP and overall management of care - pt verbally agrees. Patient will continue to benefit from skilled PT services to address functional mobility deficits, address ROM deficits, address strength deficits, analyze and address soft tissue restrictions, analyze and cue movement patterns, analyze and modify body mechanics/ergonomics, assess and modify postural abnormalities and instruct in home and community integration to attain remaining goals. []  See Plan of Care  [x]  See progress note/recertification  []  See Discharge Summary         Progress towards goals / Updated goals:  1. Pt will increase FOTO score to 66 points to improve ability to perform ADLs. PN: 52   Current: Regressed, 41, 21      2. Pt will increase AROM l/s EXT to 25-50% of WNL with minimal to no increased B LE pain to improve ability to stand with improved posture and with less pain.   PN: Current: Progressin-50%, with report of pain in B LE   Current : Progressin% of WNL, with report of pain in B LE at end available range, 21     3 Pt will report having minimal to no increased LBP or B LE pain with prolonged sitting at work and with sit to stands to improve ability to perform work activities.   PN Progressing: states she has some pain with sitting, however it is not as intense as before, 02/10/21 same 2/15   Current: Remains:states she has some pain with sitting, however it is not as intense as before, 03/03/21       PLAN  [x]  Upgrade activities as tolerated     [x]  Continue plan of care  []  Update interventions per flow sheet       []  Discharge due to:_  []  Other:_      Jeff Randolph DPT 3/3/2021  9:20 AM    No future appointments.

## 2021-03-03 NOTE — PROGRESS NOTES
[facility] [id number] [facility], [address] [place] [address], [facility] [address]  [phone] fax    Physician Update  [x] Progress Note  [] Discharge Summary  Patient name: Gaby Palomares Start of Care: 2020   Referral source: Naveen Strong  : 1955   Medical/Treatment Diagnosis: Low back pain [M54.5]  Payor: Gumaro Garcia / Plan: [address] PT / Product Type: Commerical /  Onset Date: about 2 years ago     Prior Hospitalization: see medical history Provider#: [id number]   Medications: Verified on Patient Summary List    Comorbidities:  depression, visual impaired, osteoporosis, hx 2 c-sections  and   Prior Level of Function: independent with ADLs, functional, and work activities with no limitations. Visits from Start of Care: 16    Missed Visits: 1    Pt subjectively reports a 75% improvement in overall condition, with report of continuing to be limited in sitting duration secondary to increased pain levels. Despite decrease in FOTO score noted below, pt confirms that she continues to improve with participation in therapy sessions and she would like to continue. Discussed compliance with HEP and the importance being on time to in person sessions in order to maximize treatment and overall therapeutic potential - pt verbally demonstrates knowledge and understanding of this. Objectively, pt demonstrates limitations in pain free lumbar AROM in addition to subjectively reporting limitations with sitting extended durations due to an increase in symptoms. Patient would continue to benefit from skilled PT services in order to address these deficits and return pt to PLOF of completing functional activities pain free. Reducing frequency of session to x1/wk to transition pt to independence with HEP and overall management of care - pt verbally agrees. Progress towards Goals:   1.  Pt will increase FOTO score to 66 points to improve ability to perform ADLs.  PN: 52   Current: Regressed, 41, 21      2. Pt will increase AROM l/s EXT to 25-50% of WNL with minimal to no increased B LE pain to improve ability to stand with improved posture and with less pain. PN: Current: Progressin-50%, with report of pain in B LE   Current : Progressin% of WNL, with report of pain in B LE at end available range, 21     3 Pt will report having minimal to no increased LBP or B LE pain with prolonged sitting at work and with sit to stands to improve ability to perform work activities.   PN Progressing: states she has some pain with sitting, however it is not as intense as before, 02/10/21 same 2/15   Current: Remains:states she has some pain with sitting, however it is not as intense as before, 21    Goals: to be achieved in 4 weeks:  Continue with goals not met noted above    ASSESSMENT/RECOMMENDATIONS:  [x]Continue therapy per initial plan/protocol at a frequency of  1 x per week for 4 weeks  []Continue therapy with the following recommended changes:_____________________      _____________________________________________________________________  []Discontinue therapy progressing towards or have reached established goals  []Discontinue therapy due to lack of appreciable progress towards goals  []Discontinue therapy due to lack of attendance or compliance  []Await Physician's recommendations/decisions regarding therapy  []Other:________________________________________________________________    Thank you for this referral. Alexsandra Montesinos DPT 3/3/2021 6:09 PM  NOTE TO PHYSICIAN:  PLEASE COMPLETE THE ORDERS BELOW AND   FAX TO [facility]: [phone]  If you are unable to process this request in 24 hours please contact our office: [phone]    ? I have read the above report and request that my patient continue as recommended.   ? I have read the above report and request that my patient continue therapy with the following changes/special instructions:_____________________________________  ? I have read the above report and request that my patient be discharged from therapy.     [de-identified] Signature:____________Date:_________TIME:________    [employer], Date and Time must be completed for valid certification **

## 2021-03-17 ENCOUNTER — HOSPITAL ENCOUNTER (OUTPATIENT)
Dept: PHYSICAL THERAPY | Age: 66
Discharge: HOME | End: 2021-03-17
Payer: COMMERCIAL

## 2021-03-17 PROCEDURE — 97110 THERAPEUTIC EXERCISES: CPT

## 2021-03-17 PROCEDURE — 97530 THERAPEUTIC ACTIVITIES: CPT

## 2021-03-17 PROCEDURE — 97112 NEUROMUSCULAR REEDUCATION: CPT

## 2021-03-17 NOTE — PROGRESS NOTES
PT DAILY TREATMENT NOTE     Patient Name: Ernesto Bailey  Date:3/17/2021  : 1955  [x]  Patient  Verified  Payor: Harper Oiler / Plan: [employer]  [facility] / Product Type: Commerical /    In time: 3:47Ppm Out time: 4:29pm  Total Treatment Time (min): 42  Visit #: 1 of 4    Treatment Area: Low back pain [M54.5]    SUBJECTIVE  Pain Level (0-10 scale): 4  Any medication changes, allergies to medications, adverse drug reactions, diagnosis change, or new procedure performed?: [x] No    [] Yes (see summary sheet for update)  Subjective functional status/changes:   [] No changes reported  Pt reports increased pain levels in tailbone region upon arriving to today's session. States she was not able to attend in person sessions last week secondary to conflicting appts, however she has consistently performed HEP. Also, states she will be out of town next week and unable to attend in person session.     OBJECTIVE    25 min Therapeutic Exercise:  [x] See flow sheet :   Rationale: increase ROM, increase strength and improve coordination to improve the patients ability to perform ADLs with greater ease     8 min Therapeutic Activity:  [x]  See flow sheet :   Rationale: increase ROM, increase strength and improve coordination  to improve the patients ability to perform ADs with greater ease      9 min Neuromuscular Re-education:  [x]  See flow sheet :   Rationale: increase ROM, increase strength, improve coordination, improve balance and increase proprioception  to improve the patients ability to perform ADLs with greater ease          With   [x] TE   [] TA   [] neuro   [] other: Patient Education: [x] Review HEP    [] Progressed/Changed HEP based on:   [] positioning   [] body mechanics   [] transfers   [] heat/ice application    [] other:      Pain Level (0-10 scale) post treatment:0    ASSESSMENT/Changes in Function: Continued progression of activity program through advancing B LE dynamic strengthening and core stability ex. Pt tolerates well with no increase in symptoms and min cuing needed to correct technique. Leaves session with report of no pain. Ed on importance of attending in person sessions in order to reach LTGs - pt verbally demonstrates knowledge and understanding of this    Patient will continue to benefit from skilled PT services to address functional mobility deficits, address ROM deficits, address strength deficits, analyze and address soft tissue restrictions, analyze and cue movement patterns, assess and modify postural abnormalities and instruct in home and community integration to attain remaining goals. [x]  See Plan of Care  []  See progress note/recertification  []  See Discharge Summary         Progress towards goals / Updated goals:  1. Pt will increase FOTO score to 66 points to improve ability to perform ADLs. PN: Regressed, 41, 21      2. Pt will increase AROM l/s EXT to 25-50% of WNL with minimal to no increased B LE pain to improve ability to stand with improved posture and with less pain.   PN: Progressin% of WNL, with report of pain in B LE at end available range, 21     3 Pt will report having minimal to no increased LBP or B LE pain with prolonged sitting at work and with sit to stands to improve ability to perform work activities.   PN: Remains:states she has some pain with sitting, however it is not as intense as before, 21  Current:  Remains:states she continues to have pain with sitting/sit to stand transfers, 21    PLAN  [x]  Upgrade activities as tolerated     []  Continue plan of care  []  Update interventions per flow sheet       []  Discharge due to:_  []  Other:_      Fela Curry DPT 3/17/2021  5:47 PM    Future Appointments   Date Time Provider Leanne Martin   2021  6:00 PM Chito Guerrero [facility]   2021  5:15 PM Felicia Mccall PTA [facility]

## 2021-03-24 ENCOUNTER — OFFICE VISIT (OUTPATIENT)
Dept: ORTHOPEDIC SURGERY | Age: 66
End: 2021-03-24
Payer: COMMERCIAL

## 2021-03-24 VITALS
HEART RATE: 89 BPM | HEIGHT: 60 IN | TEMPERATURE: 96.9 F | OXYGEN SATURATION: 98 % | WEIGHT: 140.2 LBS | BODY MASS INDEX: 27.52 KG/M2

## 2021-03-24 DIAGNOSIS — S52.601A CLOSED FRACTURE OF DISTAL END OF RIGHT ULNA, UNSPECIFIED FRACTURE MORPHOLOGY, INITIAL ENCOUNTER: Primary | ICD-10-CM

## 2021-03-24 DIAGNOSIS — M25.531 RIGHT WRIST PAIN: ICD-10-CM

## 2021-03-24 PROCEDURE — 99203 OFFICE O/P NEW LOW 30 MIN: CPT | Performed by: ORTHOPAEDIC SURGERY

## 2021-03-24 PROCEDURE — 73110 X-RAY EXAM OF WRIST: CPT | Performed by: ORTHOPAEDIC SURGERY

## 2021-03-24 PROCEDURE — 25530 CLTX ULNAR SHFT FX W/O MNPJ: CPT | Performed by: ORTHOPAEDIC SURGERY

## 2021-03-24 RX ORDER — CYCLOSPORINE 0.5 MG/ML
1 EMULSION OPHTHALMIC EVERY 12 HOURS
COMMUNITY

## 2021-03-24 RX ORDER — CHOLECALCIFEROL (VITAMIN D3) 125 MCG
CAPSULE ORAL DAILY
COMMUNITY

## 2021-03-24 RX ORDER — TRAMADOL HYDROCHLORIDE 50 MG/1
50 TABLET ORAL
Qty: 28 TAB | Refills: 0 | Status: SHIPPED | OUTPATIENT
Start: 2021-03-24 | End: 2021-03-31

## 2021-03-24 NOTE — PROGRESS NOTES
Dereje Pathak  1955   Chief Complaint   Patient presents with    Hand Pain     right hand        HISTORY OF PRESENT ILLNESS  Dereje Pathak is a 63 y.o. female who presents today for evaluation of right wrist pain. She rates her pain 6/10 today. Pain has been present since 3/19/2021. Was climbing up the back of a truck and grabbed the tailgate to help her lift herself up. The tailgate went down while her arm was still on the tailgate. Presents today with a removable wrist brace. Has been taking 800 MG Ibuprofen and Tylenol. Patient describes the pain as aching that is Intermittent in nature. Symptoms are worse with ROM, Activity and is better with  NSAID. Associated symptoms include nothing. Since problem started, it: is unchanged. Pain does not wake patient up at night. Has taken Ibuprofen and Tylenol for the problem. Has tried following treatments: Injections:NO; Brace:NO;  Therapy:NO; Cane/Crutch:NO       No Known Allergies     Past Medical History:   Diagnosis Date    ADD (attention deficit disorder) without hyperactivity     Arthritis     knees    Depression     Migraine     UTI (urinary tract infection)       Social History     Socioeconomic History    Marital status:      Spouse name: Not on file    Number of children: Not on file    Years of education: Not on file    Highest education level: Not on file   Occupational History    Not on file   Social Needs    Financial resource strain: Not on file    Food insecurity     Worry: Not on file     Inability: Not on file    Transportation needs     Medical: Not on file     Non-medical: Not on file   Tobacco Use    Smoking status: Never Smoker    Smokeless tobacco: Never Used   Substance and Sexual Activity    Alcohol use: Yes     Comment: socially    Drug use: No    Sexual activity: Not on file   Lifestyle    Physical activity     Days per week: Not on file     Minutes per session: Not on file    Stress: Not on file Relationships    Social connections     Talks on phone: Not on file     Gets together: Not on file     Attends Baptism service: Not on file     Active member of club or organization: Not on file     Attends meetings of clubs or organizations: Not on file     Relationship status: Not on file    Intimate partner violence     Fear of current or ex partner: Not on file     Emotionally abused: Not on file     Physically abused: Not on file     Forced sexual activity: Not on file   Other Topics Concern    Not on file   Social History Narrative    Not on file      Past Surgical History:   Procedure Laterality Date    HX  SECTION  91 & 96    HX PELVIC LAPAROSCOPY      (+)endometriosis    HX TUBAL LIGATION      SC COLONOSCOPY FLX DX W/COLLJ [address] WHEN PFRMD  2009    normal ; dr. Twyla Tomlin      Family History   Problem Relation Age of Onset   [address] Hypertension Mother     Thyroid Disease Mother     Hypertension Father     Heart Attack Father     Other Father         circulation problems    Cancer Father     Colon Polyps Father       Current Outpatient Medications   Medication Sig    cholecalciferol, vitamin D3, (Vitamin D3) 50 mcg (2,000 unit) tab Take  by mouth daily.  cycloSPORINE (RESTASIS) 0.05 % dpet Administer 1 Drop to both eyes every twelve (12) hours.  alendronate sodium (FOSAMAX PO) Take 70 mg by mouth every seven (7) days.  ibuprofen (MOTRIN) 800 mg tablet Take  by mouth.  calcium carbonate-vitamin d2 500 mg(1,250mg) -200 unit Tab Take 1 Tab by mouth daily.  MULTIVITAMIN/IRON/FOLIC ACID (CENTRUM COMPLETE PO) Take  by mouth daily.  meloxicam (Mobic) 15 mg tablet Take 1 Tab by mouth daily (with breakfast).  buPROPion SR (WELLBUTRIN SR) 150 mg SR tablet Take  by mouth.  Take 1 to 2 tablets daily    FLUoxetine (PROZAC) 20 mg capsule TAKE 1 CAPSULE BY MOUTH ONCE DAILY    amphetamine-dextroamphetamine XR (ADDERALL XR) 30 mg XR capsule Take 30 mg by mouth every morning.  estradiol (VAGIFEM) 10 mcg Tab vaginal tablet Insert 10 mcg into vagina every Tuesday and Friday. No current facility-administered medications for this visit. REVIEW OF SYSTEM   Patient denies: Weight loss, Fever/Chills, HA, Visual changes, Fatigue, Chest pain, SOB, Abdominal pain, N/V/D/C, Blood in stool or urine, Edema. Pertinent positive as above in HPI. All others were negative    PHYSICAL EXAM:   Visit Vitals  Pulse 89   Temp 96.9 °F (36.1 °C) (Temporal)   Ht 5' (1.524 m)   Wt 140 lb 3.2 oz (63.6 kg)   SpO2 98%   BMI 27.38 kg/m²     The patient is a well-developed, well-nourished female   in no acute distress. The patient is alert and oriented times three. The patient is alert and oriented times three. Mood and affect are normal.  LYMPHATIC: lymph nodes are not enlarged and are within normal limits  SKIN: normal in color and non tender to palpation. There are no bruises or abrasions noted. NEUROLOGICAL: Motor sensory exam is within normal limits. Reflexes are equal bilaterally.  There is normal sensation to pinprick and light touch  MUSCULOSKELETAL:   Examination Right Hand   Skin Intact   Deformity -   Swelling -   Tenderness -   Tenderness A1 Pulley -   Finger flexion Full   Finger extension Full   Thenar Eminence Atrophy -   Sensation Normal   Capillary refill -   Heberden's nodes -   Dupuytren's -     Examination Right Wrist   Skin bruising   Tenderness ++distal ulna   Flexion 20   Extension 20   Deformity -   Effusion -   Tinnel's sign -   Phalen's test -   Finklestein maneuver -   Pain with thumb abduction -       IMAGING: XR of right wrist with 3 views obtained in the office dated 3/24/2021 was reviewed and read by Dr. Chery Nguyen: Nondisplaced fracture of the distal third of the ulna        XR of right forearm with 2 views from Urgent Care in [place] dated 3/20/2021 was reviewed and read by Dr. Chery Nguyen: Nondisplaced distal third ulna fracture      IMPRESSION: ICD-10-CM ICD-9-CM    1. Closed fracture of distal end of right ulna, unspecified fracture morphology, initial encounter  S52.601A 813.43 MT APPLY FOREARM CAST      CAST SUP SHT ARM SPLINT FBRG      traMADoL (ULTRAM) 50 mg tablet   2. Right wrist pain  M25.531 719.43 AMB POC XRAY, WRIST; COMPLETE, 3+ VIE        PLAN:  1. Pt presents today with right wrist pain due to a nondisplaced distal ulna fracture and a short arm cast was applied in the office today. Stressed no pushing, pulling, grabbing or lifting. Will see her back in the office in 1 week for repeat XR. Was also given prescription for Tramadol today. Risk factors include: n/a  2. No ultrasound exam indicated today  3. No cortisone injection indicated today   4. No Physical/Occupational Therapy indicated today  5. No diagnostic test indicated today:   6. Yes durable medical equipment indicated today SHORT ARM CAST  7. No referral indicated today   8. Yes medications indicated today: TRAMADOL  9. Yes Narcotic indicated today Patient given pain medication for short term acute pain relief. Goal is to treat patient according to above plan and to ultimately have patient off all pain medications once appropriate. If chronic pain management is required beyond what is expected for current orthopedic problem, will refer patient to pain management.  was reviewed and will be reviewed with every medication refill request.         RTC 1 week for repeat XR      Scribed by Angelita Sharif) as dictated by Dane Locke MD    I, Dr. Dane Locke, confirm that all documentation is accurate.     Dane Locke M.D.   [facility]

## 2021-03-30 ENCOUNTER — OFFICE VISIT (OUTPATIENT)
Dept: ORTHOPEDIC SURGERY | Age: 66
End: 2021-03-30
Payer: COMMERCIAL

## 2021-03-30 VITALS
WEIGHT: 138.6 LBS | HEART RATE: 91 BPM | BODY MASS INDEX: 27.21 KG/M2 | TEMPERATURE: 97.3 F | HEIGHT: 60 IN | OXYGEN SATURATION: 97 %

## 2021-03-30 DIAGNOSIS — S52.601D CLOSED FRACTURE OF DISTAL END OF RIGHT ULNA WITH ROUTINE HEALING, UNSPECIFIED FRACTURE MORPHOLOGY, SUBSEQUENT ENCOUNTER: Primary | ICD-10-CM

## 2021-03-30 PROCEDURE — 99213 OFFICE O/P EST LOW 20 MIN: CPT | Performed by: ORTHOPAEDIC SURGERY

## 2021-03-30 PROCEDURE — 73110 X-RAY EXAM OF WRIST: CPT | Performed by: ORTHOPAEDIC SURGERY

## 2021-03-30 PROCEDURE — 29075 APPL CST ELBW FNGR SHORT ARM: CPT | Performed by: ORTHOPAEDIC SURGERY

## 2021-03-30 NOTE — PROGRESS NOTES
James Muse  1955   Chief Complaint   Patient presents with    Arm Pain     right arm        HISTORY OF PRESENT ILLNESS  James Muse is a 63 y.o. female who presents today for reevaluation of right wrist. Patient rates pain as 7/10 today. Pain has been present since 3/19/2021. Was climbing up the back of a truck and grabbed the tailgate to help her lift herself up. The tailgate went down while her arm was still on the tailgate. Presents today wearing a short arm cast. Does note that she \"eva\" her arm on 3/27/2021 when her  was attempting to cover her arm with a bag so that she could shower with her cast. Has been taking Tramadol, Ibuprofen, and Tylenol. Patient denies any fever, chills, chest pain, shortness of breath or calf pain. The remainder of the review of systems is negative. There are no new illness or injuries to report since last seen in the office. There are no changes to medications, allergies, family or social history. Pain Assessment  3/30/2021   Location of Pain Arm   Pain Location Comment -   Location Modifiers Right   Severity of Pain 7   Quality of Pain Throbbing;Dull;Aching   Duration of Pain A few hours   Frequency of Pain Several times daily   Date Pain First Started -   Aggravating Factors Other (Comment)   Aggravating Factors Comment ROM, lifting   Limiting Behavior Some   Relieving Factors NSAID;Other (Comment)   Relieving Factors Comment tylenol, motrin, rx pain medication   Result of Injury -   Work-Related Injury -       PHYSICAL EXAM:   Visit Vitals  Pulse 91   Temp 97.3 °F (36.3 °C) (Temporal)   Ht 5' (1.524 m)   Wt 138 lb 9.6 oz (62.9 kg)   SpO2 97%   BMI 27.07 kg/m²     The patient is a well-developed, well-nourished female   in no acute distress. The patient is alert and oriented times three. The patient is alert and oriented times three.  Mood and affect are normal.  LYMPHATIC: lymph nodes are not enlarged and are within normal limits  SKIN: normal in color and non tender to palpation. There are no bruises or abrasions noted. NEUROLOGICAL: Motor sensory exam is within normal limits. Reflexes are equal bilaterally. There is normal sensation to pinprick and light touch  MUSCULOSKELETAL:  Examination Right Hand   Skin Intact   Deformity -   Swelling -   Tenderness -   Tenderness A1 Pulley -   Finger flexion Full   Finger extension Full   Thenar Eminence Atrophy -   Sensation Normal   Capillary refill -   Heberden's nodes -   Dupuytren's -      Examination Right Wrist   Skin bruising   Tenderness ++distal ulna   Flexion 20   Extension 20   Deformity -   Effusion -   Tinnel's sign -   Phalen's test -   Finklestein maneuver -   Pain with thumb abduction -        IMAGING: XR of right wrist with 3 views obtained in the office dated 3/30/2021 was reviewed and read by  [facility]: Distal ulna fracture with no change in position        XR of right wrist with 3 views obtained in the office dated 3/24/2021 was reviewed and read by  [facility] [place]: Nondisplaced fracture of the distal third of the ulna           XR of right forearm with 2 views from Urgent Care in [place] dated 3/20/2021 was reviewed and read by  [facility] [place]: Nondisplaced distal third ulna fracture      IMPRESSION:      ICD-10-CM ICD-9-CM    1. Closed fracture of distal end of right ulna with routine healing, unspecified fracture morphology, subsequent encounter  S52.601D V54.12 AMB POC XRAY, WRIST; COMPLETE, 3+ VIE      CAST SUP SHT ARM SPLINT FBRG      AR APPLY FOREARM CAST        PLAN:   1. Pt presents today with right wrist pain due to a nondisplaced distal ulna fracture and a new short arm cast was applied in the office today. Will see her back in 2 weeks. Risk factors include: n/a  2. No ultrasound exam indicated today  3. No cortisone injection indicated today   4. No Physical/Occupational Therapy indicated today  5. No diagnostic test indicated today:   6.  Yes durable medical equipment indicated today SHORT ARM CAST  7. No referral indicated today   8. No medications indicated today:   9. No Narcotic indicated today      RTC 2 weeks      Scribed by Lino Johnson [address]) as dictated by MD NICANOR Alfaro, Dr. Cristy Centeno, confirm that all documentation is accurate.     Cristy Centeno M.D.   [facility]

## 2021-04-01 ENCOUNTER — HOSPITAL ENCOUNTER (OUTPATIENT)
Dept: PHYSICAL THERAPY | Age: 66
Discharge: HOME | End: 2021-04-01
Payer: COMMERCIAL

## 2021-04-01 PROCEDURE — 97112 NEUROMUSCULAR REEDUCATION: CPT

## 2021-04-01 PROCEDURE — 97110 THERAPEUTIC EXERCISES: CPT

## 2021-04-01 PROCEDURE — 97530 THERAPEUTIC ACTIVITIES: CPT

## 2021-04-01 NOTE — PROGRESS NOTES
PT DAILY TREATMENT NOTE     Patient Name: Rigo Alexander  Date:2021  : 1955  [x]  Patient  Verified  Payor: Graciela Bojorquez / Plan: [facility]  [facility] / Product Type: Commerical /    In time:6:03  Out time:6:41  Total Treatment Time (min): 38  Visit #: 2 of 4    Medicare/BCBS Only   Total Timed Codes (min):   1:1 Treatment Time:         Treatment Area: Low back pain [M54.5]    SUBJECTIVE  Pain Level (0-10 scale): 3  Any medication changes, allergies to medications, adverse drug reactions, diagnosis change, or new procedure performed?: [x] No    [] Yes (see summary sheet for update)  Subjective functional status/changes:   [] No changes reported  \"I wondering how many visit I would have for my wrist.\"    OBJECTIVE    Modality rationale: decrease edema, decrease inflammation, decrease pain, increase tissue extensibility and increase muscle contraction/control to improve the patients ability to perform ADL    Min Type Additional Details    [] Estim:  []Unatt       []IFC  []Premod                        []Other:  []w/ice   []w/heat  Position:  Location:    [] Estim: []Att    []TENS instruct  []NMES                    []Other:  []w/US   []w/ice   []w/heat  Position:  Location:    []  Traction: [] Cervical       []Lumbar                       [] Prone          []Supine                       []Intermittent   []Continuous Lbs:  [] before manual  [] after manual    []  Ultrasound: []Continuous   [] Pulsed                           []1MHz   []3MHz W/cm2:  Location:    []  Iontophoresis with dexamethasone         Location: [] Take home patch   [] In clinic    []  Ice     []  heat  []  Ice massage  []  Laser   []  Anodyne Position:  Location:    []  Laser with stim  []  Other:  Position:  Location:    []  Vasopneumatic Device Pressure:       [] lo [] med [] hi   Temperature: [] lo [] med [] hi   [x] Skin assessment post-treatment:  [x]intact []redness- no adverse reaction    []redness  adverse reaction: min []Eval                  []Re-Eval       22 min Therapeutic Exercise:  [x] See flow sheet :   Rationale: increase ROM, increase strength and improve coordination to improve the patients ability to perform ADL     8 min Therapeutic Activity:  [x]  See flow sheet :   Rationale: increase ROM, increase strength and improve coordination  to improve the patients ability to perform ADL      8 min Neuromuscular Re-education:  [x]  See flow sheet :   Rationale: increase ROM, increase strength, improve coordination, improve balance and increase proprioception  to improve the patients ability to perform ADL      min Manual Therapy: The manual therapy interventions were performed at a separate and distinct time from the therapeutic activities interventions. Rationale: decrease pain, increase ROM, increase tissue extensibility, decrease edema , decrease trigger points and increase postural awareness to perform ADL      min Gait Training:  ___ feet with ___ device on level surfaces with ___ level of assist   Rationale: With   [x] TE   [] TA   [] neuro   [] other: Patient Education: [x] Review HEP    [] Progressed/Changed HEP based on:   [] positioning   [] body mechanics   [] transfers   [] heat/ice application    [] other:      Other Objective/Functional Measures:      Pain Level (0-10 scale) post treatment: 2    ASSESSMENT/Changes in Function: Pt completed each there ex fairly well. Pt continued with residual pain following there ex but better. Patient will continue to benefit from skilled PT services to address functional mobility deficits, address ROM deficits, address strength deficits, analyze and address soft tissue restrictions, analyze and cue movement patterns, analyze and modify body mechanics/ergonomics, assess and modify postural abnormalities and instruct in home and community integration to attain remaining goals.      []  See Plan of Care  [x]  See progress note/recertification  []  See Discharge Summary         Progress towards goals / Updated goals:  1. Pt will increase FOTO score to 66 points to improve ability to perform ADLs. PN: Regressed, 41, 21      2. Pt will increase AROM l/s EXT to 25-50% of WNL with minimal to no increased B LE pain to improve ability to stand with improved posture and with less pain.   PN: Progressin% of WNL, with report of pain in B LE at end available range, 21     3 Pt will report having minimal to no increased LBP or B LE pain with prolonged sitting at work and with sit to stands to improve ability to perform work activities.   PN: Remains:states she has some pain with sitting, however it is not as intense as before, 21  Current:  Remains:states she continues to have pain with sitting/sit to stand transfers, 21    PLAN  [x]  Upgrade activities as tolerated     []  Continue plan of care  []  Update interventions per flow sheet       []  Discharge due to:_  [x]  Other:_  POSSIBLE DC  VANESA Mccall PTA 2021  6:11 PM    Future Appointments   Date Time Provider Leanne Martin   2021  5:15 PM Beny Baum PTA [facility]   2021  4:20 PM Vy Reis MD [facility] BS AMB

## 2021-04-08 ENCOUNTER — HOSPITAL ENCOUNTER (OUTPATIENT)
Dept: PHYSICAL THERAPY | Age: 66
Discharge: HOME | End: 2021-04-08
Payer: COMMERCIAL

## 2021-04-08 PROCEDURE — 97530 THERAPEUTIC ACTIVITIES: CPT

## 2021-04-08 PROCEDURE — 97110 THERAPEUTIC EXERCISES: CPT

## 2021-04-08 NOTE — PROGRESS NOTES
[facility] PT DAILY TREATMENT NOTE     Patient Name: Colin Booth  Date:2021  : 1955  [x]  Patient  Verified  Payor: Mikal Wilkins / Plan: [facility]  [facility] / Product Type: Commerical /    In time:5:18  Out time:5:56  Total Treatment Time (min):  38  Visit #: 3 of 4    Medicare/BCBS Only   Total Timed Codes (min):   1:1 Treatment Time:         Treatment Area: Low back pain [M54.5]    SUBJECTIVE  Pain Level (0-10 scale):4  Any medication changes, allergies to medications, adverse drug reactions, diagnosis change, or new procedure performed?: [x] No    [] Yes (see summary sheet for update)  Subjective functional status/changes:   [] No changes reported  \"I feel good when Im here but when I get home later the pain begins. \"    OBJECTIVE    Modality rationale: decrease edema, decrease inflammation, decrease pain, increase tissue extensibility and increase muscle contraction/control to improve the patients ability to perform ADL    Min Type Additional Details    [] Estim:  []Unatt       []IFC  []Premod                        []Other:  []w/ice   []w/heat  Position:  Location:    [] Estim: []Att    []TENS instruct  []NMES                    []Other:  []w/US   []w/ice   []w/heat  Position:  Location:    []  Traction: [] Cervical       []Lumbar                       [] Prone          []Supine                       []Intermittent   []Continuous Lbs:  [] before manual  [] after manual    []  Ultrasound: []Continuous   [] Pulsed                           []1MHz   []3MHz W/cm2:  Location:    []  Iontophoresis with dexamethasone         Location: [] Take home patch   [] In clinic    []  Ice     []  heat  []  Ice massage  []  Laser   []  Anodyne Position:  Location:    []  Laser with stim  []  Other:  Position:  Location:    []  Vasopneumatic Device Pressure:       [] lo [] med [] hi   Temperature: [] lo [] med [] hi   [x] Skin assessment post-treatment:  [x]intact []redness- no adverse reaction    []redness  adverse reaction:      min []Eval                  []Re-Eval       28 min Therapeutic Exercise:  [] See flow sheet :   Rationale: increase ROM and increase strength to improve the patients ability to perform ADL     10 min Therapeutic Activity:  []  See flow sheet :   Rationale: increase ROM, increase strength and improve coordination  to improve the patients ability to perform ADL       min Neuromuscular Re-education:  []  See flow sheet :   Rationale: increase ROM, increase strength, improve coordination, improve balance and increase proprioception  to improve the patients ability to perform ADL      min Manual Therapy: The manual therapy interventions were performed at a separate and distinct time from the therapeutic activities interventions. Rationale: decrease pain, increase ROM, increase tissue extensibility, decrease edema , decrease trigger points and increase postural awareness to perform ADL     min Gait Training:  ___ feet with ___ device on level surfaces with ___ level of assist   Rationale: With   [] TE   [] TA   [] neuro   [] other: Patient Education: [x] Review HEP    [] Progressed/Changed HEP based on:   [] positioning   [] body mechanics   [] transfers   [] heat/ice application    [] other:      Other Objective/Functional Measures:      Pain Level (0-10 scale) post treatment: 2-3    ASSESSMENT/Changes in Function: Pt completed each there ex fairly well. Pt was concerned if she should be discharged for her LB so that she can start Pt for her wrist. Therapist told pt its up to her due to that her insurance only cover 30 visit per calendar year.     Patient will continue to benefit from skilled PT services to address functional mobility deficits, address ROM deficits, address strength deficits, analyze and address soft tissue restrictions, analyze and cue movement patterns, analyze and modify body mechanics/ergonomics, assess and modify postural abnormalities and instruct in home and community integration to attain remaining goals. [x]  See Plan of Care  []  See progress note/recertification  []  See Discharge Summary         Progress towards goals / Updated goals:  1. Pt will increase FOTO score to 66 points to improve ability to perform ADLs. PN: Regressed, 41, 21      2. Pt will increase AROM l/s EXT to 25-50% of WNL with minimal to no increased B LE pain to improve ability to stand with improved posture and with less pain.   PN: Progressin% of WNL, with report of pain in B LE at end available range, 21     3 Pt will report having minimal to no increased LBP or B LE pain with prolonged sitting at work and with sit to stands to improve ability to perform work activities.   PN: Remains:states she has some pain with sitting, however it is not as intense as before, 21  Current:  Remains:states she continues to have pain with sitting/sit to stand transfers, 21    PLAN  []  Upgrade activities as tolerated     []  Continue plan of care  []  Update interventions per flow sheet       []  Discharge due to:_  [x]  Other:_  DC  VANESA Mccall, PTA 2021  5:20 PM    Future Appointments   Date Time Provider Leanne Martin   2021  4:20 PM Marty Wheeler MD [facility] BS AMB

## 2021-04-14 ENCOUNTER — OFFICE VISIT (OUTPATIENT)
Dept: ORTHOPEDIC SURGERY | Age: 66
End: 2021-04-14

## 2021-04-14 VITALS
HEIGHT: 60 IN | TEMPERATURE: 97.1 F | WEIGHT: 135 LBS | BODY MASS INDEX: 26.5 KG/M2 | HEART RATE: 75 BPM | RESPIRATION RATE: 16 BRPM | OXYGEN SATURATION: 97 %

## 2021-04-14 DIAGNOSIS — M25.531 RIGHT WRIST PAIN: ICD-10-CM

## 2021-04-14 DIAGNOSIS — S52.601D CLOSED FRACTURE OF DISTAL END OF RIGHT ULNA WITH ROUTINE HEALING, UNSPECIFIED FRACTURE MORPHOLOGY, SUBSEQUENT ENCOUNTER: Primary | ICD-10-CM

## 2021-04-14 PROCEDURE — 29075 APPL CST ELBW FNGR SHORT ARM: CPT | Performed by: ORTHOPAEDIC SURGERY

## 2021-04-14 PROCEDURE — 73110 X-RAY EXAM OF WRIST: CPT | Performed by: ORTHOPAEDIC SURGERY

## 2021-04-14 PROCEDURE — 99024 POSTOP FOLLOW-UP VISIT: CPT | Performed by: ORTHOPAEDIC SURGERY

## 2021-04-21 ENCOUNTER — HOSPITAL ENCOUNTER (OUTPATIENT)
Dept: PHYSICAL THERAPY | Age: 66
Discharge: HOME | End: 2021-04-21
Payer: COMMERCIAL

## 2021-04-21 PROCEDURE — 97110 THERAPEUTIC EXERCISES: CPT

## 2021-04-21 PROCEDURE — 97530 THERAPEUTIC ACTIVITIES: CPT

## 2021-04-21 NOTE — PROGRESS NOTES
PT DISCHARGE DAILY NOTE AND [id number]    Patient name: Hammad Greene Start of Care: 2020   Referral source: Savannah Byrnes DO : 1955   Medical/Treatment Diagnosis: Low back pain [M54.5]  Payor: Reddy Kina / Plan: [facility] / Product Type: Commerical /  Onset Date: about 2 years ago      Prior Hospitalization: see medical history Provider#: [id number]   Medications: Verified on Patient Summary List     Comorbidities:  depression, visual impaired, osteoporosis, hx 2 c-sections  and   Prior Level of Function: independent with ADLs, functional, and work activities with no limitations. Visits from Start of Care: 20    Missed Visits: 1  Reporting Period : 3/3/2021 to 2021    Date:2021  : 1955  [x]  Patient  Verified  Payor: Reddy Kina / Plan: [facility] / Product Type: Commerical /    In time: 6:03   Out time:6:39  Total Treatment Time (min): 36  Visit #: 4 of 4    SUBJECTIVE  Pain Level (0-10 scale): 3  Any medication changes, allergies to medications, adverse drug reactions, diagnosis change, or new procedure performed?: [x] No    [] Yes (see summary sheet for update)  Subjective functional status/changes:   [] No changes reported  Pt reports she continues to have pain in her back and legs. OBJECTIVE    15 min Therapeutic Exercise:  [] See flow sheet : HEP instructions and demonstrations   Rationale: increase ROM and increase strength to improve the patients ability to tolerate ADLs    21 min Therapeutic Activity:  []  See flow sheet : d/c instructions, goal assessment FOTO with pt. Rationale: increase strength, improve coordination, improve balance and increase proprioception  to improve the patients ability to tolerate functional tasks.            With   [x] TE   [x] TA   [] neuro   [] other: Patient Education: [x] Review HEP    [x] Progressed/Changed HEP based on:   [x] positioning   [x] body mechanics   [] transfers   [] heat/ice application    [x] other: HEP      Other Objective/Functional Measures: See goals below. Pain Level (0-10 scale) post treatment: 3    Summary of Care:  Goal: Pt will increase FOTO score to 66 points to improve ability to perform ADLs. Status at last note/certification: not met, 56 points  Status at discharge: not met    Goal: Pt will increase AROM l/s EXT to 25-50% of WNL with minimal to no increased B LE pain to improve ability to stand with improved posture and with less pain. Status at last note/certification: not met, 75% of WNL with mild LBP and B HS origins. Status at discharge: not met    Goal: Pt will report having minimal to no increased LBP or B LE pain with prolonged sitting at work and with sit to stands to improve ability to perform work activities.   Status at last note/certification: not met, reports that the pain is better with sitting but still gets the pain with the sit to stands after prolonged sitting 4/21/2021  Status at discharge: not met    ASSESSMENT/Changes in Function:   Pt was seen for 20 therapy sessions. Pt has attended limited therapy sessions over the past few weeks secondary to a forearm/wrist injury. She continues to have pain in her back with sitting and with daily activities. Pt given updated HEP to perform with green tband, and educated pt on importance of performing HEP to help improve pain and symptoms independently. Pt is d/c'ed from therapy at this time to HEP secondary to lack of progress and ability to independently perform HEP to manage current deficits.      Thank you for this referral!      PLAN  [x]Discontinue therapy: []Patient has reached or is progressing toward set goals      []Patient is non-compliant or has abdicated      [x]Due to lack of appreciable progress towards set goals    Angie Mcarthur, PT 4/21/2021  6:06 PM

## 2021-04-21 NOTE — PROGRESS NOTES
Physical Therapy Discharge Instructions    [facility] [address], [address] [place] [address], [id number] [address]  [phone] fax    Patient: Latisha Hodgkins  : 1955      Continue Home Exercise Program 3-4 times per week. Continue with    [x] Heat  as needed, 15-20 minutes.       Follow up with MD:     [x] As needed      Recommendations:   [x]   Return to activity with home program        Mary Jane Allison, PT 2021 6:25 PM

## 2021-05-05 ENCOUNTER — OFFICE VISIT (OUTPATIENT)
Dept: ORTHOPEDIC SURGERY | Age: 66
End: 2021-05-05
Payer: COMMERCIAL

## 2021-05-05 VITALS
WEIGHT: 135 LBS | TEMPERATURE: 98.1 F | HEART RATE: 85 BPM | HEIGHT: 60 IN | OXYGEN SATURATION: 96 % | BODY MASS INDEX: 26.5 KG/M2

## 2021-05-05 DIAGNOSIS — S52.601D CLOSED FRACTURE OF DISTAL END OF RIGHT ULNA WITH ROUTINE HEALING, UNSPECIFIED FRACTURE MORPHOLOGY, SUBSEQUENT ENCOUNTER: Primary | ICD-10-CM

## 2021-05-05 PROCEDURE — 99024 POSTOP FOLLOW-UP VISIT: CPT | Performed by: ORTHOPAEDIC SURGERY

## 2021-05-05 PROCEDURE — 73110 X-RAY EXAM OF WRIST: CPT | Performed by: ORTHOPAEDIC SURGERY

## 2021-05-05 NOTE — PROGRESS NOTES
Henry Colon  1955   Chief Complaint   Patient presents with    Wrist Pain     right follow up        [facility]  Henry Colon is a 63 y.o. female who presents today for reevaluation of right wrist. Patient rates pain as 0/10 today. Pain has been present since 3/19/2021. Was climbing up the back of a truck and grabbed the tailgate to help her lift herself up. The tailgate went down while her arm was still on the tailgate. Presents today wearing a short arm cast. Does note that she \"eva\" her arm on 3/27/2021 when her  was attempting to cover her arm with a bag so that she could shower with her cast. Has tried taking Tramadol, Ibuprofen, and Tylenol. She notes some stiffness today. Patient denies any fever, chills, chest pain, shortness of breath or calf pain. The remainder of the review of systems is negative. There are no new illness or injuries to report since last seen in the office. There are no changes to medications, allergies, family or social history. PHYSICAL EXAM:   Visit Vitals  Pulse 85   Temp 98.1 °F (36.7 °C) (Oral)   Ht 5' (1.524 m)   Wt 135 lb (61.2 kg)   SpO2 96%   BMI 26.37 kg/m²     The patient is a well-developed, well-nourished female   in no acute distress. The patient is alert and oriented times three. The patient is alert and oriented times three. Mood and affect are normal.  LYMPHATIC: lymph nodes are not enlarged and are within normal limits  SKIN: normal in color and non tender to palpation. There are no bruises or abrasions noted. NEUROLOGICAL: Motor sensory exam is within normal limits. Reflexes are equal bilaterally.  There is normal sensation to pinprick and light touch  MUSCULOSKELETAL:  Examination Right Hand   Skin Intact   Deformity -   Swelling -   Tenderness -   Tenderness A1 Pulley -   Finger flexion Full   Finger extension Full   Thenar Eminence Atrophy -   Sensation Normal   Capillary refill -   Heberden's nodes -   Dupuytren's -    Examination Right Wrist   Skin bruising   Tenderness +distal ulna   Flexion 20   Extension 20   Deformity -   Effusion -   Tinnel's sign -   Phalen's test -   Finklestein maneuver -   Pain with thumb abduction -        IMAGING: XR of right wrist with 3 views obtained in the office dated 5/05/2021 was reviewed and read by Dr. Lopez Mail: no change in position of the distal ulna fracture, callus fromation noted        XR of right wrist with 3 views obtained in the office dated 4/14/2021 was reviewed and read by Dr. Lopez Mail: minimal change in position at the fracture site, no callus formation        XR of right wrist with 3 views obtained in the office dated 3/30/2021 was reviewed and read by Dr. Lopez Mail: Distal ulna fracture with no change in position        XR of right wrist with 3 views obtained in the office dated 3/24/2021 was reviewed and read by Dr. Lopez Mail: Nondisplaced fracture of the distal third of the ulna           XR of right forearm with 2 views from Urgent Care in [place] dated 3/20/2021 was reviewed and read by Dr. Lopez Mail: Nondisplaced distal third ulna fracture      IMPRESSION:      ICD-10-CM ICD-9-CM    1. Closed fracture of distal end of right ulna with routine healing, unspecified fracture morphology, subsequent encounter  S52.601D V54.12 AMB POC XRAY, WRIST; COMPLETE, 3+ VIE      REFERRAL TO OCCUPATIONAL THERAPY      AMB SUPPLY ORDER        PLAN:   1. Pt presents today with right wrist pain due to a distal ulna fracture. Short arm cast was removed in the office today, was given a removable wrist splint. Will also set up with OT and will see her back in 3 weeks. Risk factors include: n/a  2. No ultrasound exam indicated today  3. No cortisone injection indicated today   4. Yes Occupational Therapy indicated today  5. No diagnostic test indicated today:   6. Yes durable medical equipment indicated today REMOVABLE WRIST SPLINT  7. No referral indicated today   8.  No medications indicated today: 9. No Narcotic indicated today      RTC 3 weeks      Scribed by Elieezr Esquivel [address] [place] [address] [address]) as dictated by Liz Venegas MD    I, Dr. Liz Venegas, confirm that all documentation is accurate.     Liz Venegas M.D.   [facility]

## 2021-05-19 ENCOUNTER — HOSPITAL ENCOUNTER (OUTPATIENT)
Dept: PHYSICAL THERAPY | Age: 66
Discharge: HOME | End: 2021-05-19
Attending: ORTHOPAEDIC SURGERY
Payer: COMMERCIAL

## 2021-05-19 PROCEDURE — 97165 OT EVAL LOW COMPLEX 30 MIN: CPT

## 2021-05-19 PROCEDURE — 97110 THERAPEUTIC EXERCISES: CPT

## 2021-05-19 NOTE — PROGRESS NOTES
OT DAILY TREATMENT NOTE     Patient Name: Alexx Little  Date:2021  : 1955  [x]  Patient  Verified  Payor: Violeta Chavez / Plan: [facility] PT / Product Type: Commerical /    In time:820  Out time:900  Total Treatment Time (min): 40  Visit #: 1 of 12    Treatment Area: Unspecified fracture of lower end of right ulna, subsequent encounter for closed fracture with routine healing [E76.790R]    SUBJECTIVE  Pain Level (0-10 scale): 1/10  Any medication changes, allergies to medications, adverse drug reactions, diagnosis change, or new procedure performed?: [x] No    [] Yes (see summary sheet for update)  Subjective functional status/changes:   [] No changes reported  Have not been using hand much    OBJECTIVE        17 min []Eval                  []Re-Eval       23 min Therapeutic Exercise:  [] See flow sheet :   Rationale: increase ROM to improve the patients ability to movement of right wrist  Right  Wrist flex ext no and with fist x 10  RD/UD x 10          With   [] TE   [] TA   [] neuro   [] other: Patient Education: [x] Review HEP    [] Progressed/Changed HEP based on: Wrist ROm, skin care  [] positioning   [] body mechanics   [] transfers   [] heat/ice application   [] Splint wear/care   [] Sensory re-education   [] scar management      [] other:            Other Objective/Functional Measures:   Subjective: pt is a right hand dominant, 63 y.o.y/o, female who sustained his/her injury when tailgate fell on wrist, Had X ray next day and saw MD in area 3/24/21 and was casted until early May. Now in velcro brace.      Prior level of function: [facility], camp, I self care, home care, driving, grandkids, [employer]  Pain level:(0-no pain 10-debilitating pain) mild    Description/Location: right wrist with c/o intermittent relief   Worst pain8/10 Least pain 0/10   Activities which aggravate pain: use   Activities which ease pain: brace  Current functional limitations/living situation: With spouse, using left to eat, not doing much cooking, opening jars, packages    Medical hx: arthritis , osteoporosis, depression    Medications: See the written copy of this report in the patient's paper medical record.        Objective:  Edema: Circumference    Right  Left   Styolid Level  15.4cm  14cm     Sensation:No changes noted  Range of Motion:     Active      Norms Right Left                                                           Forearm Supination 0-80 80 NT      Pronation 0-80 90 NT     Wrist Flex 0-80 40 65      Ext 0-70 40 78      Ulnar Dev 0-30 22 30      Radial Dev 0-20 13 20       Hand ROM WNL  Hand Strength:   Gross Grasp 3pt Pinch Lateral Pinch Tip Pinch          Left 40 10 10 6     Nine-Hole Peg Test:  Left= __19___seconds  Right=_19____seconds  Finger Opposition:WNL    ADLs  Feeding:        []MaxA   []ModA   [x]Randy   [] CGA   []SBA   []Jacqueline   []Independent  UE Dressing:       []MaxA   []ModA   []Randy   [] CGA   []SBA   []Jacqueline   [x]Independent  LE Dressing:       []MaxA   []ModA   [x]Randy   [] CGA   []SBA   []Jacqueline   []Independent  Grooming:       []MaxA   []ModA   []Randy   [] CGA   []SBA   []Jacqueline   [x]Independent  Toileting:       []MaxA   []ModA   []Randy   [] CGA   []SBA   []Jacqueline   []Independent  Bathing:       []MaxA   []ModA   [x]Randy   [] CGA   []SBA   []Jacqueline   []Independent  Light Meal Prep:    []MaxA   [x]ModA   []Randy   [] CGA   []SBA   []Jacqueline   []Independent  Household/Other: [x]MaxA   []ModA   []Randy   [] CGA   []SBA   []Jacqueline   []Independent  Adaptive Equip:     []MaxA   []ModA   []Randy   [] CGA   []SBA   []Jacqueline   []Independent  Driving:       []MaxA   []ModA   [x]Randy   [] CGA   []SBA   []Jacqueline   []Independent  Money Mgmt:        []MaxA   []ModA   [x]Randy   [] CGA   []SBA   []Jacqueline   []Independent       Pain Level (0-10 scale) post treatment: 1/10    ASSESSMENT/Changes in Function:    [x]  See Plan of Care  []  See progress note/recertification  []  See Discharge Summary PLAN  []  Upgrade activities as tolerated     []  Continue plan of care  []  Update interventions per flow sheet       []  Discharge due to:_  []  Other:_      Iveth Harley OTR/L 5/19/2021  8:07 AM    Future Appointments   Date Time Provider Leanne Martin   5/19/2021  8:15 AM Aaron Rangel OTR/L [id number]   5/26/2021  4:20 PM Brinda Minaya MD [facility] BS AMB

## 2021-05-19 NOTE — PROGRESS NOTES
[facility] [employer] [facility]  [address] [place] [address]  [phone] fax    Plan of Care/Statement of Necessity for Occupational Therapy Services    Patient name: Alecia Barnard Start of Care: 2021   Referral source: Gladys Yost,* : 1955    Medical Diagnosis: Unspecified fracture of lower end of right ulna, subsequent encounter for closed fracture with routine healing [J08.320X]  Payor: Mina Jimenez / Plan: [address] PT / Product Type: Commerical /  Onset Date:3/19/21    Treatment Diagnosis: pain decreased ROM right wrist   Prior Hospitalization: see medical history Provider#: [id number]   Medications: Verified on Patient summary List    Comorbidities:  arthritis , osteoporosis, depression   Prior Level of Function: KD registration, camp, I self care, home care, driving, grandkids, [employer]          The Plan of Care and following information is based on the information from the initial evaluation. Assessment/ key information: pt is a right hand dominant, 63 y.o.y/o, female who sustained his/her injury when tailgate fell on wrist, Had X ray next day and saw MD in area 3/24/21 and was casted until early May. She is now in velcro brace which she wears all the time except when bathing. She has limited right  wrist AROM of 40 degrees in both flexion and extension, with ulnar deviation of 22 and radial of 13. She is not using her right hand for usual self care tasks such as eating and oral care. She reports pain of 1/10. She has difficulty with home care activities, turning key, opening containers and writing. Her FOTO is 37/100 reflecting very severe impairment in function. She will benefit from skilled occupational therapy to imrprove right wrist ROM and strength for return to usual self care and home care tasks.       Evaluation Complexity: History LOW Complexity : Brief history review  Examination LOW Complexity : 1-3 performance deficits relating to physical, cognitive , or psychosocial skils that result in activity limitations and / or participation restrictions  Clinical Decision Making LOW Complexity : No comorbidities that affect functional and no verbal or physical assistance needed to complete eval tasks   Overall Complexity Rating: LOW     Problem List: Pain effecting function, Decreased range of motion, Decreased strength, Edema effecting function, Decreased ADL/functional abilities , Decreased activity tolerance and Decreased flexibility/joint mobility     Treatment Plan may include any combination of the following: Therapeutic exercise, Therapeutic activities, Physical agent/modality, Manual therapy, Splinting/orthoses, Patient education and ADLs/IADLs    Patient / Family readiness to learn indicated by: asking questions, trying to perform skills and interest    Persons(s) to be included in education:   patient (P)    Barriers to Learning/Limitations: None    Patient Goal (s): use right hand again    Patient Self Reported Health Status: good    Rehabilitation Potential: excellent    Short Term Goals: To be accomplished in 2 weeks:  1. Patient will be independent in home exercise program for wrist AROM. 2.  Patient will return to use of right hand for oral care and eating, including cutting meat. 3.  Patient will be able to type at work for brief periods without brace without pain due to improved strength. Long Term Goals: To be accomplished in 4 weeks:   1. Patient will increase right wrist SHI in flexion extension by at least 35 degrees for washing back and fixing hair. 2.  Patient will achieve at least 30# of  in righ thand for return to basic home care tasks. 3.  Patient will achieve at least 10# of pinch in one prehension pattern for opening packages. 4.  Patient will report improved ability to carry items and push up to stand as shown by increase in FOTO of at least 20 points.       Frequency / Duration: Patient to be seen 2-3 times per week for 4 weeks:    Patient/ Caregiver education and instruction: Diagnosis, prognosis, self care, activity modification, brace/ splint application and exercises   [x]  Plan of care has been reviewed with SHEA Kim/L 5/19/2021 9:40 AM    _____________________________________________________________________    I certify that the above Therapy Services are being furnished while the patient is under my care. I agree with the treatment plan and certify that this therapy is necessary.     [de-identified] Signature:____________Date:_________TIME:________     [facility],*  ** Signature, Date and Time must be completed for valid certification **    Please sign and return to [facility] [employer] [facility]   [address] [place] [address]  [phone] fax

## 2021-05-24 ENCOUNTER — HOSPITAL ENCOUNTER (OUTPATIENT)
Dept: PHYSICAL THERAPY | Age: 66
Discharge: HOME | End: 2021-05-24
Attending: ORTHOPAEDIC SURGERY
Payer: COMMERCIAL

## 2021-05-24 PROCEDURE — 97018 PARAFFIN BATH THERAPY: CPT

## 2021-05-24 PROCEDURE — 97110 THERAPEUTIC EXERCISES: CPT

## 2021-05-24 NOTE — PROGRESS NOTES
OT DAILY TREATMENT NOTE 10-18    Patient Name: Colin Booth  Date:2021  : 1955  [x]  Patient  Verified  Payor: Mikal Wilkins / Plan: [facility] [employer] / Product Type: Commerical /    In time:515  Out time:600  Total Treatment Time (min): 45  Visit #: 2 of 12    Treatment Area: Right wrist pain [M25.531]  Unspecified fracture of lower end of right ulna, subsequent encounter for closed fracture with routine healing [S52.824I]    SUBJECTIVE  Pain Level (0-10 scale): 0/10  Any medication changes, allergies to medications, adverse drug reactions, diagnosis change, or new procedure performed?: [x] No    [] Yes (see summary sheet for update)  Subjective functional status/changes:   [] No changes reported  Patient reports doing HEP at this time.     OBJECTIVE  Modality rationale: decrease edema, decrease inflammation, decrease pain and increase tissue extensibility to improve the patients ability to grasp   Min Type Additional Details    [] Estim:  []Unatt       []IFC  []Premod                        []Other:  []w/ice   []w/heat  Position:  Location:    [] Estim: []Att    []TENS instruct  []NMES                    []Other:  []w/US   []w/ice   []w/heat  Position:  Location:    []  Traction: [] Cervical       []Lumbar                       [] Prone          []Supine                       []Intermittent   []Continuous Lbs:  [] before manual  [] after manual    []  Ultrasound: []Continuous   [] Pulsed                           []1MHz   []3MHz W/cm2:  Location:    []  Iontophoresis with dexamethasone         Location: [] Take home patch   [] In clinic   10 []  Ice     []  heat  []  Ice massage  []  Laser   [x]  Paraffin Position:  Location: Right Hand/wrist    []  Laser with stim  []  Other:  Position:  Location:    []  Vasopneumatic Device Pressure:       [] lo [] med [] hi   Temperature: [] lo [] med [] hi     [x] Skin assessment post-treatment:  [x]intact [x]redness- no adverse reaction    []redness  adverse reaction:   35 min Therapeutic Exercise:  [] See flow sheet :   Rationale: increase ROM and increase strength to improve the patients ability to grasp    Right Hand:  Towel Scrunch 10x  Wrist Flexion/Extension, RD/UD- Fist/No Fist  Wrist Mazes: 5x each       With   [] TE   [] TA   [] neuro   [] other: Patient Education: [x] Review HEP    [] Progressed/Changed HEP based on:   [] positioning   [] body mechanics   [] transfers   [] heat/ice application   [] Splint wear/care   [] Sensory re-education   [] scar management      [] other:            Other Objective/Functional Measures:     Independent with HEP    Slow Pace with TE    Pain Level (0-10 scale) post treatment: 0/10    ASSESSMENT/Changes in Function: ROM improving- MD update note for next appointment     Patient will continue to benefit from skilled OT services to modify and progress therapeutic interventions, address ROM deficits, address strength deficits and instruct in home and community integration to attain remaining goals. []  See Plan of Care  []  See progress note/recertification  []  See Discharge Summary         Progress towards goals / Updated goals:  Short Term Goals: To be accomplished in 2 weeks:  1. Patient will be independent in home exercise program for wrist AROM. 5/24: Initiated at [address]     2. Patient will return to use of right hand for oral care and eating, including cutting meat. 5/24: Slowly incorporating into task    3. Patient will be able to type at work for brief periods without brace without pain due to improved strength.     Long Term Goals: To be accomplished in 4 weeks:          1. Patient will increase right wrist SHI in flexion extension by at least 35 degrees for washing back and fixing hair. 2.  Patient will achieve at least 30# of  in righ thand for return to basic home care tasks. 3.  Patient will achieve at least 10# of pinch in one prehension pattern for opening packages.   4.  Patient will report improved ability to carry items and push up to stand as shown by increase in FOTO of at least 20 points.         PLAN  []  Upgrade activities as tolerated     [x]  Continue plan of care  []  Update interventions per flow sheet       []  Discharge due to:_  []  Other:_      SEAMUS Liang 5/24/2021  2:52 PM    Future Appointments   Date Time Provider Leanne Martin   5/24/2021  5:15 PM Allyson Anderson PZCWSTD [facility]   5/25/2021  6:00 PM CHATO Long MMCPTPB [facility]   5/26/2021  4:20 PM Luan Murphy  Samantha ARGUELLES AMB   6/2/2021  5:15 PM Allyson Anderson CYKALXP [facility]   6/4/2021  4:30 PM Allyson Anderson DWUFALD [facility]   6/7/2021  5:15 PM Allyson Anderson ZFMQDZM [facility]   6/9/2021  5:15 PM Allyson Anderson SKIYOUH [facility]   6/15/2021  6:00 PM Maverick Reynolds, OTR/L MMCPTPB [facility]   6/17/2021  6:00 PM Maverick Reynolds, OTR/L MMCPTPB [facility]

## 2021-05-25 ENCOUNTER — HOSPITAL ENCOUNTER (OUTPATIENT)
Dept: PHYSICAL THERAPY | Age: 66
Discharge: HOME | End: 2021-05-25
Attending: ORTHOPAEDIC SURGERY
Payer: COMMERCIAL

## 2021-05-25 PROCEDURE — 97018 PARAFFIN BATH THERAPY: CPT

## 2021-05-25 PROCEDURE — 97530 THERAPEUTIC ACTIVITIES: CPT

## 2021-05-25 PROCEDURE — 97110 THERAPEUTIC EXERCISES: CPT

## 2021-05-25 NOTE — PROGRESS NOTES
OT DAILY TREATMENT NOTE - [facility]     Patient Name: She Oliveira  Date:2021  : 1955  [x]  Patient  Verified  Payor: Viry Baig / Plan: [facility] [employer] / Product Type: Commerical /    In time: 6:02  Out time: 6:47  Total Treatment Time (min): 45    Visit #: 3 of 12    Treatment Area: Pain in right wrist [M25.531]  Unspecified fracture of lower end of right ulna, subsequent encounter for closed fracture with routine healing [S52.890A]    SUBJECTIVE  Pain Level (0-10 scale): 0/10  Any medication changes, allergies to medications, adverse drug reactions, diagnosis change, or new procedure performed?: [x] No    [] Yes (see summary sheet for update)  Subjective functional status/changes:   [] No changes reported    \"I lifted a pan of water yesterday and I was concerned about it bothering me today\"    OBJECTIVE    Modality rationale: decrease edema, decrease inflammation, decrease pain and increase tissue extensibility to improve the patients ability to grasp.     Min Type Additional Details    [] Estim:  []Unatt       []IFC  []Premod                        []Other:  []w/ice   []w/heat  Position:  Location:    [] Estim: []Att    []TENS instruct  []NMES                    []Other:  []w/US   []w/ice   []w/heat  Position:  Location:    []  Traction: [] Cervical       []Lumbar                       [] Prone          []Supine                       []Intermittent   []Continuous Lbs:  [] before manual  [] after manual    []  Ultrasound: []Continuous   [] Pulsed                           []1MHz   []3MHz W/cm2:  Location:    []  Iontophoresis with dexamethasone         Location: [] Take home patch   [] In clinic    []  Ice     []  heat  []  Ice massage  []  Laser   []  Anodyne Position:  Location:   10 minutes []  Laser with stim  [x]  Other: Paraffin  Position: Seated  Location: Right hand/wrist    []  Vasopneumatic Device Pressure:       [] lo [] med [] hi   Temperature: [] lo [] med [] hi   [x] Skin assessment post-treatment:  [x]intact []redness- no adverse reaction    [x]redness  adverse reaction: red rash on forearm. 20 min Therapeutic Exercise:  [] See flow sheet :   Rationale: increase ROM to improve the patients ability to grasp. Right hand/wrist:    Towel scrunches x10   Wrist ROM flex/ext sup/pron x10   Wrist mazes x5 easy. 15 min Therapeutic Activity:  []  See flow sheet :   Rationale: improve coordination and patient education   to improve the patients ability to manipulate small items and to self-manage. Pt provided with joint protection techniques handout. Reviewed ways to self-manage pain, swelling, and discomfort at home through various modalities. Review and demonstration of edema massage on right wrist.     9-hole peg test for recheck. With   [] TE   [] TA   [] neuro   [] other: Patient Education: [x] Review HEP    [x] Progressed/Changed HEP based on: cues for positioning   [] positioning   [] body mechanics   [] transfers   [] heat/ice application   [] Splint wear/care   [] Sensory re-education   [] scar management      [] other:            Other Objective/Functional Measures:     Red rash noted on forearm with paraffin tx. Pain noted with ulnar deviation. Edema  Styloid level 5/19/21 Date Change   Right 15.4cm 15.1cm -.3   Left 14cm       UE ROM  Date     5/19/21 5/25/21 Change       Norms                    Wrist Flex 0-80 40  58 +18     Ext 0-70 40  52 +12     Ulnar Dev 0-30 22  26! +4     Radial Dev 0-20 13  25  +12                 Forearm Supination 0-80 80  75  -5     Pronation 0-80 90  80 - 10                9 Hole   5/19/21 5/25/21 Change     Right 19 15 21%     Left 19             Hand Strength: NT    Pain Level (0-10 scale) post treatment: 0/10    ASSESSMENT/Changes in Function: Wrist ROM improving. Benefits from review of self-care strategies.      Patient will continue to benefit from skilled OT services to address ROM deficits, address strength deficits and analyze and address soft tissue restrictions to attain remaining goals. [x]  See Plan of Care  []  See progress note/recertification  []  See Discharge Summary         Progress towards goals / Updated goals:    Short Term Goals: To be accomplished in 2 weeks:  1.  Patient will be independent in home exercise program for wrist AROM. 5/24: Initiated at [address]   5/25/21: Reviewed wrist AROM HEP. Cues provided for proper positioning.      2.  Patient will return to use of right hand for oral care and eating, including cutting meat.    5/24: Slowly incorporating into task.       3.  Patient will be able to type at work for brief periods without brace without pain due to improved strength.  5/25/21: Able to complete 15 minutes of constant clinic activities with brace off.      Long Term Goals: To be accomplished in 4 weeks:          1.  Patient will increase right wrist SHI in flexion extension by at least 35 degrees for washing back and fixing hair. 2.  Patient will achieve at least 30# of  in righ thand for return to basic home care tasks. 3.  Patient will achieve at least 10# of pinch in one prehension pattern for opening packages.   4.  Patient will report improved ability to carry items and push up to stand as shown by increase in FOTO of at least 20 points.         PLAN  []  Upgrade activities as tolerated     [x]  Continue plan of care  []  Update interventions per flow sheet       []  Discharge due to:_  []  Other:_      SEAMUS Gates 5/25/2021  3:32 PM    Future Appointments   Date Time Provider Leanne Martin   5/25/2021  6:00 PM Vinny  MMCPTPB [facility]   5/26/2021  4:20 PM Bebeto Love  Samantha ARGUELLES AMB   6/2/2021  5:15 PM Phan Martin CBQLGKI [facility]   6/4/2021  4:30 PM Phan Martin [id number] [facility]   6/7/2021  5:15 PM Phan Martin ZVRXEZA [facility]   6/9/2021  6:00 PM Phan Martin EFXDBYP [facility]   6/15/2021  6:00 PM Susan Chopra, OTR/L MMCPTPB [facility] 6/17/2021  6:00 PM Ric Drake OTR/L MMCPTPB [facility]

## 2021-05-26 ENCOUNTER — OFFICE VISIT (OUTPATIENT)
Dept: ORTHOPEDIC SURGERY | Age: 66
End: 2021-05-26
Payer: COMMERCIAL

## 2021-05-26 VITALS
TEMPERATURE: 97 F | WEIGHT: 132 LBS | OXYGEN SATURATION: 97 % | BODY MASS INDEX: 25.91 KG/M2 | HEART RATE: 73 BPM | HEIGHT: 60 IN

## 2021-05-26 DIAGNOSIS — S52.601D CLOSED FRACTURE OF DISTAL END OF RIGHT ULNA WITH ROUTINE HEALING, UNSPECIFIED FRACTURE MORPHOLOGY, SUBSEQUENT ENCOUNTER: Primary | ICD-10-CM

## 2021-05-26 PROCEDURE — 99024 POSTOP FOLLOW-UP VISIT: CPT | Performed by: ORTHOPAEDIC SURGERY

## 2021-05-26 RX ORDER — VILAZODONE HYDROCHLORIDE 20 MG/1
TABLET ORAL
COMMUNITY
Start: 2021-05-12

## 2021-05-26 NOTE — PROGRESS NOTES
Krystian Jackson  1955   Chief Complaint   Patient presents with    Wrist Pain     right, post op        HISTORY OF PRESENT ILLNESS  Krystian Jackson is a 63 y.o. female who presents today for reevaluation of right wrist. Patient rates pain as 1/10 today. Pain has been present since 3/19/2021. Was climbing up the back of a truck and grabbed the tailgate to help her lift herself up. The tailgate went down while her arm was still on the tailgate. Presents today wearing a removable wrist splint. Has tried taking Tramadol, Ibuprofen, and Tylenol. She is doing okay today. Still notes some swelling and occasional twinge of pain. Has been going to OT. Patient denies any fever, chills, chest pain, shortness of breath or calf pain. The remainder of the review of systems is negative. There are no new illness or injuries to report since last seen in the office. There are no changes to medications, allergies, family or social history. Pain Assessment  5/26/2021   Location of Pain Wrist   Pain Location Comment -   Location Modifiers Right   Severity of Pain 1   Quality of Pain Other (Comment)   Quality of Pain Comment twinge   Duration of Pain A few minutes   Frequency of Pain Intermittent   Date Pain First Started -   Aggravating Factors Other (Comment)   Aggravating Factors Comment using hand   Limiting Behavior Some   Relieving Factors Rest   Relieving Factors Comment -   Result of Injury -   Work-Related Injury -       PHYSICAL EXAM:   Visit Vitals  Pulse 73   Temp 97 °F (36.1 °C) (Skin)   Ht 5' (1.524 m)   Wt 132 lb (59.9 kg)   SpO2 97%   BMI 25.78 kg/m²     The patient is a well-developed, well-nourished female   in no acute distress. The patient is alert and oriented times three. The patient is alert and oriented times three. Mood and affect are normal.  LYMPHATIC: lymph nodes are not enlarged and are within normal limits  SKIN: normal in color and non tender to palpation.  There are no bruises or abrasions noted.   NEUROLOGICAL: Motor sensory exam is within normal limits. Reflexes are equal bilaterally. There is normal sensation to pinprick and light touch  MUSCULOSKELETAL:  Examination Right Hand   Skin Intact   Deformity -   Swelling -   Tenderness -   Tenderness A1 Pulley -   Finger flexion Full   Finger extension Full   Thenar Eminence Atrophy -   Sensation Normal   Capillary refill -   Heberden's nodes -   Dupuytren's -      Examination Right Wrist   Skin bruising   Tenderness +distal ulna   Flexion 20   Extension 20   Deformity -   Effusion -   Tinnel's sign -   Phalen's test -   Finklestein maneuver -   Pain with thumb abduction -        IMAGING: XR of right wrist with 3 views obtained in the office dated 5/05/2021 was reviewed and read by Dr. Keller Credit: no change in position of the distal ulna fracture, callus fromation noted        XR of right wrist with 3 views obtained in the office dated 4/14/2021 was reviewed and read by Dr. Keller Credit: minimal change in position at the fracture site, no callus formation        XR of right wrist with 3 views obtained in the office dated 3/30/2021 was reviewed and read by Dr. Keller Credit: Distal ulna fracture with no change in position        XR of right wrist with 3 views obtained in the office dated 3/24/2021 was reviewed and read by Dr. Keller Credit: Nondisplaced fracture of the distal third of the ulna           XR of right forearm with 2 views from Urgent Care in [place] dated 3/20/2021 was reviewed and read by Dr. Keller Credit: Nondisplaced distal third ulna fracture      IMPRESSION:      ICD-10-CM ICD-9-CM    1. Closed fracture of distal end of right ulna with routine healing, unspecified fracture morphology, subsequent encounter  S52.601D V54.12         PLAN:   1. Pt presents today with right wrist pain due to a distal ulna fracture. Advised her to start weaning herself off of using the removable wrist splint over the next couple of weeks. Can continue with OT.  Will see her back in 4 weeks. Risk factors include: n/a  2. No ultrasound exam indicated today  3. No cortisone injection indicated today   4. No Physical/Occupational Therapy indicated today  5. No diagnostic test indicated today:   6. No durable medical equipment indicated today   7. No referral indicated today   8. No medications indicated today:   9. No Narcotic indicated today      RTC 4 weeks      Scribed by Yun Trevino [address]) as dictated by Danny Taveras MD    I, Dr. Danny Taveras, confirm that all documentation is accurate.     Danny Taveras M.D.   [facility]

## 2021-06-03 ENCOUNTER — HOSPITAL ENCOUNTER (OUTPATIENT)
Dept: PHYSICAL THERAPY | Age: 66
Discharge: HOME | End: 2021-06-03
Attending: ORTHOPAEDIC SURGERY
Payer: COMMERCIAL

## 2021-06-03 PROCEDURE — 97530 THERAPEUTIC ACTIVITIES: CPT

## 2021-06-03 PROCEDURE — 97018 PARAFFIN BATH THERAPY: CPT

## 2021-06-03 PROCEDURE — 97110 THERAPEUTIC EXERCISES: CPT

## 2021-06-03 NOTE — PROGRESS NOTES
OT DAILY TREATMENT NOTE     Patient Name: Karishma Benitez  Date:6/3/2021  : 1955  [x]  Patient  Verified  Payor: Nora Webb / [place]: [place] [employer]  [facility] [place] [facility] / Product Type: Commerical /    In time:607  Out time:655  Total Treatment Time (min): 48  Visit #: 4 of 12    Treatment Area: Pain in right wrist [M25.531]  Unspecified fracture of lower end of right ulna, subsequent encounter for closed fracture with routine healing [S52.954E]    SUBJECTIVE  Pain Level (0-10 scale): 0/10  Any medication changes, allergies to medications, adverse drug reactions, diagnosis change, or new procedure performed?: [x] No    [] Yes (see summary sheet for update)  Subjective functional status/changes:   [] No changes reported  Keeping it off more and more.  MD said out of brace in 2 more weeks (mid )  Eat and brush teeth comb hair without brace  Shoulder kind of sore-is that normal    OBJECTIVE    Modality rationale: decrease pain and increase tissue extensibility to improve the patients ability to grasp   Min Type Additional Details    [] Estim:  []Unatt       []IFC  []Premod                        []Other:  []w/ice   []w/heat  Position:  Location:    [] Estim: []Att    []TENS instruct  []NMES                    []Other:  []w/US   []w/ice   []w/heat  Position:  Location:    []  Traction: [] Cervical       []Lumbar                       [] Prone          []Supine                       []Intermittent   []Continuous Lbs:  [] before manual  [] after manual     []  Ultrasound: []Continuous   [] Pulsed                           []1MHz   []3MHz W/cm2:  Location:    []  Iontophoresis with dexamethasone         Location: [] Take home patch   [] In clinic    []  Ice     []  heat  []  Ice massage  []  Laser   [x]  Paraffin 10 mins right hand Position:  Location:    []  Laser with stim  []  Other:  Position:  Location:    []  Vasopneumatic Device  Pre-treatment girth:  Post-treatment girth:  Measured at (location):  Pressure: [] lo [] med [] hi   Temperature: [] lo [] med [] hi       [x] Skin assessment post-treatment:  [x]intact []redness- no adverse reaction    []redness  adverse reaction:       23 min Therapeutic Exercise:  [] See flow sheet :   Rationale: increase ROM and increase strength to improve the patients ability to move right UE  Wrist mazes 5x each right  5 band hand helper x 27 right hand  Reaching task to place sets of 3 1 inch pegs into wall mounted resistive board x 40 to increase shoulder ROM  Graded clothespins 6,8# all pinches right    15 min Therapeutic Activity:  []  See flow sheet :   Rationale: increase ROM and improve coordination  to improve the patients ability to grasp  1/4 in pegs right hand palm to digit x 40        With   [] TE   [] TA   [] neuro   [] other: Patient Education: [x] Review HEP    [] Progressed/Changed HEP based on:   [] positioning   [] body mechanics   [] transfers   [] heat/ice application   [] Splint wear/care   [] Sensory re-education   [] scar management      [] other: try folding clothes, continue to wear when driving           Other Objective/Functional Measures:   Able to perform 5 band hand helper  Shaking noted following wrist extension exercises     Pain Level (0-10 scale) post treatment: 0/10    ASSESSMENT/Changes in Function: improving strength    Patient will continue to benefit from skilled OT services to modify and progress therapeutic interventions, address ROM deficits, address strength deficits, analyze and address soft tissue restrictions, analyze and cue movement patterns and instruct in home and community integration to attain remaining goals. []  See Plan of Care  []  See progress note/recertification  []  See Discharge Summary         Progress towards goals / Updated goals:  .  Patient will be independent in home exercise program for wrist AROM.   5/24: Initiated at [facility]   5/25/21: Reviewed wrist AROM HEP.  Cues provided for proper positioning.      2.  Patient will return to use of right hand for oral care and eating, including cutting meat.    5/24: Slowly incorporating into task.       3.  Patient will be able to type at work for brief periods without brace without pain due to improved strength.  5/25/21: Able to complete 15 minutes of constant clinic activities with brace off.      Long Term Goals: To be accomplished in 4 weeks:          1.  Patient will increase right wrist SHI in flexion extension by at least 35 degrees for washing back and fixing hair. 2.  Patient will achieve at least 30# of  in righ thand for return to basic home care tasks. 6/3/21 able to use 5 band hand helper  3.  Patient will achieve at least 10# of pinch in one prehension pattern for opening packages.   6/3/21 all pinches graded clothespins 8#  4.  Patient will report improved ability to carry items and push up to stand as shown by increase in FOTO of at least 20 points.      PLAN  []  Upgrade activities as tolerated     []  Continue plan of care  []  Update interventions per flow sheet       []  Discharge due to:_  []  Other:_      Dania Carlos, OTR/L 6/3/2021  5:39 PM    Future Appointments   Date Time Provider Leanne Martin   6/3/2021  6:00 PM Shayy Mclaughlin OTR/L MMCPTPB [facility]   6/4/2021  4:30 PM Rika Spears VQLGIIG [facility]   6/7/2021  5:15 PM Rika Spears EGDOBMH [facility]   6/9/2021  6:00 PM Rika Spears QNTUDIZ [facility]   6/15/2021  6:00 PM Shayy Mclaughlin OTR/L MMCPTPB [facility]   6/16/2021  3:50 PM Je Bajwa MD [facility]   6/17/2021  6:00 PM Shayy Mclaughlin OTR/L MMCPTPB [facility]

## 2021-06-04 ENCOUNTER — HOSPITAL ENCOUNTER (OUTPATIENT)
Dept: PHYSICAL THERAPY | Age: 66
Discharge: HOME | End: 2021-06-04
Attending: ORTHOPAEDIC SURGERY
Payer: COMMERCIAL

## 2021-06-04 PROCEDURE — 97110 THERAPEUTIC EXERCISES: CPT

## 2021-06-04 PROCEDURE — 97018 PARAFFIN BATH THERAPY: CPT

## 2021-06-04 PROCEDURE — 97530 THERAPEUTIC ACTIVITIES: CPT

## 2021-06-04 NOTE — PROGRESS NOTES
OT DAILY TREATMENT NOTE 10-18    Patient Name: Williams Cowan  Date:2021  : 1955  [x]  Patient  Verified  Payor: Frankie Kim / Plan: [place] [employer]  [facility] / Product Type: Commerical /    In time:435  Out time:508  Total Treatment Time (min): 33  Visit #: 5 of 12    Treatment Area: Pain in right wrist [M25.531]  Unspecified fracture of lower end of right ulna, subsequent encounter for closed fracture with routine healing [S52.143A]    SUBJECTIVE  Pain Level (0-10 scale): 0/10  Any medication changes, allergies to medications, adverse drug reactions, diagnosis change, or new procedure performed?: [x] No    [] Yes (see summary sheet for update)  Subjective functional status/changes:   [] No changes reported  Patient reports improvement with computer tasks at work     OBJECTIVE           Modality rationale: decrease pain and increase tissue extensibility to improve the patients ability to grasp   Min Type Additional Details      []? Estim:  []? Unatt       []? IFC  []? Premod                        []?Other:  []?w/ice   []?w/heat  Position:  Location:      []? Estim: []? Att    []? TENS instruct  []? NMES                    []?Other:  []?w/US   []?w/ice   []?w/heat  Position:  Location:      []? Traction: []? Cervical       []? Lumbar                       []? Prone          []? Supine                       []?Intermittent   []? Continuous Lbs:  []? before manual  []? after manual      []? Ultrasound: []? Continuous   []? Pulsed                           []? 1MHz   []? 3MHz W/cm2:  Location:      []? Iontophoresis with dexamethasone         Location: []? Take home patch   []? In clinic      []? Ice     []?  heat  []? Ice massage  []? Laser   [x]? Paraffin 10 mins right hand Position:  Location:      []? Laser with stim  []? Other:  Position:  Location:      []?   Vasopneumatic Device  Pre-treatment girth:  Post-treatment girth:  Measured at (location):  Pressure:       []? lo []? med []? hi   Temperature: []? lo []? med []? hi       [x]? Skin assessment post-treatment:  [x]? intact []? redness- no adverse reaction  []? redness  adverse reaction    13 min Therapeutic Exercise:  [] See flow sheet :   Rationale: increase ROM and increase strength to improve the patients ability to      Wrist Mazes: 5x each   Towel Scrunch  Wrist Flexion/Extension no fist- on wedge   UD/RD     10 min Therapeutic Activity:  []  See flow sheet :   Rationale: increase ROM and improve coordination  to improve the patients ability to manipulate small items     Grooved Pegs: Translated palm <> digit for placement/removal - sets of 5, untimed       With   [] TE   [] TA   [] neuro   [] other: Patient Education: [x] Review HEP    [] Progressed/Changed HEP based on:   [] positioning   [] body mechanics   [] transfers   [] heat/ice application   [] Splint wear/care   [] Sensory re-education   [] scar management      [] other:            Other Objective/Functional Measures:     Independent  with Wrist HEP    Difficulty noted with Grooved pegs     Pain Level (0-10 scale) post treatment: 0/10    ASSESSMENT/Changes in Function: ROM improving, some shaking noticed during Wrist ROM    Patient will continue to benefit from skilled OT services to modify and progress therapeutic interventions, address ROM deficits, address strength deficits and instruct in home and community integration to attain remaining goals. []  See Plan of Care  []  See progress note/recertification  []  See Discharge Summary         Progress towards goals / Updated goals:   1. Patient will be independent in home exercise program for wrist AROM.    Status at Eval/PN:  Initiated at [facility]  6/4: Goal Met     2.  Patient will return to use of right hand for oral care and eating, including cutting meat.    Status at Eval/PN: Using Left  6/4: Goal Met per patient report       3.  Patient will be able to type at work for brief periods without brace without pain due to improved strength. Status at Eval/PN: Unable  6/4: Goal Met per patient report      Long Term Goals: To be accomplished in 4 weeks:          1.  Patient will increase right wrist SHI in flexion extension by at least 35 degrees for washing back and fixing hair. 2.  Patient will achieve at least 30# of  in righ thand for return to basic home care tasks. 6/3/21 able to use 5 band hand helper    3.  Patient will achieve at least 10# of pinch in one prehension pattern for opening packages.   6/3/21 all pinches graded clothespins 8#    4.  Patient will report improved ability to carry items and push up to stand as shown by increase in FOTO of at least 20 points.      PLAN  []  Upgrade activities as tolerated     [x]  Continue plan of care  []  Update interventions per flow sheet       []  Discharge due to:_  []  Other:_      Francisca VillanuevaRIVERA/L 6/4/2021  2:42 PM    Future Appointments   Date Time Provider Leanne Martin   6/4/2021  4:30 PM Alyssa Elizabeth TFHASHC [facility]   6/7/2021  5:15 PM Alyssa Elizabeth TBAFLUW [facility]   6/9/2021  6:00 PM Alyssa Elizabeth WBOGCYB [facility]   6/15/2021  6:00 PM Meryvonne Riojas, OTR/L MMCPTPB [facility]   6/16/2021  3:50 PM Cinthia Manzo MD [facility]   6/17/2021  6:00 PM Meryvonne Riojas, OTR/L MMCPTPB [facility]

## 2021-06-07 ENCOUNTER — HOSPITAL ENCOUNTER (OUTPATIENT)
Dept: PHYSICAL THERAPY | Age: 66
Discharge: HOME | End: 2021-06-07
Attending: ORTHOPAEDIC SURGERY
Payer: COMMERCIAL

## 2021-06-07 PROCEDURE — 97018 PARAFFIN BATH THERAPY: CPT

## 2021-06-07 PROCEDURE — 97110 THERAPEUTIC EXERCISES: CPT

## 2021-06-07 NOTE — PROGRESS NOTES
OT DAILY TREATMENT NOTE 10-18    Patient Name: Colin Booth  Date:2021  : 1955  [x]  Patient  Verified  Payor: Mikal Wilkins / Plan: [place] [employer]  CAPITATED PT / Product Type: Commerical /    In time:515  Out time:550  Total Treatment Time (min): 35  Visit #: 6 of 12    Treatment Area: Pain in right wrist [M25.531]  Unspecified fracture of lower end of right ulna, subsequent encounter for closed fracture with routine healing [S52.755Y]    SUBJECTIVE  Pain Level (0-10 scale): 0/10  Any medication changes, allergies to medications, adverse drug reactions, diagnosis change, or new procedure performed?: [x] No    [] Yes (see summary sheet for update)  Subjective functional status/changes:   [] No changes reported  Patient reporting returning to using Right hand for more tasks     OBJECTIVE      Modality rationale: decrease pain and increase tissue extensibility to improve the patients ability to grasp   Min Type Additional Details       []? ? Estim:  []?? Unatt       []? ?IFC  []? ?Premod                        []? ? Other:  []??w/ice   []? ?w/heat  Position:  Location:       []? ? Estim: []??Att    []? ?TENS instruct  []? ?NMES                    []? ? Other:  []??w/US   []? ?w/ice   []? ?w/heat  Position:  Location:       []? ?  Traction: []?? Cervical       []? ?Lumbar                       []? ? Prone          []? ?Supine                       []? ?Intermittent   []? ? Continuous Lbs:  []?? before manual  []? ? after manual       []? ?  Ultrasound: []??Continuous   []? ? Pulsed                           []? ?1MHz   []? ? 3MHz W/cm2:  Location:       []? ?  Iontophoresis with dexamethasone         Location: []?? Take home patch   []? ? In clinic       []? ?  Ice     []? ?  heat  []? ?  Ice massage  []? ?  Laser   [x]? ?  Paraffin 10 mins right hand Position:  Location:       []? ?  Laser with stim  []? ?  Other:  Position:  Location:       []? ?  Vasopneumatic Device  Pre-treatment girth:  Post-treatment girth:  Measured at (location): Pressure:       []? ? lo []? ? med []?? hi   Temperature: []?? lo []? ? med []?? hi        [x]? ? Skin assessment post-treatment:  [x]? ? intact []? ?redness- no adverse reaction  []? ?redness  adverse reaction    25 min Therapeutic Exercise:  [] See flow sheet :   Rationale: increase ROM and increase strength to improve the patients ability to     Towel Scrunches   Wrist Flexion/Extension on wedge- no fist   Wrist Mazes   8# Graded Clothes Pin- 3 Point/Lateral Pinch 10x   6# Graded Clothes Pin- Tip Pinch 10x         With   [] TE   [] TA   [] neuro   [] other: Patient Education: [x] Review HEP    [] Progressed/Changed HEP based on:   [] positioning   [] body mechanics   [] transfers   [] heat/ice application   [] Splint wear/care   [] Sensory re-education   [] scar management      [] other:            Other Objective/Functional Measures:     Mild shaking with wrist flexion/extension on wedge      Pain Level (0-10 scale) post treatment: 0/10    ASSESSMENT/Changes in Function: ROM/Strength improving     Patient will continue to benefit from skilled OT services to modify and progress therapeutic interventions, address ROM deficits, address strength deficits and instruct in home and community integration to attain remaining goals. []  See Plan of Care  []  See progress note/recertification  []  See Discharge Summary         Progress towards goals / Updated goals:   1. Patient will be independent in home exercise program for wrist AROM.    Status at Eval/PN:  Initiated at [address] [place] [address]  6/4: Goal Met     2.  Patient will return to use of right hand for oral care and eating, including cutting meat.    Status at Eval/PN: Using Left  6/4: Goal Met per patient report       3.  Patient will be able to type at work for brief periods without brace without pain due to improved strength.   Status at Eval/PN: Unable  6/4: Goal Met per patient report      Long Term Goals: To be accomplished in 4 weeks:          1.  Patient will increase right wrist SHI in flexion extension by at least 35 degrees for washing back and fixing hair. 6/7: Progressing with in clinic tasks/HEP     2.  Patient will achieve at least 30# of  in righ thand for return to basic home care tasks. 6/3/21 able to use 5 band hand helper     3.  Patient will achieve at least 10# of pinch in one prehension pattern for opening packages.   6/7: 8# clothes pins for lateral/3 point, 6# for Tip     4.  Patient will report improved ability to carry items and push up to stand as shown by increase in FOTO of at least 20 points.      PLAN  []  Upgrade activities as tolerated     [x]  Continue plan of care  []  Update interventions per flow sheet       []  Discharge due to:_  []  Other:_      SEAMUS Vega 6/7/2021  11:33 AM    Future Appointments   Date Time Provider Leanne Martin   6/7/2021  5:15 PM William Sears PONWILLIAMVT [facility]   6/9/2021  6:00 PM William SONICW [facility]   6/15/2021  6:00 PM Lynette Linn OTR/L MMCPTPB [facility]   6/16/2021  3:50 PM Harjeet Aguilar MD [facility]   6/17/2021  6:00 PM Lynette Linn OTR/L MMCPTPB [facility]

## 2021-06-09 ENCOUNTER — HOSPITAL ENCOUNTER (OUTPATIENT)
Dept: PHYSICAL THERAPY | Age: 66
Discharge: HOME | End: 2021-06-09
Attending: ORTHOPAEDIC SURGERY
Payer: COMMERCIAL

## 2021-06-09 PROCEDURE — 97018 PARAFFIN BATH THERAPY: CPT

## 2021-06-09 PROCEDURE — 97110 THERAPEUTIC EXERCISES: CPT

## 2021-06-09 NOTE — PROGRESS NOTES
OT DAILY TREATMENT NOTE 10-18    Patient Name: Mariposa Miller  Date:2021  : 1955  [x]  Patient  Verified  Payor: Jeb Izaguirre / Plan: [place] [employer]  CAPITATED PT / Product Type: Commerical /    In time:550  Out time:630  Total Treatment Time (min): 40  Visit #: 7 of 12    Treatment Area: Pain in right wrist [M25.531]  Unspecified fracture of lower end of right ulna, subsequent encounter for closed fracture with routine healing [S52.350J]    SUBJECTIVE  Pain Level (0-10 scale): 0/10  Any medication changes, allergies to medications, adverse drug reactions, diagnosis change, or new procedure performed?: [x] No    [] Yes (see summary sheet for update)  Subjective functional status/changes:   [] No changes reported  I am going back to see the doctor     OBJECTIVE    Modality rationale: decrease pain and increase tissue extensibility to improve the patients ability to functionally use hand   Min Type Additional Details    [] Estim:  []Unatt       []IFC  []Premod                        []Other:  []w/ice   []w/heat  Position:  Location:    [] Estim: []Att    []TENS instruct  []NMES                    []Other:  []w/US   []w/ice   []w/heat  Position:  Location:    []  Traction: [] Cervical       []Lumbar                       [] Prone          []Supine                       []Intermittent   []Continuous Lbs:  [] before manual  [] after manual    []  Ultrasound: []Continuous   [] Pulsed                           []1MHz   []3MHz W/cm2:  Location:    []  Iontophoresis with dexamethasone         Location: [] Take home patch   [] In clinic   10 []  Ice     []  heat  []  Ice massage  []  Laser   [x]  Paraffin Position:  Location:    []  Laser with stim  []  Other:  Position:  Location:    []  Vasopneumatic Device Pressure:       [] lo [] med [] hi   Temperature: [] lo [] med [] hi     [x] Skin assessment post-treatment:  [x]intact []redness- no adverse reaction    []redness  adverse reaction:     30 min Therapeutic Exercise:  [] See flow sheet :   Rationale: increase ROM and increase strength to improve the patients ability to , grasp     Recheck: , Wrist Flexion/Extension   On wedge- 1# wrist Flexion/Extension  AROM- Wrist Flexion/Extension   RD/UD- 1# 10x  Wrist Mazes: 5x each   Medium Theraputty: Manipulated with Right hand only to reveal/remove 1/4 in pegs  10/10    With   [] TE   [] TA   [] neuro   [] other: Patient Education: [x] Review HEP    [] Progressed/Changed HEP based on:   [] positioning   [] body mechanics   [] transfers   [] heat/ice application   [] Splint wear/care   [] Sensory re-education   [] scar management      [] other:            Other Objective/Functional Measures:      Measurements: Taken with Mani Dynamometer, in Lbs   Level 2 5/19  Eval 6/9   Right NT 25   Left 40         Wrist:   Ext:   60 (40)   Flex: 50 (40)   SHI: 110 (80)        Pain Level (0-10 scale) post treatment: 0/10    ASSESSMENT/Changes in Function: Able to perform  test with no reports of pain, improved wrist flexion/extension    Patient will continue to benefit from skilled OT services to modify and progress therapeutic interventions, address ROM deficits, address strength deficits and instruct in home and community integration to attain remaining goals. []  See Plan of Care  []  See progress note/recertification  []  See Discharge Summary         Progress towards goals / Updated goals:   1. Patient will be independent in home exercise program for wrist AROM.    Status at Eval/PN:  Initiated at Eval   6/4: Goal Met     2.  Patient will return to use of right hand for oral care and eating, including cutting meat.    Status at Eval/PN: Using Left  6/4: Goal Met per patient report       3.  Patient will be able to type at work for brief periods without brace without pain due to improved strength.   Status at Eval/PN: Unable  6/4: Goal Met per patient report      Long Term Goals: To be accomplished in 4 weeks:          1.  Patient will increase right wrist SHI in flexion extension by at least 35 degrees for washing back and fixing hair. Status at Eval: 80 degrees  6/9: 110 (+30)     2.  Patient will achieve at least 30# of  in righ thand for return to basic home care tasks. Status at Eval: Not tested due to protocol  6/9: 25#      3.  Patient will achieve at least 10# of pinch in one prehension pattern for opening packages.   6/7: 8# clothes pins for lateral/3 point, 6# for Tip      4.  Patient will report improved ability to carry items and push up to stand as shown by increase in FOTO of at least 20 points.      PLAN  []  Upgrade activities as tolerated     [x]  Continue plan of care   []  Update interventions per flow sheet       []  Discharge due to:_  []  Other:_      NICOLE Frost/NAYE 6/9/2021  1:46 PM    Future Appointments   Date Time Provider Leanne Martin   6/9/2021  6:00 PM Rika Spears [id number] [facility]   6/15/2021  6:00 PM Shayy Mclaughlin, OTR/L MMCPTPB [facility]   6/16/2021  3:50 PM Je Bajwa MD [facility] AMB   6/17/2021  6:00 PM Shayy Mclaughlin OTR/L MMCPTPB [facility]

## 2021-06-15 ENCOUNTER — APPOINTMENT (OUTPATIENT)
Dept: PHYSICAL THERAPY | Age: 66
End: 2021-06-15
Attending: ORTHOPAEDIC SURGERY
Payer: COMMERCIAL

## 2021-06-17 ENCOUNTER — HOSPITAL ENCOUNTER (OUTPATIENT)
Dept: PHYSICAL THERAPY | Age: 66
Discharge: HOME | End: 2021-06-17
Attending: ORTHOPAEDIC SURGERY
Payer: COMMERCIAL

## 2021-06-17 PROCEDURE — 97110 THERAPEUTIC EXERCISES: CPT

## 2021-06-17 NOTE — PROGRESS NOTES
OT DAILY TREATMENT NOTE     Patient Name: Williams Cowan  Date:2021  : 1955  [x]  Patient  Verified  Payor: Frankie Kim / [place]: [place] [employer]  CAPITATED PT / Product Type: Commerical /    In time:600  Out time:650  Total Treatment Time (min): 50  Visit #: 8 of 12    Medicare/BCBS Only   Total Timed Codes (min):  50 1:1 Treatment Time:  50     Treatment Area: Pain in right wrist [M25.531]  Unspecified fracture of lower end of right ulna, subsequent encounter for closed fracture with routine healing [S52.605B]    SUBJECTIVE  Pain Level (0-10 scale): 0/10  Any medication changes, allergies to medications, adverse drug reactions, diagnosis change, or new procedure performed?: [] No    [x] Yes (see summary sheet for update)  Subjective functional status/changes:   [] No changes reported  Have a rash, on prednisone    OBJECTIVE      50 min Therapeutic Exercise:  [] See flow sheet :   Rationale: increase ROM and increase strength to improve the patients ability to grasp  Recheck  pinch ROM  Med putty HEP  Wrist ex HEP    With   [] TE   [] TA   [] neuro   [] other: Patient Education: [x] Review HEP    [] Progressed/Changed HEP based on: HEP  [] positioning   [] body mechanics   [] transfers   [] heat/ice application   [] Splint wear/care   [] Sensory re-education   [] scar management      [] other:            Other Objective/Functional Measures:   Current measures:  Right wrist   Flex 70  Ext 70      Hand Strength: Gross Grasp 3pt Pinch Lateral Pinch Tip Pinch   Right  35 10 10 8   Left 40 10 10 6       Pain Level (0-10 scale) post treatment: 0/10    ASSESSMENT/Changes in Function: Improved ROm, strength, function    []  See Plan of Care  []  See progress note/recertification  [x]  See Discharge Summary         Progress towards goals / Updated goals:  1. Patient will be independent in home exercise program for wrist AROM.    Status at Eval/PN:  Initiated at Eval   Discharge status: Goal Met     2.  Patient will return to use of right hand for oral care and eating, including cutting meat.    Status at Eval/PN: Using Left  Discharge status[de-identified] Goal Met per patient report       3.  Patient will be able to type at work for brief periods without brace without pain due to improved strength. Status at Eval/PN: Unable  Discharge status: Goal Met per patient report      Long Term Goals: To be accomplished in 4 weeks:          1.  Patient will increase right wrist SHI in flexion extension by at least 35 degrees for washing back and fixing hair. Status at Eval: 80 degrees  Discharge status:  goal met     2.  Patient will achieve at least 30# of  in righ thand for return to basic home care tasks. Status at Eval: Not tested due to protocol  Discharge status: goal met now 35#     3.  Patient will achieve at least 10# of pinch in one prehension pattern for opening packages. Eval status;  Not tested  Discharge status: Goal met     4.  Patient will report improved ability to carry items and push up to stand as shown by increase in FOTO of at least 20 points  Eval status; FOTO 57  Discharge status; FOTO 79 Goal met    PLAN  []  Upgrade activities as tolerated     []  Continue plan of care  []  Update interventions per flow sheet       [x]  Discharge due to:goals met_  []  Other:_      SHEA Ramos/L 6/17/2021  6:06 PM    Future Appointments   Date Time Provider Leanne Martin   6/30/2021  4:10 PM Jacinto Mejia MD [facility]

## 2021-06-23 NOTE — PROGRESS NOTES
[facility]  [address] [facility]  [phone] fax    Occupational Therapy Discharge Summary    Patient name: Effie Carney Start of Care: 21   Referral source: Werner Orta,* : 1955   Medical/Treatment Diagnosis: Pain in right wrist [M25.531]  Unspecified fracture of lower end of right ulna, subsequent encounter for closed fracture with routine healing [S53.389I]  Payor: Enoc Thurman / Plan: [address] PT / Product Type: Commerical /  Onset Date:3/19/21     Prior Hospitalization: see medical history Provider#: [id number]   Medications: Verified on Patient Summary List    Comorbidities: arthritis , osteoporosis, depression  Prior Level of Function:[employer] registration, camp, I self care, home care, driving, grandkids, Buddhist    Visits from Start of Care: 8  Missed Visits: 1  Reporting Period : 21 to 21    Summary of Care:Patient was seen for modalities, therapeutic exercises and activities to improve hand function. S/he has HEP. Current measures:  Right wrist   Flex     70 (was 40)  Ext       70 (was 40)        Hand Strength: Gross Grasp 3pt Pinch Lateral Pinch Tip Pinch   Right  35 (NT) 10 (NT) 10 (NT) 8 (NT)   Left 40 10 10 6      FOTO 79 (was 57)    1. Patient will be independent in home exercise program for wrist AROM.    Status at Eval/PN:  Initiated at Eval   Discharge status: Goal Met     2.  Patient will return to use of right hand for oral care and eating, including cutting meat.    Status at Eval/PN: Using Left  Discharge status[de-identified] Goal Met per patient report       3.  Patient will be able to type at work for brief periods without brace without pain due to improved strength.   Status at Eval/PN: Unable  Discharge status: Goal Met per patient report      Long Term Goals: To be accomplished in 4 weeks:          1.  Patient will increase right wrist SHI in flexion extension by at least 35 degrees for washing back and fixing hair. Status at Eval: 80 degrees  Discharge status:  goal met     2.  Patient will achieve at least 30# of  in righ thand for return to basic home care tasks. Status at Eval: Not tested due to protocol  Discharge status: goal met now 35#     3.  Patient will achieve at least 10# of pinch in one prehension pattern for opening packages. Eval status;  Not tested  Discharge status: Goal met      4.  Patient will report improved ability to carry items and push up to stand as shown by increase in FOTO of at least 20 points  Eval status; FOTO 57  Discharge status; FOTO 79 Goal met    ASSESSMENT/Changes in Function: Improved ROM, strength funciton    ASSESSMENT/RECOMMENDATIONS:  [x]Discontinue therapy: [x]Patient has reached or is progressing toward set goals      []Patient is non-compliant or has abdicated      []Due to lack of appreciable progress towards set goals    MERRY Lanza 6/23/2021 1:03 PM

## 2021-07-14 ENCOUNTER — OFFICE VISIT (OUTPATIENT)
Dept: ORTHOPEDIC SURGERY | Age: 66
End: 2021-07-14
Payer: COMMERCIAL

## 2021-07-14 VITALS — BODY MASS INDEX: 25.78 KG/M2 | OXYGEN SATURATION: 97 % | RESPIRATION RATE: 15 BRPM | HEIGHT: 60 IN | HEART RATE: 86 BPM

## 2021-07-14 DIAGNOSIS — S52.601D CLOSED FRACTURE OF DISTAL END OF RIGHT ULNA WITH ROUTINE HEALING, UNSPECIFIED FRACTURE MORPHOLOGY, SUBSEQUENT ENCOUNTER: Primary | ICD-10-CM

## 2021-07-14 PROCEDURE — 99212 OFFICE O/P EST SF 10 MIN: CPT | Performed by: ORTHOPAEDIC SURGERY

## 2021-07-14 NOTE — PROGRESS NOTES
Kushal Santiago  1955   Chief Complaint   Patient presents with    Wrist Pain     right wrist        HISTORY OF PRESENT ILLNESS  Kushal Santiago is a 63 y.o. female who presents today for reevaluation of right wrist. Patient rates pain as 0/10 today. Pain has been present since 3/19/2021. Was climbing up the back of a truck and grabbed the tailgate to help her lift herself up. The tailgate went down while her arm was still on the tailgate. Has tried taking Tramadol, Ibuprofen, and Tylenol. Erin Raya Has been to 8 sessions of OT. Is doing much better. Has been doing more with the arm. Patient denies any fever, chills, chest pain, shortness of breath or calf pain. The remainder of the review of systems is negative. There are no new illness or injuries to report since last seen in the office. There are no changes to medications, allergies, family or social history. PHYSICAL EXM:   Visit Vitals  Pulse 86   Resp 15   Ht 5' (1.524 m)   SpO2 97%   BMI 25.78 kg/m²     The patient is a well-developed, well-nourished female   in no acute distress. The patient is alert and oriented times three. The patient is alert and oriented times three. Mood and affect are normal.  LYMPHATIC: lymph nodes are not enlarged and are within normal limits  SKIN: normal in color and non tender to palpation. There are no bruises or abrasions noted. NEUROLOGICAL: Motor sensory exam is within normal limits. Reflexes are equal bilaterally.  There is normal sensation to pinprick and light touch  MUSCULOSKELETAL:  Examination Right Hand   Skin Intact   Deformity -   Swelling -   Tenderness -   Tenderness A1 Pulley -   Finger flexion Full   Finger extension Full   Thenar Eminence Atrophy -   Sensation Normal   Capillary refill -   Heberden's nodes -   Dupuytren's -      Examination Right Wrist   Skin -   Tenderness    Flexion 20   Extension 20   Deformity -   Effusion -   Tinnel's sign -   Phalen's test -   Finklestein maneuver -   Pain with thumb abduction -        IMAGING: XR of right wrist with 3 views obtained in the office dated 5/05/2021 was reviewed and read by Dr. Arjun Lynn: no change in position of the distal ulna fracture, callus fromation noted        XR of right wrist with 3 views obtained in the office dated 4/14/2021 was reviewed and read by Dr. Arjun Lynn: minimal change in position at the fracture site, no callus formation        XR of right wrist with 3 views obtained in the office dated 3/30/2021 was reviewed and read by Dr. Arjun Lynn: Distal ulna fracture with no change in position        XR of right wrist with 3 views obtained in the office dated 3/24/2021 was reviewed and read by Dr. Arjun Lynn: Nondisplaced fracture of the distal third of the ulna           XR of right forearm with 2 views from Urgent Care in [place] dated 3/20/2021 was reviewed and read by Dr. Arjun Lynn: Nondisplaced distal third ulna fracture      IMPRESSION:      ICD-10-CM ICD-9-CM    1. Closed fracture of distal end of right ulna with routine healing, unspecified fracture morphology, subsequent encounter  S52.601D V54.12         PLAN:   1. Pt presents today with right wrist pain due to a distal ulna fracture. She has been to OT and notes overall relief. Pt is doing well and can return as needed. Risk factors include: n/a  2. No ultrasound exam indicated today  3. No cortisone injection indicated today   4. No Physical/Occupational Therapy indicated today  5. No diagnostic test indicated today:   6. No durable medical equipment indicated today   7. No referral indicated today   8. No medications indicated today:   9. No Narcotic indicated today      RTC prn    Scribed by [place] [id number] [facility] [address]) as dictated by Chidi Natarajan MD    I, Dr. Chidi Natarajan, confirm that all documentation is accurate.     Chidi Natarajan M.D.   [facility]

## 2022-01-06 ENCOUNTER — TRANSCRIBE ORDER (OUTPATIENT)
Dept: SCHEDULING | Age: 67
End: 2022-01-06

## 2022-01-06 DIAGNOSIS — Z12.31 VISIT FOR SCREENING MAMMOGRAM: Primary | ICD-10-CM

## 2022-01-18 ENCOUNTER — HOSPITAL ENCOUNTER (OUTPATIENT)
Dept: MAMMOGRAPHY | Age: 67
Discharge: HOME | End: 2022-01-18
Attending: FAMILY MEDICINE
Payer: COMMERCIAL

## 2022-01-18 DIAGNOSIS — Z12.31 VISIT FOR SCREENING MAMMOGRAM: ICD-10-CM

## 2022-01-18 PROCEDURE — 77063 BREAST TOMOSYNTHESIS BI: CPT

## 2022-05-19 ENCOUNTER — APPOINTMENT (OUTPATIENT)
Dept: PHYSICAL THERAPY | Age: 67
End: 2022-05-19

## 2022-06-07 ENCOUNTER — HOSPITAL ENCOUNTER (OUTPATIENT)
Dept: PHYSICAL THERAPY | Age: 67
Discharge: HOME | End: 2022-06-07
Payer: COMMERCIAL

## 2022-06-07 PROCEDURE — 97161 PT EVAL LOW COMPLEX 20 MIN: CPT

## 2022-06-07 NOTE — PROGRESS NOTES
PT DAILY TREATMENT NOTE - [facility]     Patient Name: Trung Gonsalves  Date:2022  : 1955  [x]  Patient  Verified  Payor: Nan Hendricks / Plan: [address] [place] [address] [place] HMO / Product Type: HMO /    In time:300  Out time:342  Total Treatment Time (min): 42  Visit #: 1     Treatment Area: Sacral region somatic dysfunction [M99.04]    SUBJECTIVE  Pain Level (0-10 scale): 2/10  Any medication changes, allergies to medications, adverse drug reactions, diagnosis change, or new procedure performed?: [x] No    [] Yes (see summary sheet for update)  Subjective functional status/changes:   [] No changes reported  See eval    OBJECTIVE    15 min []Eval                  []Re-Eval       25 min Therapeutic Exercise:  [] See flow sheet :   Rationale: increase ROM and increase strength to improve the patients ability to eae with adk's           With   [] TE   [] TA   [] neuro   [] other: Patient Education: [x] Review HEP    [] Progressed/Changed HEP based on:   [] positioning   [] body mechanics   [] transfers   [] heat/ice application    [] other:      Other Objective/Functional Measures: see eval     Pain Level (0-10 scale) post treatment: 210    ASSESSMENT/Changes in Function: see poc    Patient will continue to benefit from skilled PT services to modify and progress therapeutic interventions, address functional mobility deficits, address ROM deficits, address strength deficits, analyze and address soft tissue restrictions, analyze and cue movement patterns, analyze and modify body mechanics/ergonomics, assess and modify postural abnormalities and address imbalance/dizziness to attain remaining goals.      [x]  See Plan of Care  []  See progress note/recertification  []  See Discharge Summary         Progress towards goals / Updated goals:  See poc    PLAN  [x]  Upgrade activities as tolerated     [x]  Continue plan of care  []  Update interventions per flow sheet       []  Discharge due to:_  []  Other:_      Blanca Morse PT 6/7/2022  11:37 AM    Future Appointments   Date Time Provider Leanne Martin   6/7/2022  3:00 PM Sharon Barkley, PT [facility]

## 2022-06-07 NOTE — PROGRESS NOTES
[facility]  [address] [facility]  [phone] fax    Plan of Care/ Statement of Necessity for Physical Therapy Services    Patient name: Jm Murillo Start of Care: 2022   Referral source: Antonio Cortes  : 1955    Medical Diagnosis: Sacral region somatic dysfunction [M99.04]  Payor: OPTIMA / [place] [employer] [place] / Product Type: HMO /  Onset Date:2022    Treatment Diagnosis: LBP   Prior Hospitalization: see medical history Provider#: [id number]   Medications: Verified on Patient summary List    Comorbidities: Arthritis   Prior Level of Function:  independent with ADLs, functional, and work activities with no limitations. The Plan of Care and following information is based on the information from the initial evaluation. Assessment/ key information: Pt is a 61 yr old female with reports of left LS and mid sacral Pain. Pt denies any LS trauma or injury. Pt reports pain at 2/20 today and is aggravated with prolonged standing, walking and sitting. Pt denies any paresthesias into her extremities. 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 Pt ambulates with a noticeable leg length discrepancy with left hip drop. SLR Test= Negative. Scour Test =Negative. Pt reports her bilateral Hamstring feels tight and achy intermittently from  previous years   She denies any numbness in the LEs. Pt demonstrated decreased AROM in  Sidebend only. Decreased strength including core and glute strentgth, muscle tightness including Hip flexors, QL and Psoas. As well as impaired pelvic alignment.    Pt would benefit from physical therapy to improve the above impairments to help the pt return to performing ADLs, functional and work activities.      Evaluation Complexity History LOW Complexity : Zero comorbidities / personal factors that will impact the outcome / POC; Examination LOW Complexity : 1-2 Standardized tests and measures addressing body structure, function, activity limitation and / or participation in recreation  ;Presentation MEDIUM Complexity : Evolving with changing characteristics  ; Clinical Decision Making MEDIUM Complexity : FOTO score of 26-74  Overall Complexity Rating: LOW   Problem List: pain affecting function, decrease ROM, decrease strength, impaired gait/ balance, decrease ADL/ functional abilitiies, decrease activity tolerance, decrease flexibility/ joint mobility and decrease transfer abilities   Treatment Plan may include any combination of the following: Therapeutic exercise, Therapeutic activities, Neuromuscular re-education, Physical agent/modality, Gait/balance training, Manual therapy, Patient education, Self Care training, Functional mobility training and Home safety training  Patient / Family readiness to learn indicated by: asking questions, trying to perform skills and interest  Persons(s) to be included in education: patient (P)  Barriers to Learning/Limitations: None  Patient Goal (s): Pain to go away and better sleep  Patient Self Reported Health Status: good  Rehabilitation Potential: good    Short Term Goals: To be accomplished in 1 weeks:   1. Pt will be compliant with a HEP to ease with ADL's  Long Term Goals: To be accomplished in 6 weeks:   1. Pt will improve FOTO score by 7  pts to improve function. 2. Pt will report >60% improvement to ease with ADL's.     3. . Pt will report having minimal to no increased LBP or B LE pain with prolonged sitting at work and with sit to stands to improve ability to perform work activities. Frequency / Duration: Patient to be seen 2-3 times per week for 6 weeks.     Patient/ Caregiver education and instruction: Diagnosis, prognosis, activity modification and exercises   [x]  Plan of care has been reviewed with HAYLEE Flores, PT 6/7/2022 3:04 PM    ________________________________________________________________________    I certify that the above Therapy Services are being furnished while the patient is under my care. I agree with the treatment plan and certify that this therapy is necessary.     [de-identified] Signature:____________Date:_________TIME:________     Salma Osorio, DO  ** Signature, Date and Time must be completed for valid certification **    Please sign and return to [facility]  [address] [place] [address]  [phone] fax

## 2022-06-14 ENCOUNTER — HOSPITAL ENCOUNTER (OUTPATIENT)
Dept: PHYSICAL THERAPY | Age: 67
Discharge: HOME | End: 2022-06-14
Payer: COMMERCIAL

## 2022-06-14 PROCEDURE — 97140 MANUAL THERAPY 1/> REGIONS: CPT

## 2022-06-14 PROCEDURE — 97110 THERAPEUTIC EXERCISES: CPT

## 2022-06-14 NOTE — PROGRESS NOTES
PT DAILY TREATMENT NOTE     Patient Name: Leroy Douglass  Date:2022  : 1955  [x]  Patient  Verified  Payor: Shala Tripp / Plan: [address] HMO / Product Type: HMO /    In time: 3:08  Out time: 4:00  Total Treatment Time (min): 42  Visit #: 2 of 18    Treatment Area: Other low back pain [M54.59]    SUBJECTIVE  Pain Level (0-10 scale): 1/10  Any medication changes, allergies to medications, adverse drug reactions, diagnosis change, or new procedure performed?: [x] No    [] Yes (see summary sheet for update)  Subjective functional status/changes:   [] No changes reported  Pt reports pain along her back hips but not as bad today. She didn't realize she was supposed to move the heel lift from shoe to shoe so hasn't been wearing it except in her tennis shoes. She states sleeping on her side with her  leg resting on top and intertwined may be affecting her pain as she had less when they had Covid and weren't sleeping in the same bed. She tends to sit on one leg when sitting on the couch (left under her). OBJECTIVE    32 min Therapeutic Exercise:  [x] See flow sheet :   Rationale: increase ROM, increase strength, improve coordination, improve balance and increase proprioception to improve the patients ability to ambulate and sleep with less pain    10 min Manual Therapy:  Leg lengthening for right upslip; MET for left PI/right AI; shotgun technique; MET for left/left sacral torsion   The manual therapy interventions were performed at a separate and distinct time from the therapeutic activities interventions.   Rationale: decrease pain, increase ROM and increase tissue extensibility to ambulate and sleep with decreased pain          With   [] TE   [] TA   [] neuro   [] other: Patient Education: [x] Review HEP    [] Progressed/Changed HEP based on:   [] positioning   [] body mechanics   [] transfers   [] heat/ice application    [] other:      Other Objective/Functional Measures:   Reviewed importance of wearing heel lift in left shoe at all times to assess effectiveness of maintaining alignment  Reviewed side sleeping posture with pillow vertical between knee/ankle  Educated on sitting neutral with B LEs on the floor versus sitting on a leg  Reviewed light weight purse and carrying groceries even in both arms to reduce contralateral pull  Unilateral strengthening/stretching focus to restore neutral pelvis position  Decreased core strength; educated on PPT versus TA hold for stability work  Decreased right hip IR and left hip ER strength compared to contralateral side    Pain Level (0-10 scale) post treatment: 0/10    ASSESSMENT/Changes in Function: Pt with pelvic obliquity due to LLD, uneven sitting/sleeping and unilateral hypertonicity and hypotonicities. She demonstrates a shorter left leg with noticeable lateral shift in standing. She has decreased core stability and glute strength. Will work to improve postures throughout the day to improve pelvic alignment while continuing with unilateral strengthening/stretchingt to restore normal sacrum alignment for decreased pain with sleeping and walking. Patient will continue to benefit from skilled PT services to modify and progress therapeutic interventions, address functional mobility deficits, address ROM deficits, address strength deficits, analyze and address soft tissue restrictions, analyze and cue movement patterns, analyze and modify body mechanics/ergonomics, assess and modify postural abnormalities, address imbalance/dizziness and instruct in home and community integration to attain remaining goals. [x]  See Plan of Care  []  See progress note/recertification  []  See Discharge Summary         Progress towards goals / Updated goals:Short Term Goals: To be accomplished in 1 weeks:              1. Pt will be compliant with a HEP to ease with ADL's  Long Term Goals:  To be accomplished in 6 weeks:              1. Pt will improve FOTO score by 7  pts to improve function.                2. Pt will report >60% improvement to ease with ADL's.               3. Pt will report having minimal to no increased LBP or B LE pain with prolonged sitting at work and with sit to stands to improve ability to perform work activities.     PLAN  [x]  Upgrade activities as tolerated     [x]  Continue plan of care  []  Update interventions per flow sheet       []  Discharge due to:_  []  Other:_      Gabby Hitchcock PTA 6/14/2022  8:47 AM    Future Appointments   Date Time Provider Leanne Martin   6/14/2022  3:00 PM Sukhjinder Styles, PTA [facility]   6/16/2022  3:00 PM Vergladys Navarrete, PTA [facility]   6/21/2022  3:00 PM Florence Distance [facility]   6/24/2022  3:45 PM Florence Distance [facility]   6/28/2022  4:30 PM Airam Cohn, PT [facility]   7/1/2022  3:45 PM Verneda Landon, PTA [facility]   7/5/2022  3:45 PM Verneda Landon, PTA [facility]   7/7/2022  4:30 PM Florence Distance [facility]   7/11/2022  3:00 PM Airam Cohn, PT [facility]   7/14/2022  3:45 PM Verneda Landon, PTA [facility]

## 2022-06-16 ENCOUNTER — HOSPITAL ENCOUNTER (OUTPATIENT)
Dept: PHYSICAL THERAPY | Age: 67
Discharge: HOME | End: 2022-06-16
Payer: COMMERCIAL

## 2022-06-16 PROCEDURE — 97140 MANUAL THERAPY 1/> REGIONS: CPT

## 2022-06-16 PROCEDURE — 97110 THERAPEUTIC EXERCISES: CPT

## 2022-06-16 NOTE — PROGRESS NOTES
PT DAILY TREATMENT NOTE     Patient Name: Pop Flores  Date:2022  : 1955  [x]  Patient  Verified  Payor: Michael Esparza / Plan: [address] [place] [address] [place] [employer] / Product Type: HMO /    In time: 3:05  Out time: 3:55  Total Treatment Time (min): 50  Visit #: 3 of 18    Treatment Area: Other low back pain [M54.59]    SUBJECTIVE  Pain Level (0-10 scale): 3/10 glutes/proximal HS  Any medication changes, allergies to medications, adverse drug reactions, diagnosis change, or new procedure performed?: [x] No    [] Yes (see summary sheet for update)  Subjective functional status/changes:   [] No changes reported  Pt reports pain in the back of her legs pointing to distal buttock/proximal hamstring regions. She reports compliance with HEP. She was sore after last session but only until the next day. During session pt reports calf cramping. She usually gets them at night but sometimes during the day. If she moves her feet around a bit they go away. Whenever she turns over in bed she gets pain in her proximal hamstrings. OBJECTIVE    Modality rationale: decrease pain and increase tissue extensibility to improve the patients ability to perform ADLs with ease.    Min Type Additional Details    [] Estim: []Att   []Unatt  []TENS instruct                 []IFC  []Premod []NMES                       []Other:  []w/US   []w/ice   []w/heat  Position:  Location:    []  Traction: [] Cervical       []Lumbar                       [] Prone          []Supine                       []Intermittent   []Continuous Lbs:  [] before manual  [] after manual    []  Ultrasound: []Continuous   [] Pulsed                           []1MHz   []3MHz Location:  W/cm2:    []  Iontophoresis with dexamethasone         Location: [] Take home patch   [] In clinic   10 []  Ice     [x]  heat  []  Ice massage Position: supine with wedge  Location: l/s and glutes    []  Vasopneumatic Device:  If using vaso (only need to measure limb vaso being performed on) pre-treatment girth :       post-treatment girth :       measured at (landmark location)  Pressure: [] lo [] med [] hi   Temp: [] lo [] med [] hi   [x] Skin assessment post-treatment:  [x]intact []redness- no adverse reaction       []redness - adverse reaction:     32 min Therapeutic Exercise:  [x] See flow sheet :   Rationale: increase ROM, increase strength, improve coordination, improve balance and increase proprioception to improve the patients ability to ambulate and sleep with less pain    8 min Manual Therapy:  Leg lengthening for right upslip; MET for left PI/right AI; shotgun technique   The manual therapy interventions were performed at a separate and distinct time from the therapeutic activities interventions. Rationale: decrease pain, increase ROM and increase tissue extensibility to ambulate and sleep with decreased pain          With   [] TE   [] TA   [] neuro   [] other: Patient Education: [x] Review HEP    [] Progressed/Changed HEP based on:   [] positioning   [] body mechanics   [] transfers   [] heat/ice application    [] other:      Other Objective/Functional Measures:   - palpable ms spasms in left calf during session   - increased tension in distal HS and proximal calf  - good form with PPT    Pain Level (0-10 scale) post treatment: 4/10    ASSESSMENT/Changes in Function:   Patient demonstrates fair tolerance to session, requiring a few breaks due to gastroc spasms today. Minimal Increase in pain after session. Continues to present with pelvic obliquity.      Patient will continue to benefit from skilled PT services to modify and progress therapeutic interventions, address functional mobility deficits, address ROM deficits, address strength deficits, analyze and address soft tissue restrictions, analyze and cue movement patterns, analyze and modify body mechanics/ergonomics, assess and modify postural abnormalities, address imbalance/dizziness and instruct in home and community integration to attain remaining goals. [x]  See Plan of Care  []  See progress note/recertification  []  See Discharge Summary         Progress towards goals / Updated goals:Short Term Goals: To be accomplished in 1 weeks:  1. Pt will be compliant with a HEP to ease with ADL's. MET (6/16/22)  Long Term Goals: To be accomplished in 6 weeks:  1. Pt will improve FOTO score by 7  pts to improve function.    2. Pt will report >60% improvement to ease with ADL's.   3. Pt will report having minimal to no increased LBP or B LE pain with prolonged sitting at work and with sit to stands to improve ability to perform work activities.     PLAN  [x]  Upgrade activities as tolerated     [x]  Continue plan of care  []  Update interventions per flow sheet       []  Discharge due to:_  []  Other:_      Jenna Berger PTA 6/16/2022  3:55 PM    Future Appointments   Date Time Provider Leanne Martin   6/16/2022  3:00 PM Sabrina Barajas, PTA [facility]   6/21/2022  3:00 PM Liang Tod [facility]   6/24/2022  3:45 PM Liang Tod [facility]   6/28/2022  4:30 PM Danie Stewart, PT [facility]   7/1/2022  3:45 PM Verdavide Jenn, PTA [facility]   7/5/2022  3:45 PM Vernelle Jenn, PTA [facility]   7/7/2022  4:30 PM Liang Tod [facility]   7/11/2022  3:00 PM Danie Stewart, PT [facility]   7/14/2022  3:45 PM Vernelle Jenn, PTA [facility]

## 2022-06-21 ENCOUNTER — HOSPITAL ENCOUNTER (OUTPATIENT)
Dept: PHYSICAL THERAPY | Age: 67
Discharge: HOME | End: 2022-06-21
Payer: COMMERCIAL

## 2022-06-21 PROCEDURE — 97110 THERAPEUTIC EXERCISES: CPT

## 2022-06-21 NOTE — PROGRESS NOTES
PT DAILY TREATMENT NOTE     Patient Name: Jm Murillo  Date:2022  : 1955  [x]  Patient  Verified  Payor: Evita Suárez / Plan: [address] [place] [address] [place] [employer] / Product Type: HMO /    In time: 3:04  Out time: 3:42  Total Treatment Time (min): 38  Visit #: 4 of 18    Treatment Area: Other low back pain [M54.59]    SUBJECTIVE  Pain Level (0-10 scale): 3/10 glutes/proximal HS, Left>Right  Any medication changes, allergies to medications, adverse drug reactions, diagnosis change, or new procedure performed?: [x] No    [] Yes (see summary sheet for update)  Subjective functional status/changes:   [] No changes reported  Pt reports no significant changes since last session. She went to [place] this weekend and did a lot of walking. She did okay. OBJECTIVE    Modality rationale: decrease pain and increase tissue extensibility to improve the patients ability to perform ADLs with ease.    Min Type Additional Details    [] Estim: []Att   []Unatt  []TENS instruct                 []IFC  []Premod []NMES                       []Other:  []w/US   []w/ice   []w/heat  Position:  Location:    []  Traction: [] Cervical       []Lumbar                       [] Prone          []Supine                       []Intermittent   []Continuous Lbs:  [] before manual  [] after manual    []  Ultrasound: []Continuous   [] Pulsed                           []1MHz   []3MHz Location:  W/cm2:    []  Iontophoresis with dexamethasone         Location: [] Take home patch   [] In clinic   PD []  Ice     [x]  heat  []  Ice massage Position: supine with wedge  Location: l/s and glutes    []  Vasopneumatic Device:  If using vaso (only need to measure limb vaso being performed on)      pre-treatment girth :       post-treatment girth :       measured at (landmark location)  Pressure: [] lo [] med [] hi   Temp: [] lo [] med [] hi   [x] Skin assessment post-treatment:  [x]intact []redness- no adverse reaction       []redness - adverse reaction:     38 min Therapeutic Exercise:  [x] See flow sheet :   Rationale: increase ROM, increase strength, improve coordination, improve balance and increase proprioception to improve the patients ability to ambulate and sleep with less pain    - min Manual Therapy:  Leg lengthening for right upslip; MET for left PI/right AI; shotgun technique   The manual therapy interventions were performed at a separate and distinct time from the therapeutic activities interventions. Rationale: decrease pain, increase ROM and increase tissue extensibility to ambulate and sleep with decreased pain          With   [] TE   [] TA   [] neuro   [] other: Patient Education: [x] Review HEP    [] Progressed/Changed HEP based on:   [] positioning   [] body mechanics   [] transfers   [] heat/ice application    [] other:      Other Objective/Functional Measures:   - performed all stretches bilaterally  - added PPT with BTB  - added child pose S  - added LTR with TA    Pain Level (0-10 scale) post treatment: 1-2/10    ASSESSMENT/Changes in Function:   Patient presents with very minimal left upslip today but no pelvic rotation. She ambulates with lateral shift and demonstrates increased QL tightness. Patient declined MHP today, reporting minimal pain at end of session. Patient will continue to benefit from skilled PT services to modify and progress therapeutic interventions, address functional mobility deficits, address ROM deficits, address strength deficits, analyze and address soft tissue restrictions, analyze and cue movement patterns, analyze and modify body mechanics/ergonomics, assess and modify postural abnormalities, address imbalance/dizziness and instruct in home and community integration to attain remaining goals. [x]  See Plan of Care  []  See progress note/recertification  []  See Discharge Summary         Progress towards goals / Updated goals:Short Term Goals: To be accomplished in 1 weeks:  1.  Pt will be compliant with a HEP to ease with ADL's. MET (6/16/22)  Long Term Goals: To be accomplished in 6 weeks:  1. Pt will improve FOTO score by 7  pts to improve function.    2. Pt will report >60% improvement to ease with ADL's.   3. Pt will report having minimal to no increased LBP or B LE pain with prolonged sitting at work and with sit to stands to improve ability to perform work activities.     PLAN  [x]  Upgrade activities as tolerated     [x]  Continue plan of care  []  Update interventions per flow sheet       []  Discharge due to:_  []  Other:_      Maulik Burch PTA 6/21/2022  3:42 PM    Future Appointments   Date Time Provider Leanne Martin   6/21/2022  3:00 PM Filbert Carrier [facility]   6/24/2022  3:45 PM Filbert Carrier [facility]   6/28/2022  4:30 PM Linda Patel, PT [facility]   7/1/2022  3:45 PM Dossie Spearing, PTA [facility]   7/5/2022  3:45 PM Dossie Spearing, PTA [facility]   7/7/2022  4:30 PM Filbert Carrier [facility]   7/11/2022  3:00 PM Linda Patel, PT [facility]   7/14/2022  3:45 PM Dossie Spearing, PTA [facility]

## 2022-06-24 ENCOUNTER — HOSPITAL ENCOUNTER (OUTPATIENT)
Dept: PHYSICAL THERAPY | Age: 67
Discharge: HOME | End: 2022-06-24
Payer: COMMERCIAL

## 2022-06-24 PROCEDURE — 97140 MANUAL THERAPY 1/> REGIONS: CPT

## 2022-06-24 PROCEDURE — 97110 THERAPEUTIC EXERCISES: CPT

## 2022-06-24 NOTE — PROGRESS NOTES
PT DAILY TREATMENT NOTE     Patient Name: Skippy Shone  Date:2022  : 1955  [x]  Patient  Verified  Payor: Sarath Wong / Plan: [employer] [place] [employer] / Product Type: HMO /    In time: 3:52  Out time: 4:30  Total Treatment Time (min): 38  Visit #: 5 of 18    Treatment Area: Other low back pain [M54.59]    SUBJECTIVE  Pain Level (0-10 scale): 0/10 l/s  Any medication changes, allergies to medications, adverse drug reactions, diagnosis change, or new procedure performed?: [x] No    [] Yes (see summary sheet for update)  Subjective functional status/changes:   [x] No changes reported  Pt reports no pain in her back just her usual pain in hamstrings. OBJECTIVE    Modality rationale: decrease pain and increase tissue extensibility to improve the patients ability to perform ADLs with ease.    Min Type Additional Details    [] Estim: []Att   []Unatt  []TENS instruct                 []IFC  []Premod []NMES                       []Other:  []w/US   []w/ice   []w/heat  Position:  Location:    []  Traction: [] Cervical       []Lumbar                       [] Prone          []Supine                       []Intermittent   []Continuous Lbs:  [] before manual  [] after manual    []  Ultrasound: []Continuous   [] Pulsed                           []1MHz   []3MHz Location:  W/cm2:    []  Iontophoresis with dexamethasone         Location: [] Take home patch   [] In clinic   PD []  Ice     [x]  heat  []  Ice massage Position: supine with wedge  Location: l/s and glutes    []  Vasopneumatic Device:  If using vaso (only need to measure limb vaso being performed on)      pre-treatment girth :       post-treatment girth :       measured at (landmark location)  Pressure: [] lo [] med [] hi   Temp: [] lo [] med [] hi   [x] Skin assessment post-treatment:  [x]intact []redness- no adverse reaction       []redness - adverse reaction:     28 min Therapeutic Exercise:  [x] See flow sheet :   Rationale: increase ROM, increase strength, improve coordination, improve balance and increase proprioception to improve the patients ability to ambulate and sleep with less pain    10 min Manual Therapy:  Leg lengthening for left upslip; MET for left PI/right AI; shotgun technique   The manual therapy interventions were performed at a separate and distinct time from the therapeutic activities interventions. Rationale: decrease pain, increase ROM and increase tissue extensibility to ambulate and sleep with decreased pain          With   [] TE   [] TA   [] neuro   [] other: Patient Education: [x] Review HEP    [] Progressed/Changed HEP based on:   [] positioning   [] body mechanics   [] transfers   [] heat/ice application    [] other:      Other Objective/Functional Measures:   - progressed to PPT with march  - cuing to maintain PPT with bridges   - progressed reps on bridges and clams    Pain Level (0-10 scale) post treatment: 0/10    ASSESSMENT/Changes in Function:   Patient tolerated session well, with continued presentation of pelvic obliquity. Also noted to have a Left lateral shift while ambulating. Plan to trial Right hip shifts [place] and re-assess LLD and assess if extra heel lift required. Patient will continue to benefit from skilled PT services to modify and progress therapeutic interventions, address functional mobility deficits, address ROM deficits, address strength deficits, analyze and address soft tissue restrictions, analyze and cue movement patterns, analyze and modify body mechanics/ergonomics, assess and modify postural abnormalities, address imbalance/dizziness and instruct in home and community integration to attain remaining goals. [x]  See Plan of Care  []  See progress note/recertification  []  See Discharge Summary         Progress towards goals / Updated goals:Short Term Goals: To be accomplished in 1 weeks:  1. Pt will be compliant with a HEP to ease with ADL's. MET (6/16/22)  Long Term Goals:  To be accomplished in 6 weeks: 1. Pt will improve FOTO score by 7  pts to improve function. 2. Pt will report >60% improvement to ease with ADL's.   Current: 50% (6/24/22)  3.  Pt will report having minimal to no increased LBP or B LE pain with prolonged sitting at work and with sit to stands to improve ability to perform work activities.     PLAN  [x]  Upgrade activities as tolerated     [x]  Continue plan of care  []  Update interventions per flow sheet       []  Discharge due to:_  []  Other:_      Jenna Berger PTA 6/24/2022  4:30 PM    Future Appointments   Date Time Provider Leanne Martin   6/24/2022  3:45 PM Liang Tod [facility]   6/28/2022  4:30 PM Danie Stewart, PT [facility]   7/1/2022  3:45 PM Liang Tod [facility]   7/5/2022  3:45 PM Liang Tod [facility]   7/7/2022  4:30 PM Liang Tod [facility]   7/11/2022  3:00 PM Danie Stewart, PT [facility]   7/14/2022  3:45 PM Sabrina Barajas PTA [facility]

## 2022-06-28 ENCOUNTER — HOSPITAL ENCOUNTER (OUTPATIENT)
Dept: PHYSICAL THERAPY | Age: 67
Discharge: HOME | End: 2022-06-28
Payer: COMMERCIAL

## 2022-06-28 PROCEDURE — 97110 THERAPEUTIC EXERCISES: CPT

## 2022-06-28 NOTE — PROGRESS NOTES
PT DAILY TREATMENT NOTE - [facility]     Patient Name: Giselle Larson  Date:2022  : 1955  [x]  Patient  Verified  Payor: Jason Christi / Plan: [place] [employer] / Product Type: HMO /    In time:434  Out time:530  Total Treatment Time (min): 56  Visit #: 6 of     Treatment Area: Other low back pain [M54.59]    SUBJECTIVE  Pain Level (0-10 scale): 2-3/10  Any medication changes, allergies to medications, adverse drug reactions, diagnosis change, or new procedure performed?: [x] No    [] Yes (see summary sheet for update)  Subjective functional status/changes:   [] No changes reported  Reports she went to [place] a couple weeks ago and dis a lot of walking and her legs and back did not hurt.     OBJECTIVE    Modality rationale: decrease pain to improve the patients ability to ease with adl's   Min Type Additional Details    [] Estim:  []Unatt       []IFC  []Premod                        []Other:  []w/ice   []w/heat  Position:  Location:    [] Estim: []Att    []TENS instruct  []NMES                    []Other:  []w/US   []w/ice   []w/heat  Position:  Location:    []  Traction: [] Cervical       []Lumbar                       [] Prone          []Supine                       []Intermittent   []Continuous Lbs:  [] before manual  [] after manual    []  Ultrasound: []Continuous   [] Pulsed                           []1MHz   []3MHz W/cm2:  Location:    []  Iontophoresis with dexamethasone         Location: [] Take home patch   [] In clinic   10 []  Ice     [x]  heat  []  Ice massage  []  Laser   []  Anodyne Position: supine with wedge  Location: HS/ bilateral    []  Laser with stim  []  Other:  Position:  Location:    []  Vasopneumatic Device Pressure:       [] lo [] med [] hi   Temperature: [] lo [] med [] hi   [] Skin assessment post-treatment:  []intact []redness- no adverse reaction      46 min Therapeutic Exercise:  [] See flow sheet :   Rationale: increase ROM, increase strength, improve coordination and improve balance to improve the patients ability to ease with adl's          With   [] TE   [] TA   [] neuro   [] other: Patient Education: [x] Review HEP    [] Progressed/Changed HEP based on:   [] positioning   [] body mechanics   [] transfers   [] heat/ice application    [] other:      Other Objective/Functional Measures:   Ball massage to bilateral HS insertion at tuberosity. Prone Sacral springing x10  Reports Pulling sensation to left >right HS during LTR. Improved quality of pelvic tilt compared to eval.  Pain Level (0-10 scale) post treatment: 2/10    ASSESSMENT/Changes in Function: Progressing well. Pt reports sx improvement so far and improved tolerance to ambulation. Unable to palpate any tenderness throughout glutes and hips. Mild tenderness to right HS. Patient will continue to benefit from skilled PT services to modify and progress therapeutic interventions, address functional mobility deficits, address ROM deficits, address strength deficits, analyze and address soft tissue restrictions, analyze and cue movement patterns, analyze and modify body mechanics/ergonomics, assess and modify postural abnormalities and address imbalance/dizziness to attain remaining goals. []  See Plan of Care  [x]  See progress note/recertification  []  See Discharge Summary         Progress towards goals / Updated goals:  Goals: To be accomplished in 1 weeks:  1. Pt will be compliant with a HEP to ease with ADL's. MET (6/16/22)  Long Term Goals: To be accomplished in 6 weeks:  1. Pt will improve FOTO score by 7  pts to improve function. 2. Pt will report >60% improvement to ease with ADL's.   Current: 50% (6/24/22)  3.  Pt will report having minimal to no increased LBP or B LE pain with prolonged sitting at work and with sit to stands to improve ability to perform work activities.     PLAN  [x]  Upgrade activities as tolerated     [x]  Continue plan of care  []  Update interventions per flow sheet       []  Discharge due to:_  []  Other:_      Nai Belle, PT 6/28/2022  4:39 PM    Future Appointments   Date Time Provider Leanne Martin   7/1/2022  3:45 PM Paige Schroeder, PTA [facility]   7/6/2022  3:00 PM Mimi Wallis [facility]   7/7/2022  4:30 PM Paige Schroeder, PTA [facility]   7/11/2022  3:00 PM Phyllis Severino, PT [facility]   7/14/2022  3:45 PM Leim Kawasaki, Donnamaria Lew, PTA [facility]

## 2022-07-01 ENCOUNTER — HOSPITAL ENCOUNTER (OUTPATIENT)
Dept: PHYSICAL THERAPY | Age: 67
Discharge: HOME | End: 2022-07-01
Payer: COMMERCIAL

## 2022-07-01 PROCEDURE — 97110 THERAPEUTIC EXERCISES: CPT

## 2022-07-01 NOTE — PROGRESS NOTES
PT DAILY TREATMENT NOTE - [facility]     Patient Name: Kennedy Hansen  Date:2022  : 1955  [x]  Patient  Verified  Payor: Rayshawn Fairchild / Plan: Bull Sosa HMO / Product Type: HMO /    In time: 3:48  Out time: 4:30  Total Treatment Time (min): 42  Visit #: 7 of     Treatment Area: Other low back pain [M54.59]    SUBJECTIVE  Pain Level (0-10 scale): 0/10 LBP, 4/10 hamstrings  Any medication changes, allergies to medications, adverse drug reactions, diagnosis change, or new procedure performed?: [x] No    [] Yes (see summary sheet for update)  Subjective functional status/changes:   [] No changes reported  Patient reports her back is feeling better. No changes to report.      OBJECTIVE    Modality rationale: decrease pain to improve the patients ability to ease with adl's   Min Type Additional Details    [] Estim:  []Unatt       []IFC  []Premod                        []Other:  []w/ice   []w/heat  Position:  Location:    [] Estim: []Att    []TENS instruct  []NMES                    []Other:  []w/US   []w/ice   []w/heat  Position:  Location:    []  Traction: [] Cervical       []Lumbar                       [] Prone          []Supine                       []Intermittent   []Continuous Lbs:  [] before manual  [] after manual    []  Ultrasound: []Continuous   [] Pulsed                           []1MHz   []3MHz W/cm2:  Location:    []  Iontophoresis with dexamethasone         Location: [] Take home patch   [] In clinic   PD []  Ice     [x]  heat  []  Ice massage  []  Laser   []  Anodyne Position: supine with wedge  Location: HS/ bilateral    []  Laser with stim  []  Other:  Position:  Location:    []  Vasopneumatic Device Pressure:       [] lo [] med [] hi   Temperature: [] lo [] med [] hi   [] Skin assessment post-treatment:  []intact []redness- no adverse reaction      42 min Therapeutic Exercise:  [] See flow sheet :   Rationale: increase ROM, increase strength, improve coordination and improve balance to improve the patients ability to ease with adl's          With   [] TE   [] TA   [] neuro   [] other: Patient Education: [x] Review HEP    [] Progressed/Changed HEP based on:   [] positioning   [] body mechanics   [] transfers   [] heat/ice application    [] other:      Other Objective/Functional Measures:   - no changes to program  - no pain with program  - moderate challenge with strengthening therex    Pain Level (0-10 scale) post treatment: 0/10    ASSESSMENT/Changes in Function:   Patient continues to progress towards goals with no back pain just hamstring pain when presenting to sessions. No pain in hamstrings at end of session after performing stretches. Will continue to progress as able. Patient may benefit from increased rest breaks at work to stretch to increase sitting tolerance. Plan to educate patient on self-massage to hamstring with ball or foam roller at home. Patient will continue to benefit from skilled PT services to modify and progress therapeutic interventions, address functional mobility deficits, address ROM deficits, address strength deficits, analyze and address soft tissue restrictions, analyze and cue movement patterns, analyze and modify body mechanics/ergonomics, assess and modify postural abnormalities and address imbalance/dizziness to attain remaining goals. []  See Plan of Care  [x]  See progress note/recertification  []  See Discharge Summary         Progress towards goals / Updated goals:  Goals: To be accomplished in 1 weeks:  1. Pt will be compliant with a HEP to ease with ADL's. MET (6/16/22)  Long Term Goals: To be accomplished in 6 weeks:  1. Pt will improve FOTO score by 7  pts to improve function. 2. Pt will report >60% improvement to ease with ADL's.   Current: 50% (6/24/22)  3.  Pt will report having minimal to no increased LBP or B LE pain with prolonged sitting at work and with sit to stands to improve ability to perform work activities.   Current: sitting tolerance <1 hour (7/1/22)    PLAN  [x]  Upgrade activities as tolerated     [x]  Continue plan of care  []  Update interventions per flow sheet       []  Discharge due to:_  [x]  Other:_PN due Darryl, PTA 7/1/2022  4:30 PM    Future Appointments   Date Time Provider Leanne Martin   7/1/2022  3:45 PM Neil Crigler, PTA [facility]   7/6/2022  3:00 PM Sukhjinder Lane, PTA [facility]   7/7/2022  4:30 PM Neil Crigler, PTA [facility]   7/11/2022  3:00 PM Bre Choe, PT [facility]   7/14/2022  3:45 PM Aurelio Lange, Kaila Sandoval, PTA [facility]

## 2022-07-05 ENCOUNTER — APPOINTMENT (OUTPATIENT)
Dept: PHYSICAL THERAPY | Age: 67
End: 2022-07-05
Payer: COMMERCIAL

## 2022-07-06 ENCOUNTER — HOSPITAL ENCOUNTER (OUTPATIENT)
Dept: PHYSICAL THERAPY | Age: 67
Discharge: HOME | End: 2022-07-06
Payer: COMMERCIAL

## 2022-07-06 PROCEDURE — 97110 THERAPEUTIC EXERCISES: CPT

## 2022-07-06 PROCEDURE — 97530 THERAPEUTIC ACTIVITIES: CPT

## 2022-07-06 NOTE — PROGRESS NOTES
PT DAILY TREATMENT NOTE - [facility]     Patient Name: Miguel Mondragon  Date:2022  : 1955  [x]  Patient  Verified  Payor: Melodie Carbajal / Plan: Bri Lehman HMO / Product Type: HMO /    In time: 3:02  Out time: 3:40  Total Treatment Time (min): 38  Visit #: 8 of     Treatment Area: Other low back pain [M54.59]    SUBJECTIVE  Pain Level (0-10 scale): 0/10 LBP  Any medication changes, allergies to medications, adverse drug reactions, diagnosis change, or new procedure performed?: [x] No    [] Yes (see summary sheet for update)  Subjective functional status/changes:   [] No changes reported  Patient reports no pain in her back today just her usual spot on the hamstrings. She is unsure if she wants to continue therapy.      OBJECTIVE    Modality rationale: decrease pain to improve the patients ability to ease with adl's   Min Type Additional Details    [] Estim:  []Unatt       []IFC  []Premod                        []Other:  []w/ice   []w/heat  Position:  Location:    [] Estim: []Att    []TENS instruct  []NMES                    []Other:  []w/US   []w/ice   []w/heat  Position:  Location:    []  Traction: [] Cervical       []Lumbar                       [] Prone          []Supine                       []Intermittent   []Continuous Lbs:  [] before manual  [] after manual    []  Ultrasound: []Continuous   [] Pulsed                           []1MHz   []3MHz W/cm2:  Location:    []  Iontophoresis with dexamethasone         Location: [] Take home patch   [] In clinic   PD []  Ice     [x]  heat  []  Ice massage  []  Laser   []  Anodyne Position: supine with wedge  Location: HS/ bilateral    []  Laser with stim  []  Other:  Position:  Location:    []  Vasopneumatic Device Pressure:       [] lo [] med [] hi   Temperature: [] lo [] med [] hi   [] Skin assessment post-treatment:  []intact []redness- no adverse reaction      23 min Therapeutic Exercise:  [] See flow sheet :   Rationale: increase ROM, increase strength, improve coordination and improve balance to improve the patients ability to ease with adl's          15 As   [] TE   [x] TA   [] neuro   [] other: Patient Education: [x] Review HEP    [x] Progressed/Changed HEP based on:   [] positioning   [] body mechanics   [] transfers   [] heat/ice application    [x] other: FOTO and re-assess goals      Other Objective/Functional Measures: FOTO: 69/100    Lumbar ROM:  Flexion: fingertips to toes, HS tightness  SB: to knee jt line  Ext: 50%  ROT: 75% bilaterally     Pain Level (0-10 scale) post treatment: 0/10    ASSESSMENT/Changes in Function: SEE DISCHARGE SUMMARY     []  See Plan of Care  []  See progress note/recertification  [x]  See Discharge Summary         Progress towards goals / Updated goals:   Goals: To be accomplished in 1 weeks:  1. Pt will be compliant with a HEP to ease with ADL's. Current: MET (6/16/22)    Long Term Goals: To be accomplished in 6 weeks:  1. Pt will improve FOTO score by 7  pts to improve function. Current: MET: 69/100  2. Pt will report >60% improvement to ease with ADL's.   Current: 50% (6/24/22), 80% (7/6/220  3.  Pt will report having minimal to no increased LBP or B LE pain with prolonged sitting at work and with sit to stands to improve ability to perform work activities.   Current: sitting tolerance <1 hour (7/1/22)    PLAN  [x]  Upgrade activities as tolerated     [x]  Continue plan of care  []  Update interventions per flow sheet       [x]  Discharge due to:_patient has met or is progressing towards all goals  []  Other:     Lora Lua PTA 7/6/2022  3:40 PM    Future Appointments   Date Time Provider Leanne Martin   7/6/2022  3:00 PM Reida Draft [facility]   7/7/2022  4:30 PM Reida Draft [facility]   7/11/2022  3:00 PM Jarett Desai PT [facility]   7/14/2022  3:45 PM Blanka Goldstein PTA [facility]

## 2022-07-06 NOTE — PROGRESS NOTES
Physical Therapy Discharge Instructions      [facility]  [address] [facility]  [phone] fax    Patient: Kennedy Hansen  : 1955      Continue Home Exercise Program 5 times per week      Continue with    [x] Ice  as needed      [x] Heat           Follow up with MD:     [] Upon completion of therapy     [x] As needed      Recommendations:     [x]   Return to activity with home program    []   Return to activity with the following modifications:       []Post Rehab Program    []Join Independent aquatic program     []Return to/join local gym      Olga Benavides PTA  2022  4:33 PM

## 2022-07-06 NOTE — PROGRESS NOTES
[facility] - [facility]  [address] [place] [address]  [phone] fax    Physical Therapy Discharge Summary    Patient name: Melissa Banegas Start of Care: 2022   Referral source: Eleanor Dela Cruz DO : 1955   Medical/Treatment Diagnosis: Other low back pain [M54.59]  Payor: Mckenzie Hamilton / Plan: [employer] 93 / Product Type: HMO /  Onset Date: 2022     Prior Hospitalization: see medical history Provider#: [id number]   Medications: Verified on Patient Summary List    Comorbidities: arthritis  Prior Level of Function: independent with ADLs, functional, and work activities with no limitations. Visits from Start of Care: 8    Missed Visits: 0    Reporting Period : 2022 to 2022    Summary of Care:  1. Pt will be compliant with a HEP to ease with ADL's. Status at last note/certification: initiated  Status at discharge: MET      [address] [facility] be accomplished in 6 weeks:  1. Pt will improve FOTO score by 7  pts to improve function. Status at last note/certification: 39/699  Status at discharge: MET: 69/100    2. Pt will report >60% improvement to ease with ADL's.   Status at last note/certification: new goal  Status at discharge: 80%     3. Pt will report having minimal to no increased LBP or B LE pain with prolonged sitting at work and with sit to stands to improve ability to perform work activities.   Status at last note/certification: new goal   Status at discharge: sitting tolerance <1 hour     ASSESSMENT/RECOMMENDATIONS:   Patient has made good progress towards goals and continues to report no back pain, just hamstring pain from an old injury. She was unable to verbalize any areas she needs to continue to work on in therapy and feels she is able to maintain progress with HEP.  She has been seen for 8 visits and therapy has included: therapeutic exercise, therapeutic activity, neuromuscular re-education, manual therapy, modalities for pain control, patient education and HEP. Pt reports 80% overall improvement with functional ADL's since beginning PT. Jose Antonio Genoveva Patient reports the following functional improvements in therapy: decreased pain, standing tolerance, walking tolerance, sitting tolerance.     [x]Discontinue therapy: [x]Patient has reached or is progressing toward set goals      []Patient is non-compliant or has abdicated      []Due to lack of appreciable progress towards set [address] PTA   7/6/2022  4:58 PM

## 2022-07-07 ENCOUNTER — APPOINTMENT (OUTPATIENT)
Dept: PHYSICAL THERAPY | Age: 67
End: 2022-07-07
Payer: COMMERCIAL

## 2022-07-11 ENCOUNTER — APPOINTMENT (OUTPATIENT)
Dept: PHYSICAL THERAPY | Age: 67
End: 2022-07-11
Payer: COMMERCIAL

## 2022-07-14 ENCOUNTER — APPOINTMENT (OUTPATIENT)
Dept: PHYSICAL THERAPY | Age: 67
End: 2022-07-14
Payer: COMMERCIAL

## 2023-01-31 ENCOUNTER — TRANSCRIBE ORDERS (OUTPATIENT)
Facility: HOSPITAL | Age: 68
End: 2023-01-31

## 2023-01-31 ENCOUNTER — TRANSCRIBE ORDER (OUTPATIENT)
Dept: SCHEDULING | Age: 68
End: 2023-01-31

## 2023-01-31 DIAGNOSIS — Z12.31 VISIT FOR SCREENING MAMMOGRAM: Primary | ICD-10-CM

## 2023-02-05 DIAGNOSIS — Z12.31 VISIT FOR SCREENING MAMMOGRAM: Primary | ICD-10-CM

## 2023-02-23 ENCOUNTER — HOSPITAL ENCOUNTER (OUTPATIENT)
Facility: HOSPITAL | Age: 68
Discharge: HOME | End: 2023-02-23
Payer: COMMERCIAL

## 2023-02-23 DIAGNOSIS — Z12.31 VISIT FOR SCREENING MAMMOGRAM: ICD-10-CM

## 2023-02-23 PROCEDURE — 77067 SCR MAMMO BI INCL CAD: CPT

## 2023-06-07 ENCOUNTER — HOSPITAL ENCOUNTER (OUTPATIENT)
Facility: HOSPITAL | Age: 68
Discharge: HOME | End: 2023-06-10
Payer: COMMERCIAL

## 2023-06-07 DIAGNOSIS — M81.0 AGE-RELATED OSTEOPOROSIS WITHOUT CURRENT PATHOLOGICAL FRACTURE: ICD-10-CM

## 2023-06-07 PROCEDURE — 77080 DXA BONE DENSITY AXIAL: CPT

## 2024-03-06 ENCOUNTER — TRANSCRIBE ORDERS (OUTPATIENT)
Facility: HOSPITAL | Age: 69
End: 2024-03-06

## 2024-03-06 DIAGNOSIS — Z12.31 VISIT FOR SCREENING MAMMOGRAM: Primary | ICD-10-CM

## 2024-03-19 ENCOUNTER — HOSPITAL ENCOUNTER (OUTPATIENT)
Facility: HOSPITAL | Age: 69
Discharge: HOME | End: 2024-03-22
Payer: COMMERCIAL

## 2024-03-19 DIAGNOSIS — Z12.31 VISIT FOR SCREENING MAMMOGRAM: ICD-10-CM

## 2024-03-19 PROCEDURE — 77063 BREAST TOMOSYNTHESIS BI: CPT

## 2025-03-13 ENCOUNTER — TRANSCRIBE ORDERS (OUTPATIENT)
Facility: HOSPITAL | Age: 70
End: 2025-03-13

## 2025-03-13 DIAGNOSIS — Z12.31 ENCOUNTER FOR SCREENING MAMMOGRAM FOR MALIGNANT NEOPLASM OF BREAST: Primary | ICD-10-CM

## 2025-04-21 ENCOUNTER — HOSPITAL ENCOUNTER (OUTPATIENT)
Facility: HOSPITAL | Age: 70
Discharge: HOME | End: 2025-04-24
Payer: MEDICARE

## 2025-04-21 DIAGNOSIS — Z12.31 ENCOUNTER FOR SCREENING MAMMOGRAM FOR MALIGNANT NEOPLASM OF BREAST: ICD-10-CM

## 2025-04-21 PROCEDURE — 77063 BREAST TOMOSYNTHESIS BI: CPT

## 2025-05-08 ENCOUNTER — TRANSCRIBE ORDERS (OUTPATIENT)
Facility: HOSPITAL | Age: 70
End: 2025-05-08

## 2025-05-08 DIAGNOSIS — R22.31 LOCALIZED SWELLING, MASS, OR LUMP OF UPPER EXTREMITY, RIGHT: Primary | ICD-10-CM

## 2025-05-29 ENCOUNTER — TRANSCRIBE ORDERS (OUTPATIENT)
Facility: HOSPITAL | Age: 70
End: 2025-05-29

## 2025-05-29 DIAGNOSIS — N63.31 UNSPECIFIED LUMP IN AXILLARY TAIL OF THE RIGHT BREAST: Primary | ICD-10-CM

## 2025-06-02 ENCOUNTER — HOSPITAL ENCOUNTER (OUTPATIENT)
Facility: HOSPITAL | Age: 70
Setting detail: RECURRING SERIES
Discharge: HOME | End: 2025-06-05
Payer: MEDICARE

## 2025-06-02 PROCEDURE — 97110 THERAPEUTIC EXERCISES: CPT

## 2025-06-02 PROCEDURE — 97162 PT EVAL MOD COMPLEX 30 MIN: CPT

## 2025-06-02 NOTE — PROGRESS NOTES
[facility]  [address] [place] [address] Ph:[phone] Fx: [phone]  Plan of Care / Statement of Necessity for Physical Therapy Services     Patient Name: Sherrell Horowitz : 1955   Medical   Diagnosis: Disorder of muscle, unspecified [M62.9]  Right leg pain [M79.604]  Left leg pain [M79.605]  Acute bilateral low back pain [M54.50] Treatment Diagnosis: M79.604  Pain in right leg and M79.605  Pain in left leg  and M54.59  OTHER LOWER BACK PAIN      Onset Date:  Payor :  Payor: MEDICARE / Plan: MEDICARE PART A AND B / Product Type: *No Product type* /    Referral Source: Batool Hale DO Start of Care (SOC): 2025   Prior Hospitalization: See medical history Provider #: [id number]   Prior Level of Function: Independent, caring for elderly mother   Comorbidities:  arthritis, depression, osteoporosis      Assessment / key information:  Patient is a 68 y/o male with chief complaint of low back, buttock and proximal posterior thigh pain B. Patient reports pain in posterior proximal thighs, buttocks, and low back when walking and standing. She states she has much pain and difficulty helping her elderly mother in and out of bed/chair due to pain.      Objective:     Posture:  Lateral Shift:    [] R    [] L     [] +  [] -  Kyphosis:        [] Increased   [] Decreased   []  WNL  Lordosis:         [x] Increased   [] Decreased   [] WNL  Pelvic symmetry: [x] WNL      [] Other:     Gait:                [] Normal       [] Abnormal:     Active Movements: [] N/A   [] Too acute   [] Other:  ROM % AROM % PROM Comments:pain, area   Forward flexion 40-60 100   P! 4/10 no lordosis reversal    Extension 20-30 50   P! 3/10   SB right 20-30 50   P! 4/10   SB left 20-30 50   P! 4/10   Rotation right 5-10 100   No p!   Rotation left 5-10 100   No p!     Dural Mobility:  SLR Sitting:     [x] R    [x] L    [] +    [x] -  @ (degrees):           Supine:

## 2025-06-02 NOTE — PROGRESS NOTES
PT DAILY TREATMENT NOTE/LUMBAR EVAL     Patient Name: Sherrell Horowitz    Date: 2025    : 1955  Insurance: Payor: MEDICARE / Plan: MEDICARE PART A AND B / Product Type: *No Product type* /      Patient  verified yes     Visit #   Current / Total 1 24   Time   In / Out 10:03 10:40   Pain   In / Out 2/10 2/10   Subjective Functional Status/Changes: See below       Treatment Area: Disorder of muscle, unspecified [M62.9]  SUBJECTIVE  Pain Level (0-10 scale): Best: 10 Worst: 10  []constant []intermittent []improving []worsening []no change since onset    Current symptoms/Complaints: Patient states she had a hamstring issue ~ 6 years ago but she had PT for it and the pain resolved. She states her current pain is different and located now in proximal posterior thighs (B), buttocks, and LB. States she cannot stand up straight when she walks. States the pain is present when she stands in one spot. States she cannot help her mother get out of bed due to the pain. States she is unable to walk 1/2 mile due to pain.  She reports no pain in sitting.     PMH: arthritis, depression, osteoporosis      OBJECTIVE/EXAMINATION    Mobility: [x] No assistive device [] RW [] Rollator [] Hemiwalker [] LBQC [] SBQC [] SPC [] Other:   Self Care: having difficulty caring for her elderly mother    27 min []Eval        - untimed        Therapeutic Procedures:  Tx Min Billable or 1:1 Min (if diff from Tx Min) Procedure, Rationale, Specifics   10  30526 Therapeutic Exercise (timed):  increase ROM, strength, coordination, balance, and proprioception to improve patient's ability to progress to PLOF and address remaining functional goals. (see flow sheet as applicable)     Details if applicable:     Total  10 Total Reminder: MC/BC bill using total billable min of TIMED therapeutic procedures (example: do not include dry needle or estim unattended, both untimed codes, in totals to left)     [x]  Patient Education billed

## 2025-06-06 ENCOUNTER — APPOINTMENT (OUTPATIENT)
Facility: HOSPITAL | Age: 70
End: 2025-06-06
Payer: MEDICARE

## 2025-06-10 ENCOUNTER — HOSPITAL ENCOUNTER (OUTPATIENT)
Facility: HOSPITAL | Age: 70
Setting detail: RECURRING SERIES
Discharge: HOME | End: 2025-06-13
Payer: MEDICARE

## 2025-06-10 PROCEDURE — 97530 THERAPEUTIC ACTIVITIES: CPT

## 2025-06-10 PROCEDURE — 97110 THERAPEUTIC EXERCISES: CPT

## 2025-06-10 PROCEDURE — 97112 NEUROMUSCULAR REEDUCATION: CPT

## 2025-06-10 NOTE — PROGRESS NOTES
posture/ergonomics, home safety, activity modification, transfer techniques, and joint protection strategies  to improve patient's ability to progress to PLOF and address remaining functional goals.  (see flow sheet as applicable)     Details if applicable:     45  MC BC Totals Reminder: bill using total billable min of TIMED therapeutic procedures (example: do not include dry needle or estim unattended, both untimed codes, in totals to left)  8-22 min = 1 unit; 23-37 min = 2 units; 38-52 min = 3 units; 53-67 min = 4 units; 68-82 min = 5 units   Total Total       Charge Capture    [x]  Patient Education billed concurrently with other procedures   [x] Review HEP    [] Progressed/Changed HEP  [] Other:    Objective Information/Functional Measures/Assessment    Progressed per flowsheet w/ no chief complaints     Pt demonstrated overall good tolerance with progressions today. PT session focused on HEP review and introduction of supine lumbo-pelvic stabilization exercises. Pt continues to display difficulty with abdominal bracing and proper movement pattern for STS. Pt noted moderate LE soreness following session, note expected level of soreness.       Patient will continue to benefit from skilled PT services to modify and progress therapeutic interventions, analyze and address functional mobility deficits, analyze and address strength deficits, and analyze and cue for proper movement patterns to address functional deficits and attain remaining goals.    Progress toward goals / Updated goals:  []  See Progress Note/Recertification    Short Term Goals: To be accomplished in 4-6 weeks  Goal: Patient will be compliant with home exercise program to decrease pain and improve function.  Status at evaluation: HEP issued   HEP Reviewed (06/10/25)  2.   Goal: Patient will demonstrate 90/90 hamstring test to < 20 deg B to indicate improved B LE flexibility needed to decrease pain with activity.   Status at evaluation: 30 deg B

## 2025-06-11 ENCOUNTER — HOSPITAL ENCOUNTER (OUTPATIENT)
Facility: HOSPITAL | Age: 70
Setting detail: RECURRING SERIES
Discharge: HOME | End: 2025-06-14
Payer: MEDICARE

## 2025-06-11 PROCEDURE — 97530 THERAPEUTIC ACTIVITIES: CPT

## 2025-06-11 PROCEDURE — 97110 THERAPEUTIC EXERCISES: CPT

## 2025-06-11 PROCEDURE — 97112 NEUROMUSCULAR REEDUCATION: CPT

## 2025-06-11 NOTE — PROGRESS NOTES
PHYSICAL THERAPY - DAILY TREATMENT NOTE (updated )    Patient Name: Sherrell Horowitz    Date: 2025    : 1955  Insurance: Payor: MEDICARE / Plan: MEDICARE PART A AND B / Product Type: *No Product type* /      Patient  verified yes     Visit #   Current / Total 3 24   Time   In / Out 12:39 1:20   Pain   In / Out 3/10 1/10   Subjective Functional Status/Changes: Reports compliance with HEP. Reports no significant soreness after last session.      TREATMENT AREA =  Disorder of muscle, unspecified  Hamstring tightness of both lower extremities    Next PN due 25  Auth due JARRETT    OBJECTIVE    Therapeutic Procedures:  Tx Min Billable or 1:1 Min (if diff from Tx Min) Procedure, Rationale, Specifics   17  50102 Therapeutic Exercise (timed):  increase ROM, strength, coordination, balance, and proprioception to improve patient's ability to progress to PLOF and address remaining functional goals. (see flow sheet as applicable)     Details if applicable:  H/L hip abd/add, SLR x 3, child's pose, thread the needle, 1/2 moon stretch     11  64490 Therapeutic Activity (timed):  use of dynamic activities replicating functional movements to increase ROM, strength, coordination, balance, and proprioception in order to improve patient's ability to progress to PLOF and address remaining functional goals.  (see flow sheet as applicable)     Details if applicable:  Arti, H/L    13  85630 Neuromuscular Re-Education (timed):  improve balance, coordination, kinesthetic sense, posture, core stability and proprioception to improve patient's ability to develop conscious control of individual muscles and awareness of position of extremities in order to progress to PLOF and address remaining functional goals. (see flow sheet as applicable)     Details if applicable:  H/L TA draw, S/L clam, cat/cow   41  [facility] Totals Reminder: bill using total billable min of TIMED therapeutic procedures (example: do not

## 2025-06-18 ENCOUNTER — HOSPITAL ENCOUNTER (OUTPATIENT)
Facility: HOSPITAL | Age: 70
Setting detail: RECURRING SERIES
Discharge: HOME | End: 2025-06-21
Payer: MEDICARE

## 2025-06-18 PROCEDURE — 97530 THERAPEUTIC ACTIVITIES: CPT

## 2025-06-18 PROCEDURE — 97112 NEUROMUSCULAR REEDUCATION: CPT

## 2025-06-18 PROCEDURE — 97110 THERAPEUTIC EXERCISES: CPT

## 2025-06-18 NOTE — PROGRESS NOTES
PHYSICAL THERAPY - DAILY TREATMENT NOTE (updated )    Patient Name: Sherrell Horowitz    Date: 2025    : 1955  Insurance: Payor: MEDICARE / Plan: MEDICARE PART A AND B / Product Type: *No Product type* /      Patient  verified yes     Visit #   Current / Total 4 24   Time   In / Out 1241 119   Pain   In / Out 0 1   Subjective Functional Status/Changes: \"I've been sleeping on an air mattress so I feel a little stiff today. The air mattress is pretty low so I have difficulty getting up from it.  \"     TREATMENT AREA =  Disorder of muscle, unspecified  Hamstring tightness of both lower extremities    Next PN due 25  Auth due JARRETT    OBJECTIVE    Therapeutic Procedures:  Tx Min Billable or 1:1 Min (if diff from Tx Min) Procedure, Rationale, Specifics   10  53293 Therapeutic Exercise (timed):  increase ROM, strength, coordination, balance, and proprioception to improve patient's ability to progress to PLOF and address remaining functional goals. (see flow sheet as applicable)     Details if applicable:    Per flowsheet    10  71964 Therapeutic Activity (timed):  use of dynamic activities replicating functional movements to increase ROM, strength, coordination, balance, and proprioception in order to improve patient's ability to progress to PLOF and address remaining functional goals.  (see flow sheet as applicable)     Details if applicable:    STS   8  00805 Neuromuscular Re-Education (timed):  improve balance, coordination, kinesthetic sense, posture, core stability and proprioception to improve patient's ability to develop conscious control of individual muscles and awareness of position of extremities in order to progress to PLOF and address remaining functional goals. (see flow sheet as applicable)     Details if applicable:    TA draw/ PPT    11  59051 Self Care/Home Management (timed):  improve patient knowledge and understanding of home injury/symptom/pain management, positioning, and

## 2025-06-19 ENCOUNTER — HOSPITAL ENCOUNTER (OUTPATIENT)
Facility: HOSPITAL | Age: 70
Setting detail: RECURRING SERIES
Discharge: HOME | End: 2025-06-22
Payer: MEDICARE

## 2025-06-19 PROCEDURE — 97110 THERAPEUTIC EXERCISES: CPT

## 2025-06-19 PROCEDURE — 97530 THERAPEUTIC ACTIVITIES: CPT

## 2025-06-19 NOTE — PROGRESS NOTES
PHYSICAL THERAPY - DAILY TREATMENT NOTE (updated )    Patient Name: Sherrell Horowitz    Date: 2025    : 1955  Insurance: Payor: MEDICARE / Plan: MEDICARE PART A AND B / Product Type: *No Product type* /      Patient  verified yes     Visit #   Current / Total 5 16   Time   In / Out 1125 1200   Pain   In / Out 2 1   Subjective Functional Status/Changes: \"I was finally able to sleep in my bed at home so I'm feeling better. I am sore from last visit so that can be the reason for some increased pain. \"     TREATMENT AREA =  Disorder of muscle, unspecified  Hamstring tightness of both lower extremities    Next PN due 25  Auth due JARRETT    OBJECTIVE    Therapeutic Procedures:  Tx Min Billable or 1:1 Min (if diff from Tx Min) Procedure, Rationale, Specifics   10 10 27795 Therapeutic Exercise (timed):  increase ROM, strength, coordination, balance, and proprioception to improve patient's ability to progress to PLOF and address remaining functional goals. (see flow sheet as applicable)     Details if applicable:    Per flowsheet   15 15 90249 Therapeutic Activity (timed):  use of dynamic activities replicating functional movements to increase ROM, strength, coordination, balance, and proprioception in order to improve patient's ability to progress to PLOF and address remaining functional goals.  (see flow sheet as applicable)     Details if applicable:    Lateral Step Ups    10 0 19368 Self Care/Home Management (timed):  improve patient knowledge and understanding of home injury/symptom/pain management, positioning, posture/ergonomics, home safety, activity modification, transfer techniques, and joint protection strategies  to improve patient's ability to progress to PLOF and address remaining functional goals.  (see flow sheet as applicable)     Details if applicable:     35 25 [facility] Totals Reminder: bill using total billable min of TIMED therapeutic procedures (example: do not include dry needle or

## 2025-06-23 ENCOUNTER — HOSPITAL ENCOUNTER (OUTPATIENT)
Facility: HOSPITAL | Age: 70
Setting detail: RECURRING SERIES
Discharge: HOME | End: 2025-06-26
Payer: MEDICARE

## 2025-06-23 PROCEDURE — 97112 NEUROMUSCULAR REEDUCATION: CPT

## 2025-06-23 PROCEDURE — 97530 THERAPEUTIC ACTIVITIES: CPT

## 2025-06-23 PROCEDURE — 97110 THERAPEUTIC EXERCISES: CPT

## 2025-06-23 NOTE — PROGRESS NOTES
PHYSICAL THERAPY - DAILY TREATMENT NOTE (updated )    Patient Name: Sherrell Horowitz    Date: 2025    : 1955  Insurance: Payor: MEDICARE / Plan: MEDICARE PART A AND B / Product Type: *No Product type* /      Patient  verified yes     Visit #   Current / Total 6 16   Time   In / Out 12:42 1:22   Pain   In / Out 1/10 0/10   Subjective Functional Status/Changes: Pt reports no pain right now. She may have had some when she got out of bed but she is ok now.      TREATMENT AREA =  Disorder of muscle, unspecified  Hamstring tightness of both lower extremities    Next PN due 25  Auth due JARRETT    OBJECTIVE    Therapeutic Procedures:  Tx Min Billable or 1:1 Min (if diff from Tx Min) Procedure, Rationale, Specifics   13  30516 Therapeutic Exercise (timed):  increase ROM, strength, coordination, balance, and proprioception to improve patient's ability to progress to PLOF and address remaining functional goals. (see flow sheet as applicable)     Details if applicable:    Bridges x 3, calms     10  97146 Therapeutic Activity (timed):  use of dynamic activities replicating functional movements to increase ROM, strength, coordination, balance, and proprioception in order to improve patient's ability to progress to PLOF and address remaining functional goals.  (see flow sheet as applicable)     Details if applicable:    Stretches, Bike     17  54267 Neuromuscular Re-Education (timed):  improve balance, coordination, kinesthetic sense, posture, core stability and proprioception to improve patient's ability to develop conscious control of individual muscles and awareness of position of extremities in order to progress to PLOF and address remaining functional goals. (see flow sheet as applicable)    Details if applicable:  core re-ed: TA with march, deadbu, SB #0-1, TA with ball/band     40  [facility] BC Totals Reminder: bill using total billable min of TIMED therapeutic procedures (example: do not include dry needle

## 2025-06-25 ENCOUNTER — HOSPITAL ENCOUNTER (OUTPATIENT)
Facility: HOSPITAL | Age: 70
Setting detail: RECURRING SERIES
Discharge: HOME | End: 2025-06-28
Payer: MEDICARE

## 2025-06-25 PROCEDURE — 97110 THERAPEUTIC EXERCISES: CPT

## 2025-06-25 PROCEDURE — 97530 THERAPEUTIC ACTIVITIES: CPT

## 2025-06-25 PROCEDURE — 97112 NEUROMUSCULAR REEDUCATION: CPT

## 2025-06-25 NOTE — PROGRESS NOTES
using total billable min of TIMED therapeutic procedures (example: do not include dry needle or estim unattended, both untimed codes, in totals to left)  8-22 min = 1 unit; 23-37 min = 2 units; 38-52 min = 3 units; 53-67 min = 4 units; 68-82 min = 5 units   Total Total     Charge Capture    [x]  Patient Education billed concurrently with other procedures   [x] Review HEP    [x] Progressed/Changed HEP  [] Other:    Objective Information/Functional Measures/Assessment  - progressed to BTB as noted on FS  - cont to require max demo and cuing for all familiar therex    Pt is making slow progress in therapy and required max demo and cuing with all familiar therex despite reporting she is compliant with HEP. Fair return demo. Overall pain level remain low and pt continues to report decreased apin at end fo sessions. Some hip soreness at end of session today but she declined modalities. PN due NV.    Patient will continue to benefit from skilled PT services to modify and progress therapeutic interventions, analyze and address functional mobility deficits, analyze and address ROM deficits, analyze and address strength deficits, analyze and address soft tissue restrictions, analyze and cue for proper movement patterns, and analyze and modify for postural abnormalities to address functional deficits and attain remaining goals.    Progress toward goals / Updated goals:  []  See Progress Note/Recertification    Short Term Goals: To be accomplished in 4-6 weeks  Goal: Patient will be compliant with home exercise program to decrease pain and improve function.  Status at evaluation: HEP issued   Current status: reports compliance (06/11/25) MET  Progressing: Review of HEP and update Current HEP 06/19/25    2.   Goal: Patient will demonstrate 90/90 hamstring test to < 20 deg B to indicate improved B LE flexibility needed to decrease pain with activity.   Status at evaluation: 30 deg B  Current status: Cont half moon stretch

## 2025-06-30 ENCOUNTER — APPOINTMENT (OUTPATIENT)
Facility: HOSPITAL | Age: 70
End: 2025-06-30
Payer: MEDICARE

## 2025-06-30 ENCOUNTER — HOSPITAL ENCOUNTER (OUTPATIENT)
Facility: HOSPITAL | Age: 70
Setting detail: RECURRING SERIES
Discharge: HOME | End: 2025-07-03
Payer: MEDICARE

## 2025-06-30 PROCEDURE — 97110 THERAPEUTIC EXERCISES: CPT

## 2025-06-30 PROCEDURE — 97140 MANUAL THERAPY 1/> REGIONS: CPT

## 2025-06-30 PROCEDURE — 97530 THERAPEUTIC ACTIVITIES: CPT

## 2025-06-30 NOTE — PROGRESS NOTES
PHYSICAL THERAPY - DAILY TREATMENT NOTE (updated )    Patient Name: Sherrell Horowitz    Date: 2025    : 1955  Insurance: Payor: MEDICARE / Plan: MEDICARE PART A AND B / Product Type: *No Product type* /      Patient  verified yes     Visit #   Current / Total 8 16   Time   In / Out 323 405   Pain   In / Out 1 0   Subjective Functional Status/Changes: \"My right hamstring is bothering me more today. I usually sleep on my left and when I wake up in the morning I have that same discomfort on the left. I fell while in my boat landing on my left side. But I don't feel bad or anything about it right now.  \"     TREATMENT AREA =  Disorder of muscle, unspecified  Hamstring tightness of both lower extremities    Next PN due 25  Auth due JARRETT    OBJECTIVE    Therapeutic Procedures:  Tx Min Billable or 1:1 Min (if diff from Tx Min) Procedure, Rationale, Specifics   15 15 54651 Therapeutic Exercise (timed):  increase ROM, strength, coordination, balance, and proprioception to improve patient's ability to progress to PLOF and address remaining functional goals. (see flow sheet as applicable)     Details if applicable:       15 15 45286 Therapeutic Activity (timed):  use of dynamic activities replicating functional movements to increase ROM, strength, coordination, balance, and proprioception in order to improve patient's ability to progress to PLOF and address remaining functional goals.  (see flow sheet as applicable)     Details if applicable:      20101 Manual Therapy (timed):  decrease pain, increase ROM, and increase tissue extensibility to improve patient's ability to progress to PLOF and address remaining functional goals.  The manual therapy interventions were performed at a separate and distinct time from the therapeutic activities interventions . (see flow sheet as applicable)     Details if applicable: contract  relax to B hamstrings , improving hamstring flexibility.    42 38 [facility] Totals

## 2025-07-02 ENCOUNTER — HOSPITAL ENCOUNTER (OUTPATIENT)
Facility: HOSPITAL | Age: 70
Setting detail: RECURRING SERIES
Discharge: HOME | End: 2025-07-05
Payer: MEDICARE

## 2025-07-02 PROCEDURE — 97110 THERAPEUTIC EXERCISES: CPT

## 2025-07-02 PROCEDURE — 97535 SELF CARE MNGMENT TRAINING: CPT

## 2025-07-02 PROCEDURE — 97530 THERAPEUTIC ACTIVITIES: CPT

## 2025-07-02 NOTE — PROGRESS NOTES
PHYSICAL / OCCUPATIONAL THERAPY - DAILY TREATMENT NOTE    Patient Name: Sherrell Horowitz    Date: 2025    : 1955  Insurance: Payor: MEDICARE / Plan: MEDICARE PART A AND B / Product Type: *No Product type* /      Patient  verified Yes     Visit #   Current / Total 9 16   Time   In / Out 12:08 1:02   Pain   In / Out 1/10 0/10   Subjective Functional Status/Changes: Pt reports about 80% improvement. Worst pain is still 7-8/10 but mostly in early morning when she first wakes up. She hopes to be able to stand longer without pain (20-25 min).     TREATMENT AREA =  Disorder of muscle, unspecified  Hamstring tightness of both lower extremities    OBJECTIVE     Therapeutic Procedures:  Tx Min Billable or 1:1 Min (if diff from Tx Min) Procedure, Rationale, Specifics   24  91650 Therapeutic Exercise (timed):  increase ROM, strength, coordination, balance, and proprioception to improve patient's ability to progress to PLOF and address remaining functional goals. (see flow sheet as applicable)      Details if applicable:        10  68073 Therapeutic Activity (timed):  use of dynamic activities replicating functional movements to increase ROM, strength, coordination, balance, and proprioception in order to improve patient's ability to progress to PLOF and address remaining functional goals.  (see flow sheet as applicable)      Details if applicable: goals assessment    10  00257 Self Care/Home Management (timed):  improve patient knowledge and understanding of home injury/symptom/pain management, positioning, posture/ergonomics, home safety, activity modification, transfer techniques, and joint protection strategies  to improve patient's ability to progress to PLOF and address remaining functional goals.  (see flow sheet as applicable)     Details if applicable:  pain relief, sitting posture; POC     54  [facility] BC Totals Reminder: bill using total billable min of TIMED therapeutic procedures (example: do not include dry 
20 deg B to indicate improved B LE flexibility needed to decrease pain with activity.   Status at evaluation: 30 deg B  Current status: Cont half moon stretch (06/11/25) Progressing; Left: 20; deg Right : 25 deg (6/23/25)  Progressing 06/30/25; met with Left, 20 degrees; progressing well with Right; 23 degrees 7-22-25     3.   Goal: Patient will demonstrate abdominal strength of at least 4/5 to improve core stability needed to allow decreased pain with functional activities.   Status at evaluation: 3/5  Current status: improved ability to perform TA contraction (6/11/25) Progressing  Progressing 06/18/25; mod challenged with hollow tuck 7-22-25     Long Term Goals: To be accomplished in 6-12 weeks  Goal: Patient will be independent with home exercise program to maintain gains of therapy.  Status at evaluation: HEP issued     2.   Goal: Patient will score < 29% on Oswestry Disability Index for Low Back Pain to indicate improved tolerance with functional activities.  Status at evaluation: 42%     3.   Goal: Patient will score at least 68/80 on the Lower Extremity Functional Scale to indicate improved tolerance with functional activities.   Status at evaluation: 59/80       Remaining Goals Unmet:  Short Term Goals: To be accomplished in 4-6 weeks  Goal: Patient will be compliant with home exercise program to decrease pain and improve function.  Status at PN: Review of HEP and update Current HEP 06/19/25     2.   Goal: Patient will demonstrate 90/90 hamstring test to < 20 deg B to indicate improved B LE flexibility needed to decrease pain with activity.   Status at PN: met with Left, 20 degrees; progressing well with Right; 23 degrees 7-22-25     3.   Goal: Patient will demonstrate abdominal strength of at least 4/5 to improve core stability needed to allow decreased pain with functional activities.   Status at PN: 3/5; mod challenged with hollow tuck 7-22-25     Long Term Goals: To be accomplished in 6-12 weeks  Goal:

## 2025-07-07 ENCOUNTER — HOSPITAL ENCOUNTER (OUTPATIENT)
Facility: HOSPITAL | Age: 70
Setting detail: RECURRING SERIES
Discharge: HOME | End: 2025-07-10
Payer: MEDICARE

## 2025-07-07 PROCEDURE — 97112 NEUROMUSCULAR REEDUCATION: CPT

## 2025-07-07 PROCEDURE — 97110 THERAPEUTIC EXERCISES: CPT

## 2025-07-07 NOTE — PROGRESS NOTES
PHYSICAL / OCCUPATIONAL THERAPY - DAILY TREATMENT NOTE    Patient Name: Sherrell Horowitz    Date: 2025    : 1955  Insurance: Payor: MEDICARE / Plan: MEDICARE PART A AND B / Product Type: *No Product type* /      Patient  verified Yes     Visit #   Current / Total 10 16   Time   In / Out 10:04 10:50   Pain   In / Out 1/10 1/10   Subjective Functional Status/Changes: Pt reports doing ok today. Some soreness at Right glute/seat bone.     TREATMENT AREA =  Disorder of muscle, unspecified  Hamstring tightness of both lower extremities    OBJECTIVE      Therapeutic Procedures:  Tx Min Billable or 1:1 Min (if diff from Tx Min) Procedure, Rationale, Specifics   31  25 [id number] Therapeutic Exercise (timed):  increase ROM, strength, coordination, balance, and proprioception to improve patient's ability to progress to PLOF and address remaining functional goals. (see flow sheet as applicable)      Details if applicable:        15  15 [id number] Neuromuscular Re-Education (timed):  improve balance, coordination, kinesthetic sense, posture, core stability and proprioception to improve patient's ability to develop conscious control of individual muscles and awareness of position of extremities in order to progress to PLOF and address remaining functional goals. (see flow sheet as applicable)     Details if applicable:          46 40  MC BC Totals Reminder: bill using total billable min of TIMED therapeutic procedures (example: do not include dry needle or estim unattended, both untimed codes, in totals to left)  8-22 min = 1 unit; 23-37 min = 2 units; 38-52 min = 3 units; 53-67 min = 4 units; 68-82 min = 5 units   Total Total        Charge Capture    [x]  Patient Education billed concurrently with other procedures   [x] Review HEP    [] Progressed/Changed HEP, detail:    [] Other detail:       Objective Information/Functional Measures/Assessment   Additional exercises: standing RDL x15 B; HSC with 2# x15 B; TA in Quadruped

## 2025-07-16 ENCOUNTER — HOSPITAL ENCOUNTER (OUTPATIENT)
Facility: HOSPITAL | Age: 70
Setting detail: RECURRING SERIES
Discharge: HOME | End: 2025-07-19
Payer: MEDICARE

## 2025-07-16 PROCEDURE — 97112 NEUROMUSCULAR REEDUCATION: CPT

## 2025-07-16 PROCEDURE — 97110 THERAPEUTIC EXERCISES: CPT

## 2025-07-18 ENCOUNTER — HOSPITAL ENCOUNTER (OUTPATIENT)
Facility: HOSPITAL | Age: 70
Setting detail: RECURRING SERIES
Discharge: HOME | End: 2025-07-21
Payer: MEDICARE

## 2025-07-18 PROCEDURE — 97110 THERAPEUTIC EXERCISES: CPT

## 2025-07-18 PROCEDURE — 97112 NEUROMUSCULAR REEDUCATION: CPT

## 2025-07-18 PROCEDURE — 97530 THERAPEUTIC ACTIVITIES: CPT

## 2025-07-18 NOTE — PROGRESS NOTES
unattended, both untimed codes, in totals to left)      Charge Capture    [x]  Patient Education billed concurrently with other procedures   [x] Review HEP    [] Progressed/Changed HEP, detail:    [] Other detail:       Objective Information/Functional Measures/Assessment  - progressed to mini squats to TRX squats  - fatigued with seated SLRs  - cuing to prevent posterior hip roll with sidelying hip abd and clamshells  - added quadruped fire hydrant  - mild lumbar rotation noted with bird dogs despite cuing    Pt is progressing slowly towards her goals. LBP has been minimal during sessions with average of 1-2/10, although reports higher pain levels of 7-8/10 at times. HS flexibility has improved since SOC but is about the same as last progress note, added HS S x 3 to address. Challenged with core strengthening in quadruped. Will continue to progress as tolerated.     Patient will continue to benefit from skilled PT services to modify and progress therapeutic interventions, analyze and address functional mobility deficits, analyze and address ROM deficits, analyze and address strength deficits, and analyze and address soft tissue restrictions to address functional deficits and attain remaining goals.     Progress toward goals / Updated goals:  []  See Progress Note/Recertification  Remaining Goals Unmet:  Short Term Goals: To be accomplished in 4-6 weeks  Goal: Patient will be compliant with home exercise program to decrease pain and improve function.  Status at PN: Review of HEP and update Current HEP 06/19/25  Current: 7/15: Pt notes poor recent compliance as she was on a camping vacation.    2.   Goal: Patient will demonstrate 90/90 hamstring test to < 20 deg B to indicate improved B LE flexibility needed to decrease pain with activity.   Status at PN: met with Left, 20 degrees; progressing well with Right; 23 degrees 7-2-25  Current: Left: 22 deg, Right: 20 deg (7/18/25)     3.   Goal: Patient will demonstrate

## 2025-07-21 ENCOUNTER — HOSPITAL ENCOUNTER (OUTPATIENT)
Facility: HOSPITAL | Age: 70
Setting detail: RECURRING SERIES
Discharge: HOME | End: 2025-07-24
Payer: MEDICARE

## 2025-07-21 PROCEDURE — 97110 THERAPEUTIC EXERCISES: CPT

## 2025-07-21 PROCEDURE — 97530 THERAPEUTIC ACTIVITIES: CPT

## 2025-07-21 PROCEDURE — 97112 NEUROMUSCULAR REEDUCATION: CPT

## 2025-07-21 NOTE — PROGRESS NOTES
PHYSICAL / OCCUPATIONAL THERAPY - DAILY TREATMENT NOTE    Patient Name: Sherrell Horowitz    Date: 2025    : 1955  Insurance: Payor: MEDICARE / Plan: MEDICARE PART A AND B / Product Type: *No Product type* /      Patient  verified Yes     Visit #   Current / Total 13 16   Time   In / Out 1:22 2:00   Pain   In / Out 0/10 0/10   Subjective Functional Status/Changes: Pt reports her hamstrings still bother her when she gets up first thing in the morning.      TREATMENT AREA =  Disorder of muscle, unspecified  Hamstring tightness of both lower extremities    OBJECTIVE    Therapeutic Procedures:  Tx Min Billable or 1:1 Min (if diff from Tx Min) Procedure, Rationale, Specifics   15   53634 Therapeutic Exercise (timed):  increase ROM, strength, coordination, balance, and proprioception to improve patient's ability to progress to PLOF and address remaining functional goals. (see flow sheet as applicable)      Details if applicable:  Bike, bridge x 3 with TB, clams with TB, SL Hip Abd             15  15651 Therapeutic Activity (timed):  use of dynamic activities replicating functional movements to increase ROM, strength, coordination, balance, and proprioception in order to improve patient's ability to progress to PLOF and address remaining functional goals.  (see flow sheet as applicable)    Details if applicable:  TRX squats, open books, SB rollouts, RDL, STS, HEP           8  94344 Neuromuscular Re-Education (timed):  improve balance, coordination, kinesthetic sense, posture, core stability and proprioception to improve patient's ability to develop conscious control of individual muscles and awareness of position of extremities in order to progress to PLOF and address remaining functional goals. (see flow sheet as applicable)    Details if applicable:  quadruped activities with TA     Total  38 Total Reminder: MC/BC bill using total billable min of TIMED therapeutic procedures (example: do not include dry

## 2025-07-23 ENCOUNTER — HOSPITAL ENCOUNTER (OUTPATIENT)
Facility: HOSPITAL | Age: 70
Setting detail: RECURRING SERIES
Discharge: HOME | End: 2025-07-26
Payer: MEDICARE

## 2025-07-23 PROCEDURE — 97112 NEUROMUSCULAR REEDUCATION: CPT

## 2025-07-23 PROCEDURE — 97110 THERAPEUTIC EXERCISES: CPT

## 2025-07-23 PROCEDURE — 97530 THERAPEUTIC ACTIVITIES: CPT

## 2025-07-23 NOTE — PROGRESS NOTES
PHYSICAL / OCCUPATIONAL THERAPY - DAILY TREATMENT NOTE    Patient Name: Sherrell Horowitz    Date: 2025    : 1955  Insurance: Payor: MEDICARE / Plan: MEDICARE PART A AND B / Product Type: *No Product type* /      Patient  verified Yes     Visit #   Current / Total 14 16   Time   In / Out 12:40 1:18   Pain   In / Out 0/10 0/10   Subjective Functional Status/Changes: Pt reports no pain just a 1/10 in her foot from her plantar fasciitis.      TREATMENT AREA =  Disorder of muscle, unspecified  Hamstring tightness of both lower extremities    OBJECTIVE    Therapeutic Procedures:  Tx Min Billable or 1:1 Min (if diff from Tx Min) Procedure, Rationale, Specifics   15   04184 Therapeutic Exercise (timed):  increase ROM, strength, coordination, balance, and proprioception to improve patient's ability to progress to PLOF and address remaining functional goals. (see flow sheet as applicable)      Details if applicable:  Bike, bridge x 3 with TB, clams with TB, SL Hip Abd             10  48958 Therapeutic Activity (timed):  use of dynamic activities replicating functional movements to increase ROM, strength, coordination, balance, and proprioception in order to improve patient's ability to progress to PLOF and address remaining functional goals.  (see flow sheet as applicable)    Details if applicable:  TRX squats, open books           13  67615 Neuromuscular Re-Education (timed):  improve balance, coordination, kinesthetic sense, posture, core stability and proprioception to improve patient's ability to develop conscious control of individual muscles and awareness of position of extremities in order to progress to PLOF and address remaining functional goals. (see flow sheet as applicable)    Details if applicable:  quadruped activities with TA, pallof press     Total  38 Total Reminder: MC/BC bill using total billable min of TIMED therapeutic procedures (example: do not include dry needle or estim unattended, both

## 2025-07-28 ENCOUNTER — HOSPITAL ENCOUNTER (OUTPATIENT)
Facility: HOSPITAL | Age: 70
Setting detail: RECURRING SERIES
Discharge: HOME | End: 2025-07-31
Payer: MEDICARE

## 2025-07-28 PROCEDURE — 97530 THERAPEUTIC ACTIVITIES: CPT

## 2025-07-28 PROCEDURE — 97110 THERAPEUTIC EXERCISES: CPT

## 2025-07-28 PROCEDURE — 97112 NEUROMUSCULAR REEDUCATION: CPT

## 2025-07-28 NOTE — PROGRESS NOTES
PHYSICAL / OCCUPATIONAL THERAPY - DAILY TREATMENT NOTE    Patient Name: Sherrell Horowitz    Date: 2025    : 1955  Insurance: Payor: MEDICARE / Plan: MEDICARE PART A AND B / Product Type: *No Product type* /      Patient  verified Yes     Visit #   Current / Total 15 16   Time   In / Out 1:22 2:00   Pain   In / Out 1/10 0/10   Subjective Functional Status/Changes: Pt reports pain at the hamstring origin near her butt. She feels much better than when she started and she thinks she wants to continue therapy just a little longer.      TREATMENT AREA =  Disorder of muscle, unspecified  Hamstring tightness of both lower extremities    OBJECTIVE    Therapeutic Procedures:  Tx Min Billable or 1:1 Min (if diff from Tx Min) Procedure, Rationale, Specifics   15   03075 Therapeutic Exercise (timed):  increase ROM, strength, coordination, balance, and proprioception to improve patient's ability to progress to PLOF and address remaining functional goals. (see flow sheet as applicable)      Details if applicable:  Bike, bridge x 3 with TB, clams with TB             13  13168 Therapeutic Activity (timed):  use of dynamic activities replicating functional movements to increase ROM, strength, coordination, balance, and proprioception in order to improve patient's ability to progress to PLOF and address remaining functional goals.  (see flow sheet as applicable)    Details if applicable:  TRX squats, step ups, HS S, SB rollouts           10  43773 Neuromuscular Re-Education (timed):  improve balance, coordination, kinesthetic sense, posture, core stability and proprioception to improve patient's ability to develop conscious control of individual muscles and awareness of position of extremities in order to progress to PLOF and address remaining functional goals. (see flow sheet as applicable)    Details if applicable:  quadruped activities with TA     Total  38 Total Reminder: MC/BC bill using total billable min of TIMED

## 2025-07-30 ENCOUNTER — APPOINTMENT (OUTPATIENT)
Facility: HOSPITAL | Age: 70
End: 2025-07-30
Payer: MEDICARE

## 2025-07-31 ENCOUNTER — HOSPITAL ENCOUNTER (OUTPATIENT)
Facility: HOSPITAL | Age: 70
Setting detail: RECURRING SERIES
End: 2025-07-31
Payer: MEDICARE

## 2025-07-31 ENCOUNTER — APPOINTMENT (OUTPATIENT)
Facility: HOSPITAL | Age: 70
End: 2025-07-31
Payer: MEDICARE

## 2025-07-31 PROCEDURE — 97530 THERAPEUTIC ACTIVITIES: CPT

## 2025-07-31 PROCEDURE — 97110 THERAPEUTIC EXERCISES: CPT

## 2025-07-31 PROCEDURE — 97112 NEUROMUSCULAR REEDUCATION: CPT

## 2025-07-31 NOTE — PROGRESS NOTES
[facility]  [address] [place] [address] Ph:[phone] Fx: [phone]  PROGRESS NOTE  Patient Name: Sherrell Horowitz : 1955   Treatment/Medical Diagnosis: Disorder of muscle, unspecified  Hamstring tightness of both lower extremities   Referral Source: Batool Hale DO     Date of Initial Visit: 25 Attended Visits: 15 Missed Visits: 0     SUMMARY OF TREATMENT  Patient's POC has consisted of therex, therapeutic activities, manual therapy prn, modalities prn, pt. education, and a comprehensive HEP. Treatment strategies used to address functional mobility deficits, ROM deficits, strength deficits, analyze and address soft tissue restrictions, analyze and cue movement patterns, analyze and modify body mechanics/ergonomics, assess and modify postural abnormalities and instruct in home and community integration.     CURRENT STATUS  Pt has attended 15 visits, verbalizing 80% overall improvements with physical therapy. Pt has met 3/3 STG and 2/3 LTG since start of therapy. Stating improvements with tolerating ADL's, household chores, and care giving for her mother. Pt demonstrated improved hamstring flexibility and decreased frequency of pain. Patient continues to display difficultly symptom management, prolonged sitting, and pain first thing in the morning. POC discussed with patient to allow for adjustment of goals and for continued HEP adherence to allow for greater independency.     GOALS/MEASURE OF PROGRESS Goal Met?   Short Term Goals: To be accomplished in 4-6 weeks  Goal: Patient will be compliant with home exercise program to decrease pain and improve function.  Status at PN: Review of HEP and update Current HEP 25  Current: 7/15: Pt notes poor recent compliance as she was on a camping vacation.; Progressing: reports compliance (25)     2.   Goal: Patient will demonstrate 90/90 hamstring test to < 20 deg

## 2025-07-31 NOTE — PROGRESS NOTES
PHYSICAL THERAPY - DAILY TREATMENT NOTE (updated )    Patient Name: Sherrell Horowitz    Date: 2025    : 1955  Insurance: Payor: MEDICARE / Plan: MEDICARE PART A AND B / Product Type: *No Product type* /      Patient  verified yes     Visit #   Current / Total 16 16   Time   In / Out 240 320   Pain   In / Out 0 0   Subjective Functional Status/Changes: \"Am I always going to have this hamstring pain \"     TREATMENT AREA =  Disorder of muscle, unspecified  Hamstring tightness of both lower extremities    Next PN due 25  RC due 25  Auth due JARRETT    OBJECTIVE    Therapeutic Procedures:  Tx Min Billable or 1:1 Min (if diff from Tx Min) Procedure, Rationale, Specifics   15 15 59456 Therapeutic Exercise (timed):  increase ROM, strength, coordination, balance, and proprioception to improve patient's ability to progress to PLOF and address remaining functional goals. (see flow sheet as applicable)     Details if applicable:    Per flowsheet   15 36 82525 Therapeutic Activity (timed):  use of dynamic activities replicating functional movements to increase ROM, strength, coordination, balance, and proprioception in order to improve patient's ability to progress to PLOF and address remaining functional goals.  (see flow sheet as applicable)     Details if applicable:     10 10 21667 Neuromuscular Re-Education (timed):  improve balance, coordination, kinesthetic sense, posture, core stability and proprioception to improve patient's ability to develop conscious control of individual muscles and awareness of position of extremities in order to progress to PLOF and address remaining functional goals. (see flow sheet as applicable)     Details if applicable:     40 40 MC BC Totals Reminder: bill using total billable min of TIMED therapeutic procedures (example: do not include dry needle or estim unattended, both untimed codes, in totals to left)  8-22 min = 1 unit; 23-37 min = 2 units; 38-52 min = 3 units;

## 2025-08-04 ENCOUNTER — HOSPITAL ENCOUNTER (OUTPATIENT)
Facility: HOSPITAL | Age: 70
Discharge: HOME | End: 2025-08-07
Payer: MEDICARE

## 2025-08-04 VITALS — HEIGHT: 60 IN | WEIGHT: 132 LBS | BODY MASS INDEX: 25.91 KG/M2

## 2025-08-04 DIAGNOSIS — R22.31 LOCALIZED SWELLING, MASS, OR LUMP OF UPPER EXTREMITY, RIGHT: ICD-10-CM

## 2025-08-04 DIAGNOSIS — N63.31 UNSPECIFIED LUMP IN AXILLARY TAIL OF THE RIGHT BREAST: ICD-10-CM

## 2025-08-04 PROCEDURE — 76642 ULTRASOUND BREAST LIMITED: CPT

## 2025-08-04 PROCEDURE — 77065 DX MAMMO INCL CAD UNI: CPT

## 2025-08-06 ENCOUNTER — HOSPITAL ENCOUNTER (OUTPATIENT)
Facility: HOSPITAL | Age: 70
Setting detail: RECURRING SERIES
Discharge: HOME | End: 2025-08-09
Payer: MEDICARE

## 2025-08-06 PROCEDURE — 97110 THERAPEUTIC EXERCISES: CPT

## 2025-08-06 PROCEDURE — 97112 NEUROMUSCULAR REEDUCATION: CPT

## 2025-08-06 PROCEDURE — 97535 SELF CARE MNGMENT TRAINING: CPT

## 2025-08-12 ENCOUNTER — HOSPITAL ENCOUNTER (OUTPATIENT)
Facility: HOSPITAL | Age: 70
Setting detail: RECURRING SERIES
Discharge: HOME | End: 2025-08-15
Payer: MEDICARE

## 2025-08-12 PROCEDURE — 97530 THERAPEUTIC ACTIVITIES: CPT

## 2025-08-12 PROCEDURE — 97110 THERAPEUTIC EXERCISES: CPT

## 2025-08-29 ENCOUNTER — HOSPITAL ENCOUNTER (OUTPATIENT)
Facility: HOSPITAL | Age: 70
Setting detail: RECURRING SERIES
Discharge: HOME | End: 2025-09-01
Payer: MEDICARE

## 2025-08-29 PROCEDURE — 97535 SELF CARE MNGMENT TRAINING: CPT

## 2025-08-29 PROCEDURE — 97530 THERAPEUTIC ACTIVITIES: CPT

## 2025-08-29 PROCEDURE — 97112 NEUROMUSCULAR REEDUCATION: CPT

## 2025-08-29 PROCEDURE — 97110 THERAPEUTIC EXERCISES: CPT
